# Patient Record
Sex: MALE | Race: WHITE | NOT HISPANIC OR LATINO | Employment: OTHER | URBAN - METROPOLITAN AREA
[De-identification: names, ages, dates, MRNs, and addresses within clinical notes are randomized per-mention and may not be internally consistent; named-entity substitution may affect disease eponyms.]

---

## 2017-01-06 ENCOUNTER — HOSPITAL ENCOUNTER (OUTPATIENT)
Dept: NON INVASIVE DIAGNOSTICS | Facility: CLINIC | Age: 82
Discharge: HOME | End: 2017-01-06
Payer: MEDICARE

## 2017-01-06 DIAGNOSIS — I50.20 SYSTOLIC HEART FAILURE, UNSPECIFIED HEART FAILURE CHRONICITY: ICD-10-CM

## 2017-01-06 PROCEDURE — 93306 TTE W/DOPPLER COMPLETE: CPT

## 2017-01-06 PROCEDURE — 93225 XTRNL ECG REC<48 HRS REC: CPT

## 2017-01-06 PROCEDURE — 93226 XTRNL ECG REC<48 HR SCAN A/R: CPT

## 2017-03-13 ENCOUNTER — TRANSCRIBE ORDERS (OUTPATIENT)
Dept: LAB | Facility: HOSPITAL | Age: 82
End: 2017-03-13

## 2017-03-13 ENCOUNTER — LAB (OUTPATIENT)
Dept: LAB | Facility: HOSPITAL | Age: 82
End: 2017-03-13
Attending: INTERNAL MEDICINE
Payer: MEDICARE

## 2017-03-13 DIAGNOSIS — E55.9 UNSPECIFIED VITAMIN D DEFICIENCY: ICD-10-CM

## 2017-03-13 DIAGNOSIS — E03.9 UNSPECIFIED HYPOTHYROIDISM: ICD-10-CM

## 2017-03-13 DIAGNOSIS — E78.5 HYPERLIPIDEMIA, UNSPECIFIED HYPERLIPIDEMIA TYPE: Primary | ICD-10-CM

## 2017-03-13 DIAGNOSIS — Z00.00 ROUTINE GENERAL MEDICAL EXAMINATION AT A HEALTH CARE FACILITY: ICD-10-CM

## 2017-03-13 DIAGNOSIS — I50.20 SYSTOLIC HEART FAILURE, UNSPECIFIED HEART FAILURE CHRONICITY: ICD-10-CM

## 2017-03-13 DIAGNOSIS — I50.20 SYSTOLIC HEART FAILURE, UNSPECIFIED HEART FAILURE CHRONICITY: Primary | ICD-10-CM

## 2017-03-13 LAB
ALBUMIN SERPL BCP-MCNC: 3.5 G/DL (ref 3.5–5)
ALP SERPL-CCNC: 66 U/L (ref 46–116)
ALT SERPL W P-5'-P-CCNC: 13 U/L (ref 12–78)
ANION GAP SERPL CALCULATED.3IONS-SCNC: 7 MMOL/L (ref 4–13)
AST SERPL W P-5'-P-CCNC: 14 U/L (ref 5–45)
BACTERIA UR QL AUTO: ABNORMAL /HPF
BASOPHILS # BLD AUTO: 0.05 THOUSANDS/ΜL (ref 0–0.1)
BASOPHILS NFR BLD AUTO: 1 % (ref 0–1)
BILIRUB SERPL-MCNC: 0.66 MG/DL (ref 0.2–1)
BILIRUB UR QL STRIP: NEGATIVE
BUN SERPL-MCNC: 26 MG/DL (ref 5–25)
CALCIUM SERPL-MCNC: 8.6 MG/DL (ref 8.3–10.1)
CHLORIDE SERPL-SCNC: 104 MMOL/L (ref 100–108)
CHOLEST SERPL-MCNC: 180 MG/DL (ref 50–200)
CLARITY UR: CLEAR
CO2 SERPL-SCNC: 30 MMOL/L (ref 21–32)
COLOR UR: YELLOW
CREAT SERPL-MCNC: 1.98 MG/DL (ref 0.6–1.3)
EOSINOPHIL # BLD AUTO: 0.24 THOUSAND/ΜL (ref 0–0.61)
EOSINOPHIL NFR BLD AUTO: 4 % (ref 0–6)
ERYTHROCYTE [DISTWIDTH] IN BLOOD BY AUTOMATED COUNT: 14.5 % (ref 11.6–15.1)
GFR SERPL CREATININE-BSD FRML MDRD: 32.3 ML/MIN/1.73SQ M
GLUCOSE SERPL-MCNC: 85 MG/DL (ref 65–140)
GLUCOSE UR STRIP-MCNC: NEGATIVE MG/DL
HCT VFR BLD AUTO: 38.1 % (ref 36.5–49.3)
HDLC SERPL-MCNC: 44 MG/DL (ref 40–60)
HGB BLD-MCNC: 12.3 G/DL (ref 12–17)
HGB UR QL STRIP.AUTO: NEGATIVE
HYALINE CASTS #/AREA URNS LPF: ABNORMAL /LPF
KETONES UR STRIP-MCNC: NEGATIVE MG/DL
LDLC SERPL CALC-MCNC: 121 MG/DL (ref 0–100)
LEUKOCYTE ESTERASE UR QL STRIP: ABNORMAL
LYMPHOCYTES # BLD AUTO: 2.27 THOUSANDS/ΜL (ref 0.6–4.47)
LYMPHOCYTES NFR BLD AUTO: 34 % (ref 14–44)
MCH RBC QN AUTO: 30.3 PG (ref 26.8–34.3)
MCHC RBC AUTO-ENTMCNC: 32.3 G/DL (ref 31.4–37.4)
MCV RBC AUTO: 94 FL (ref 82–98)
MONOCYTES # BLD AUTO: 0.6 THOUSAND/ΜL (ref 0.17–1.22)
MONOCYTES NFR BLD AUTO: 9 % (ref 4–12)
NEUTROPHILS # BLD AUTO: 3.49 THOUSANDS/ΜL (ref 1.85–7.62)
NEUTS SEG NFR BLD AUTO: 52 % (ref 43–75)
NITRITE UR QL STRIP: NEGATIVE
NON-SQ EPI CELLS URNS QL MICRO: ABNORMAL /HPF
NRBC BLD AUTO-RTO: 0 /100 WBCS
PH UR STRIP.AUTO: 6.5 [PH] (ref 4.5–8)
PLATELET # BLD AUTO: 201 THOUSANDS/UL (ref 149–390)
PMV BLD AUTO: 11.2 FL (ref 8.9–12.7)
POTASSIUM SERPL-SCNC: 4.4 MMOL/L (ref 3.5–5.3)
PROT SERPL-MCNC: 7.6 G/DL (ref 6.4–8.2)
PROT UR STRIP-MCNC: ABNORMAL MG/DL
RBC # BLD AUTO: 4.06 MILLION/UL (ref 3.88–5.62)
RBC #/AREA URNS AUTO: ABNORMAL /HPF
SODIUM SERPL-SCNC: 141 MMOL/L (ref 136–145)
SP GR UR STRIP.AUTO: 1.02 (ref 1–1.03)
TRIGL SERPL-MCNC: 76 MG/DL
TSH SERPL DL<=0.05 MIU/L-ACNC: 1.18 UIU/ML (ref 0.36–3.74)
UROBILINOGEN UR QL STRIP.AUTO: 0.2 E.U./DL
WBC # BLD AUTO: 6.66 THOUSAND/UL (ref 4.31–10.16)
WBC #/AREA URNS AUTO: ABNORMAL /HPF

## 2017-03-13 PROCEDURE — 81001 URINALYSIS AUTO W/SCOPE: CPT | Performed by: INTERNAL MEDICINE

## 2017-03-13 PROCEDURE — 80061 LIPID PANEL: CPT

## 2017-03-13 PROCEDURE — 84443 ASSAY THYROID STIM HORMONE: CPT

## 2017-03-13 PROCEDURE — 80053 COMPREHEN METABOLIC PANEL: CPT

## 2017-03-13 PROCEDURE — 36415 COLL VENOUS BLD VENIPUNCTURE: CPT

## 2017-03-13 PROCEDURE — 85025 COMPLETE CBC W/AUTO DIFF WBC: CPT

## 2017-06-12 ENCOUNTER — TRANSCRIBE ORDERS (OUTPATIENT)
Dept: LAB | Facility: HOSPITAL | Age: 82
End: 2017-06-12

## 2017-06-12 ENCOUNTER — APPOINTMENT (OUTPATIENT)
Dept: LAB | Facility: HOSPITAL | Age: 82
DRG: 872 | End: 2017-06-12
Attending: INTERNAL MEDICINE
Payer: MEDICARE

## 2017-06-12 DIAGNOSIS — I50.20 SYSTOLIC HEART FAILURE, UNSPECIFIED HEART FAILURE CHRONICITY: ICD-10-CM

## 2017-06-12 DIAGNOSIS — R73.01 IMPAIRED FASTING GLUCOSE: ICD-10-CM

## 2017-06-12 DIAGNOSIS — Z00.00 ROUTINE GENERAL MEDICAL EXAMINATION AT A HEALTH CARE FACILITY: ICD-10-CM

## 2017-06-12 DIAGNOSIS — K12.2 CELLULITIS AND ABSCESS OF ORAL SOFT TISSUES: Primary | ICD-10-CM

## 2017-06-12 DIAGNOSIS — E55.9 UNSPECIFIED VITAMIN D DEFICIENCY: ICD-10-CM

## 2017-06-12 DIAGNOSIS — E03.9 UNSPECIFIED HYPOTHYROIDISM: ICD-10-CM

## 2017-06-12 DIAGNOSIS — N18.9 CKD (CHRONIC KIDNEY DISEASE), UNSPECIFIED STAGE: ICD-10-CM

## 2017-06-12 DIAGNOSIS — E78.5 OTHER AND UNSPECIFIED HYPERLIPIDEMIA: Primary | ICD-10-CM

## 2017-06-12 DIAGNOSIS — D51.9 ANEMIA DUE TO VITAMIN B12 DEFICIENCY, UNSPECIFIED B12 DEFICIENCY TYPE: ICD-10-CM

## 2017-06-12 LAB
ALBUMIN SERPL BCP-MCNC: 3.5 G/DL (ref 3.5–5)
ALP SERPL-CCNC: 71 U/L (ref 46–116)
ALT SERPL W P-5'-P-CCNC: 14 U/L (ref 12–78)
ANION GAP SERPL CALCULATED.3IONS-SCNC: 4 MMOL/L (ref 4–13)
AST SERPL W P-5'-P-CCNC: 13 U/L (ref 5–45)
BACTERIA UR QL AUTO: NORMAL /HPF
BASOPHILS # BLD AUTO: 0.04 THOUSANDS/ΜL (ref 0–0.1)
BASOPHILS NFR BLD AUTO: 1 % (ref 0–1)
BILIRUB SERPL-MCNC: 0.6 MG/DL (ref 0.2–1)
BILIRUB UR QL STRIP: NEGATIVE
BUN SERPL-MCNC: 32 MG/DL (ref 5–25)
CALCIUM SERPL-MCNC: 8.7 MG/DL (ref 8.3–10.1)
CHLORIDE SERPL-SCNC: 103 MMOL/L (ref 100–108)
CLARITY UR: CLEAR
CO2 SERPL-SCNC: 31 MMOL/L (ref 21–32)
COLOR UR: YELLOW
CREAT SERPL-MCNC: 1.94 MG/DL (ref 0.6–1.3)
EOSINOPHIL # BLD AUTO: 0.22 THOUSAND/ΜL (ref 0–0.61)
EOSINOPHIL NFR BLD AUTO: 3 % (ref 0–6)
ERYTHROCYTE [DISTWIDTH] IN BLOOD BY AUTOMATED COUNT: 14.3 % (ref 11.6–15.1)
EST. AVERAGE GLUCOSE BLD GHB EST-MCNC: 114 MG/DL
GFR SERPL CREATININE-BSD FRML MDRD: 33 ML/MIN/1.73SQ M
GLUCOSE P FAST SERPL-MCNC: 93 MG/DL (ref 65–99)
GLUCOSE UR STRIP-MCNC: NEGATIVE MG/DL
HBA1C MFR BLD: 5.6 % (ref 4.2–6.3)
HCT VFR BLD AUTO: 38.2 % (ref 36.5–49.3)
HGB BLD-MCNC: 12.2 G/DL (ref 12–17)
HGB UR QL STRIP.AUTO: NEGATIVE
HYALINE CASTS #/AREA URNS LPF: NORMAL /LPF
KETONES UR STRIP-MCNC: NEGATIVE MG/DL
LEUKOCYTE ESTERASE UR QL STRIP: ABNORMAL
LYMPHOCYTES # BLD AUTO: 2.3 THOUSANDS/ΜL (ref 0.6–4.47)
LYMPHOCYTES NFR BLD AUTO: 31 % (ref 14–44)
MCH RBC QN AUTO: 30.1 PG (ref 26.8–34.3)
MCHC RBC AUTO-ENTMCNC: 31.9 G/DL (ref 31.4–37.4)
MCV RBC AUTO: 94 FL (ref 82–98)
MONOCYTES # BLD AUTO: 0.84 THOUSAND/ΜL (ref 0.17–1.22)
MONOCYTES NFR BLD AUTO: 11 % (ref 4–12)
NEUTROPHILS # BLD AUTO: 3.93 THOUSANDS/ΜL (ref 1.85–7.62)
NEUTS SEG NFR BLD AUTO: 54 % (ref 43–75)
NITRITE UR QL STRIP: NEGATIVE
NON-SQ EPI CELLS URNS QL MICRO: NORMAL /HPF
NRBC BLD AUTO-RTO: 0 /100 WBCS
PH UR STRIP.AUTO: 7 [PH] (ref 4.5–8)
PLATELET # BLD AUTO: 190 THOUSANDS/UL (ref 149–390)
PMV BLD AUTO: 11 FL (ref 8.9–12.7)
POTASSIUM SERPL-SCNC: 4.2 MMOL/L (ref 3.5–5.3)
PROT SERPL-MCNC: 7.7 G/DL (ref 6.4–8.2)
PROT UR STRIP-MCNC: ABNORMAL MG/DL
RBC # BLD AUTO: 4.05 MILLION/UL (ref 3.88–5.62)
RBC #/AREA URNS AUTO: NORMAL /HPF
SODIUM SERPL-SCNC: 138 MMOL/L (ref 136–145)
SP GR UR STRIP.AUTO: 1.02 (ref 1–1.03)
UROBILINOGEN UR QL STRIP.AUTO: 0.2 E.U./DL
WBC # BLD AUTO: 7.34 THOUSAND/UL (ref 4.31–10.16)
WBC #/AREA URNS AUTO: NORMAL /HPF

## 2017-06-12 PROCEDURE — 85025 COMPLETE CBC W/AUTO DIFF WBC: CPT

## 2017-06-12 PROCEDURE — 81001 URINALYSIS AUTO W/SCOPE: CPT | Performed by: INTERNAL MEDICINE

## 2017-06-12 PROCEDURE — 80053 COMPREHEN METABOLIC PANEL: CPT

## 2017-06-12 PROCEDURE — 83036 HEMOGLOBIN GLYCOSYLATED A1C: CPT

## 2017-06-12 PROCEDURE — 36415 COLL VENOUS BLD VENIPUNCTURE: CPT

## 2017-06-13 ENCOUNTER — APPOINTMENT (EMERGENCY)
Dept: RADIOLOGY | Facility: HOSPITAL | Age: 82
DRG: 872 | End: 2017-06-13
Payer: MEDICARE

## 2017-06-13 ENCOUNTER — APPOINTMENT (INPATIENT)
Dept: NON INVASIVE DIAGNOSTICS | Facility: HOSPITAL | Age: 82
DRG: 872 | End: 2017-06-13
Payer: MEDICARE

## 2017-06-13 ENCOUNTER — HOSPITAL ENCOUNTER (INPATIENT)
Facility: HOSPITAL | Age: 82
LOS: 2 days | Discharge: HOME HEALTH | DRG: 872 | End: 2017-06-15
Attending: EMERGENCY MEDICINE | Admitting: INTERNAL MEDICINE
Payer: MEDICARE

## 2017-06-13 DIAGNOSIS — I83.009 VENOUS STASIS ULCER (HCC): ICD-10-CM

## 2017-06-13 DIAGNOSIS — N17.9 AKI (ACUTE KIDNEY INJURY) (HCC): ICD-10-CM

## 2017-06-13 DIAGNOSIS — N18.30 STAGE 3 CHRONIC KIDNEY DISEASE (HCC): ICD-10-CM

## 2017-06-13 DIAGNOSIS — I50.32 CHRONIC DIASTOLIC CONGESTIVE HEART FAILURE (HCC): ICD-10-CM

## 2017-06-13 DIAGNOSIS — L03.115 CELLULITIS OF RIGHT LOWER EXTREMITY: Primary | ICD-10-CM

## 2017-06-13 DIAGNOSIS — A41.9 SEPSIS (HCC): ICD-10-CM

## 2017-06-13 DIAGNOSIS — R60.0 BILATERAL EDEMA OF LOWER EXTREMITY: ICD-10-CM

## 2017-06-13 DIAGNOSIS — L97.909 VENOUS STASIS ULCER (HCC): ICD-10-CM

## 2017-06-13 DIAGNOSIS — R09.02 HYPOXIA: ICD-10-CM

## 2017-06-13 PROBLEM — I10 HYPERTENSION: Status: ACTIVE | Noted: 2017-06-13

## 2017-06-13 PROBLEM — L03.90 CELLULITIS: Status: ACTIVE | Noted: 2017-06-13

## 2017-06-13 PROBLEM — E03.9 HYPOTHYROID: Status: ACTIVE | Noted: 2017-06-13

## 2017-06-13 LAB
ALBUMIN SERPL BCP-MCNC: 3.5 G/DL (ref 3.5–5)
ALP SERPL-CCNC: 68 U/L (ref 46–116)
ALT SERPL W P-5'-P-CCNC: 14 U/L (ref 12–78)
ANION GAP SERPL CALCULATED.3IONS-SCNC: 7 MMOL/L (ref 4–13)
APTT PPP: 25 SECONDS (ref 23–35)
AST SERPL W P-5'-P-CCNC: 14 U/L (ref 5–45)
BACTERIA UR QL AUTO: ABNORMAL /HPF
BASOPHILS # BLD AUTO: 0.02 THOUSANDS/ΜL (ref 0–0.1)
BASOPHILS NFR BLD AUTO: 0 % (ref 0–1)
BILIRUB SERPL-MCNC: 0.65 MG/DL (ref 0.2–1)
BILIRUB UR QL STRIP: NEGATIVE
BUN SERPL-MCNC: 32 MG/DL (ref 5–25)
CALCIUM SERPL-MCNC: 8.3 MG/DL (ref 8.3–10.1)
CHLORIDE SERPL-SCNC: 104 MMOL/L (ref 100–108)
CLARITY UR: CLEAR
CO2 SERPL-SCNC: 29 MMOL/L (ref 21–32)
COLOR UR: YELLOW
CREAT SERPL-MCNC: 1.84 MG/DL (ref 0.6–1.3)
EOSINOPHIL # BLD AUTO: 0.07 THOUSAND/ΜL (ref 0–0.61)
EOSINOPHIL NFR BLD AUTO: 1 % (ref 0–6)
ERYTHROCYTE [DISTWIDTH] IN BLOOD BY AUTOMATED COUNT: 14.3 % (ref 11.6–15.1)
GFR SERPL CREATININE-BSD FRML MDRD: 35.1 ML/MIN/1.73SQ M
GLUCOSE SERPL-MCNC: 114 MG/DL (ref 65–140)
GLUCOSE UR STRIP-MCNC: NEGATIVE MG/DL
HCT VFR BLD AUTO: 36.6 % (ref 36.5–49.3)
HGB BLD-MCNC: 11.9 G/DL (ref 12–17)
HGB UR QL STRIP.AUTO: NEGATIVE
HYALINE CASTS #/AREA URNS LPF: ABNORMAL /LPF
INR PPP: 1.1 (ref 0.86–1.16)
KETONES UR STRIP-MCNC: NEGATIVE MG/DL
LACTATE SERPL-SCNC: 1.3 MMOL/L (ref 0.5–2)
LEUKOCYTE ESTERASE UR QL STRIP: ABNORMAL
LYMPHOCYTES # BLD AUTO: 0.79 THOUSANDS/ΜL (ref 0.6–4.47)
LYMPHOCYTES NFR BLD AUTO: 6 % (ref 14–44)
MCH RBC QN AUTO: 30.6 PG (ref 26.8–34.3)
MCHC RBC AUTO-ENTMCNC: 32.5 G/DL (ref 31.4–37.4)
MCV RBC AUTO: 94 FL (ref 82–98)
MONOCYTES # BLD AUTO: 0.71 THOUSAND/ΜL (ref 0.17–1.22)
MONOCYTES NFR BLD AUTO: 5 % (ref 4–12)
NEUTROPHILS # BLD AUTO: 12.47 THOUSANDS/ΜL (ref 1.85–7.62)
NEUTS SEG NFR BLD AUTO: 88 % (ref 43–75)
NITRITE UR QL STRIP: NEGATIVE
NON-SQ EPI CELLS URNS QL MICRO: ABNORMAL /HPF
NRBC BLD AUTO-RTO: 0 /100 WBCS
PH UR STRIP.AUTO: 6 [PH] (ref 4.5–8)
PLATELET # BLD AUTO: 162 THOUSANDS/UL (ref 149–390)
PMV BLD AUTO: 10.9 FL (ref 8.9–12.7)
POTASSIUM SERPL-SCNC: 3.9 MMOL/L (ref 3.5–5.3)
PROT SERPL-MCNC: 7.5 G/DL (ref 6.4–8.2)
PROT UR STRIP-MCNC: ABNORMAL MG/DL
PROTHROMBIN TIME: 14.2 SECONDS (ref 12.1–14.4)
RBC # BLD AUTO: 3.89 MILLION/UL (ref 3.88–5.62)
RBC #/AREA URNS AUTO: ABNORMAL /HPF
SODIUM SERPL-SCNC: 140 MMOL/L (ref 136–145)
SP GR UR STRIP.AUTO: 1.01 (ref 1–1.03)
SPECIMEN SOURCE: NORMAL
TROPONIN I BLD-MCNC: 0 NG/ML (ref 0–0.08)
TSH SERPL DL<=0.05 MIU/L-ACNC: 0.27 UIU/ML (ref 0.36–3.74)
UROBILINOGEN UR QL STRIP.AUTO: 0.2 E.U./DL
WBC # BLD AUTO: 14.09 THOUSAND/UL (ref 4.31–10.16)
WBC #/AREA URNS AUTO: ABNORMAL /HPF

## 2017-06-13 PROCEDURE — 96361 HYDRATE IV INFUSION ADD-ON: CPT

## 2017-06-13 PROCEDURE — 85730 THROMBOPLASTIN TIME PARTIAL: CPT | Performed by: EMERGENCY MEDICINE

## 2017-06-13 PROCEDURE — 87040 BLOOD CULTURE FOR BACTERIA: CPT | Performed by: EMERGENCY MEDICINE

## 2017-06-13 PROCEDURE — 85025 COMPLETE CBC W/AUTO DIFF WBC: CPT | Performed by: EMERGENCY MEDICINE

## 2017-06-13 PROCEDURE — 93005 ELECTROCARDIOGRAM TRACING: CPT | Performed by: EMERGENCY MEDICINE

## 2017-06-13 PROCEDURE — 84484 ASSAY OF TROPONIN QUANT: CPT

## 2017-06-13 PROCEDURE — 81001 URINALYSIS AUTO W/SCOPE: CPT | Performed by: EMERGENCY MEDICINE

## 2017-06-13 PROCEDURE — 90471 IMMUNIZATION ADMIN: CPT

## 2017-06-13 PROCEDURE — 71250 CT THORAX DX C-: CPT

## 2017-06-13 PROCEDURE — 93970 EXTREMITY STUDY: CPT

## 2017-06-13 PROCEDURE — 36415 COLL VENOUS BLD VENIPUNCTURE: CPT

## 2017-06-13 PROCEDURE — 83605 ASSAY OF LACTIC ACID: CPT | Performed by: EMERGENCY MEDICINE

## 2017-06-13 PROCEDURE — 80053 COMPREHEN METABOLIC PANEL: CPT | Performed by: EMERGENCY MEDICINE

## 2017-06-13 PROCEDURE — 99285 EMERGENCY DEPT VISIT HI MDM: CPT

## 2017-06-13 PROCEDURE — 84443 ASSAY THYROID STIM HORMONE: CPT | Performed by: INTERNAL MEDICINE

## 2017-06-13 PROCEDURE — 96365 THER/PROPH/DIAG IV INF INIT: CPT

## 2017-06-13 PROCEDURE — 85610 PROTHROMBIN TIME: CPT | Performed by: EMERGENCY MEDICINE

## 2017-06-13 PROCEDURE — 93306 TTE W/DOPPLER COMPLETE: CPT

## 2017-06-13 PROCEDURE — 71020 HB CHEST X-RAY 2VW FRONTAL&LATL: CPT

## 2017-06-13 PROCEDURE — 90715 TDAP VACCINE 7 YRS/> IM: CPT | Performed by: EMERGENCY MEDICINE

## 2017-06-13 RX ORDER — ACETAMINOPHEN 325 MG/1
650 TABLET ORAL EVERY 6 HOURS PRN
Status: DISCONTINUED | OUTPATIENT
Start: 2017-06-13 | End: 2017-06-13 | Stop reason: SDUPTHER

## 2017-06-13 RX ORDER — FUROSEMIDE 40 MG/1
40 TABLET ORAL
Status: DISCONTINUED | OUTPATIENT
Start: 2017-06-13 | End: 2017-06-14

## 2017-06-13 RX ORDER — ACETAMINOPHEN 325 MG/1
650 TABLET ORAL EVERY 6 HOURS PRN
Status: DISCONTINUED | OUTPATIENT
Start: 2017-06-13 | End: 2017-06-15 | Stop reason: HOSPADM

## 2017-06-13 RX ORDER — LEVOTHYROXINE SODIUM 0.12 MG/1
125 TABLET ORAL
Status: DISCONTINUED | OUTPATIENT
Start: 2017-06-14 | End: 2017-06-14

## 2017-06-13 RX ORDER — METOPROLOL SUCCINATE 25 MG/1
25 TABLET, EXTENDED RELEASE ORAL DAILY
COMMUNITY
End: 2020-10-24

## 2017-06-13 RX ORDER — VALSARTAN AND HYDROCHLOROTHIAZIDE 320; 12.5 MG/1; MG/1
1 TABLET, FILM COATED ORAL DAILY
COMMUNITY
End: 2018-03-05

## 2017-06-13 RX ORDER — FUROSEMIDE 40 MG/1
40 TABLET ORAL 2 TIMES DAILY
Status: ON HOLD | COMMUNITY
End: 2017-06-15

## 2017-06-13 RX ORDER — METOPROLOL SUCCINATE 25 MG/1
25 TABLET, EXTENDED RELEASE ORAL DAILY
Status: DISCONTINUED | OUTPATIENT
Start: 2017-06-13 | End: 2017-06-15 | Stop reason: HOSPADM

## 2017-06-13 RX ORDER — LEVOTHYROXINE SODIUM 0.12 MG/1
125 TABLET ORAL DAILY
COMMUNITY
End: 2017-06-15 | Stop reason: HOSPADM

## 2017-06-13 RX ORDER — SODIUM CHLORIDE 9 MG/ML
75 INJECTION, SOLUTION INTRAVENOUS CONTINUOUS
Status: DISCONTINUED | OUTPATIENT
Start: 2017-06-13 | End: 2017-06-15 | Stop reason: HOSPADM

## 2017-06-13 RX ORDER — ACETAMINOPHEN 325 MG/1
650 TABLET ORAL ONCE
Status: COMPLETED | OUTPATIENT
Start: 2017-06-13 | End: 2017-06-13

## 2017-06-13 RX ORDER — SODIUM CHLORIDE 9 MG/ML
75 INJECTION, SOLUTION INTRAVENOUS CONTINUOUS
Status: DISCONTINUED | OUTPATIENT
Start: 2017-06-13 | End: 2017-06-13 | Stop reason: SDUPTHER

## 2017-06-13 RX ADMIN — SODIUM CHLORIDE 1000 ML: 0.9 INJECTION, SOLUTION INTRAVENOUS at 09:31

## 2017-06-13 RX ADMIN — CEFAZOLIN SODIUM 2000 MG: 2 SOLUTION INTRAVENOUS at 10:25

## 2017-06-13 RX ADMIN — METOPROLOL SUCCINATE 25 MG: 25 TABLET, EXTENDED RELEASE ORAL at 15:58

## 2017-06-13 RX ADMIN — ENOXAPARIN SODIUM 40 MG: 40 INJECTION SUBCUTANEOUS at 15:58

## 2017-06-13 RX ADMIN — SODIUM CHLORIDE 75 ML/HR: 0.9 INJECTION, SOLUTION INTRAVENOUS at 16:05

## 2017-06-13 RX ADMIN — FUROSEMIDE 40 MG: 40 TABLET ORAL at 16:05

## 2017-06-13 RX ADMIN — ACETAMINOPHEN 650 MG: 325 TABLET, FILM COATED ORAL at 15:59

## 2017-06-13 RX ADMIN — VALSARTAN: 160 TABLET ORAL at 15:58

## 2017-06-13 RX ADMIN — ACETAMINOPHEN 650 MG: 325 TABLET, FILM COATED ORAL at 08:38

## 2017-06-13 RX ADMIN — TETANUS TOXOID, REDUCED DIPHTHERIA TOXOID AND ACELLULAR PERTUSSIS VACCINE, ADSORBED 0.5 ML: 5; 2.5; 8; 8; 2.5 SUSPENSION INTRAMUSCULAR at 10:27

## 2017-06-14 ENCOUNTER — APPOINTMENT (INPATIENT)
Dept: PHYSICAL THERAPY | Facility: HOSPITAL | Age: 82
DRG: 872 | End: 2017-06-14
Payer: MEDICARE

## 2017-06-14 PROBLEM — I50.32 CHRONIC DIASTOLIC CONGESTIVE HEART FAILURE (HCC): Status: ACTIVE | Noted: 2017-06-14

## 2017-06-14 PROBLEM — N18.9 CKD (CHRONIC KIDNEY DISEASE): Status: ACTIVE | Noted: 2017-06-14

## 2017-06-14 PROBLEM — I83.009 VENOUS STASIS ULCER (HCC): Status: ACTIVE | Noted: 2017-06-14

## 2017-06-14 PROBLEM — N18.30 STAGE 3 CHRONIC KIDNEY DISEASE (HCC): Status: ACTIVE | Noted: 2017-06-14

## 2017-06-14 PROBLEM — L03.115 CELLULITIS OF RIGHT LOWER EXTREMITY: Status: ACTIVE | Noted: 2017-06-13

## 2017-06-14 PROBLEM — N17.9 AKI (ACUTE KIDNEY INJURY) (HCC): Status: ACTIVE | Noted: 2017-06-14

## 2017-06-14 PROBLEM — R09.02 HYPOXEMIA: Status: RESOLVED | Noted: 2017-06-13 | Resolved: 2017-06-14

## 2017-06-14 PROBLEM — D72.829 LEUKOCYTOSIS: Status: ACTIVE | Noted: 2017-06-14

## 2017-06-14 PROBLEM — L97.909 VENOUS STASIS ULCER (HCC): Status: ACTIVE | Noted: 2017-06-14

## 2017-06-14 LAB
ANION GAP SERPL CALCULATED.3IONS-SCNC: 7 MMOL/L (ref 4–13)
ATRIAL RATE: 170 BPM
BASOPHILS # BLD AUTO: 0.03 THOUSANDS/ΜL (ref 0–0.1)
BASOPHILS NFR BLD AUTO: 0 % (ref 0–1)
BUN SERPL-MCNC: 32 MG/DL (ref 5–25)
CALCIUM SERPL-MCNC: 8.4 MG/DL (ref 8.3–10.1)
CHLORIDE SERPL-SCNC: 105 MMOL/L (ref 100–108)
CO2 SERPL-SCNC: 27 MMOL/L (ref 21–32)
CREAT SERPL-MCNC: 2.13 MG/DL (ref 0.6–1.3)
EOSINOPHIL # BLD AUTO: 0 THOUSAND/ΜL (ref 0–0.61)
EOSINOPHIL NFR BLD AUTO: 0 % (ref 0–6)
ERYTHROCYTE [DISTWIDTH] IN BLOOD BY AUTOMATED COUNT: 14.7 % (ref 11.6–15.1)
GFR SERPL CREATININE-BSD FRML MDRD: 29.6 ML/MIN/1.73SQ M
GLUCOSE SERPL-MCNC: 100 MG/DL (ref 65–140)
HCT VFR BLD AUTO: 34.5 % (ref 36.5–49.3)
HGB BLD-MCNC: 11 G/DL (ref 12–17)
LYMPHOCYTES # BLD AUTO: 1.05 THOUSANDS/ΜL (ref 0.6–4.47)
LYMPHOCYTES NFR BLD AUTO: 6 % (ref 14–44)
MCH RBC QN AUTO: 30.2 PG (ref 26.8–34.3)
MCHC RBC AUTO-ENTMCNC: 31.9 G/DL (ref 31.4–37.4)
MCV RBC AUTO: 95 FL (ref 82–98)
MONOCYTES # BLD AUTO: 1.1 THOUSAND/ΜL (ref 0.17–1.22)
MONOCYTES NFR BLD AUTO: 7 % (ref 4–12)
NEUTROPHILS # BLD AUTO: 14.62 THOUSANDS/ΜL (ref 1.85–7.62)
NEUTS SEG NFR BLD AUTO: 87 % (ref 43–75)
NRBC BLD AUTO-RTO: 0 /100 WBCS
PLATELET # BLD AUTO: 149 THOUSANDS/UL (ref 149–390)
PMV BLD AUTO: 11.2 FL (ref 8.9–12.7)
POTASSIUM SERPL-SCNC: 3.8 MMOL/L (ref 3.5–5.3)
QRS AXIS: -56 DEGREES
QRSD INTERVAL: 132 MS
QT INTERVAL: 338 MS
QTC INTERVAL: 424 MS
RBC # BLD AUTO: 3.64 MILLION/UL (ref 3.88–5.62)
SODIUM SERPL-SCNC: 139 MMOL/L (ref 136–145)
T WAVE AXIS: 91 DEGREES
VENTRICULAR RATE: 95 BPM
WBC # BLD AUTO: 16.87 THOUSAND/UL (ref 4.31–10.16)

## 2017-06-14 PROCEDURE — G8978 MOBILITY CURRENT STATUS: HCPCS

## 2017-06-14 PROCEDURE — 97166 OT EVAL MOD COMPLEX 45 MIN: CPT

## 2017-06-14 PROCEDURE — G8979 MOBILITY GOAL STATUS: HCPCS

## 2017-06-14 PROCEDURE — 85025 COMPLETE CBC W/AUTO DIFF WBC: CPT | Performed by: INTERNAL MEDICINE

## 2017-06-14 PROCEDURE — G8987 SELF CARE CURRENT STATUS: HCPCS

## 2017-06-14 PROCEDURE — 80048 BASIC METABOLIC PNL TOTAL CA: CPT | Performed by: INTERNAL MEDICINE

## 2017-06-14 PROCEDURE — G8988 SELF CARE GOAL STATUS: HCPCS

## 2017-06-14 PROCEDURE — 97163 PT EVAL HIGH COMPLEX 45 MIN: CPT

## 2017-06-14 RX ORDER — LEVOTHYROXINE SODIUM 88 UG/1
88 TABLET ORAL
Status: DISCONTINUED | OUTPATIENT
Start: 2017-06-15 | End: 2017-06-15 | Stop reason: HOSPADM

## 2017-06-14 RX ADMIN — LEVOTHYROXINE SODIUM 125 MCG: 125 TABLET ORAL at 05:29

## 2017-06-14 RX ADMIN — CEFAZOLIN SODIUM 500 MG: 1 INJECTION, POWDER, FOR SOLUTION INTRAMUSCULAR; INTRAVENOUS at 21:41

## 2017-06-14 RX ADMIN — VALSARTAN: 160 TABLET ORAL at 09:33

## 2017-06-14 RX ADMIN — CEFAZOLIN SODIUM 500 MG: 1 INJECTION, POWDER, FOR SOLUTION INTRAMUSCULAR; INTRAVENOUS at 09:33

## 2017-06-14 RX ADMIN — METOPROLOL SUCCINATE 25 MG: 25 TABLET, EXTENDED RELEASE ORAL at 09:32

## 2017-06-14 RX ADMIN — FUROSEMIDE 40 MG: 40 TABLET ORAL at 09:33

## 2017-06-14 RX ADMIN — SODIUM CHLORIDE 75 ML/HR: 0.9 INJECTION, SOLUTION INTRAVENOUS at 04:09

## 2017-06-14 RX ADMIN — ENOXAPARIN SODIUM 40 MG: 40 INJECTION SUBCUTANEOUS at 09:32

## 2017-06-15 VITALS
OXYGEN SATURATION: 92 % | DIASTOLIC BLOOD PRESSURE: 66 MMHG | WEIGHT: 209 LBS | TEMPERATURE: 98.3 F | SYSTOLIC BLOOD PRESSURE: 150 MMHG | HEIGHT: 68 IN | HEART RATE: 61 BPM | RESPIRATION RATE: 18 BRPM | BODY MASS INDEX: 31.67 KG/M2

## 2017-06-15 PROBLEM — N17.9 AKI (ACUTE KIDNEY INJURY) (HCC): Status: RESOLVED | Noted: 2017-06-14 | Resolved: 2017-06-15

## 2017-06-15 LAB
ANION GAP SERPL CALCULATED.3IONS-SCNC: 8 MMOL/L (ref 4–13)
BASOPHILS # BLD AUTO: 0.02 THOUSANDS/ΜL (ref 0–0.1)
BASOPHILS NFR BLD AUTO: 0 % (ref 0–1)
BUN SERPL-MCNC: 33 MG/DL (ref 5–25)
CALCIUM SERPL-MCNC: 8.2 MG/DL (ref 8.3–10.1)
CHLORIDE SERPL-SCNC: 104 MMOL/L (ref 100–108)
CO2 SERPL-SCNC: 26 MMOL/L (ref 21–32)
CREAT SERPL-MCNC: 1.98 MG/DL (ref 0.6–1.3)
EOSINOPHIL # BLD AUTO: 0.11 THOUSAND/ΜL (ref 0–0.61)
EOSINOPHIL NFR BLD AUTO: 1 % (ref 0–6)
ERYTHROCYTE [DISTWIDTH] IN BLOOD BY AUTOMATED COUNT: 14.7 % (ref 11.6–15.1)
GFR SERPL CREATININE-BSD FRML MDRD: 32.2 ML/MIN/1.73SQ M
GLUCOSE SERPL-MCNC: 93 MG/DL (ref 65–140)
HCT VFR BLD AUTO: 33.7 % (ref 36.5–49.3)
HGB BLD-MCNC: 10.9 G/DL (ref 12–17)
LYMPHOCYTES # BLD AUTO: 1.41 THOUSANDS/ΜL (ref 0.6–4.47)
LYMPHOCYTES NFR BLD AUTO: 13 % (ref 14–44)
MCH RBC QN AUTO: 30.4 PG (ref 26.8–34.3)
MCHC RBC AUTO-ENTMCNC: 32.3 G/DL (ref 31.4–37.4)
MCV RBC AUTO: 94 FL (ref 82–98)
MONOCYTES # BLD AUTO: 1.12 THOUSAND/ΜL (ref 0.17–1.22)
MONOCYTES NFR BLD AUTO: 11 % (ref 4–12)
NEUTROPHILS # BLD AUTO: 7.91 THOUSANDS/ΜL (ref 1.85–7.62)
NEUTS SEG NFR BLD AUTO: 75 % (ref 43–75)
NRBC BLD AUTO-RTO: 0 /100 WBCS
PLATELET # BLD AUTO: 152 THOUSANDS/UL (ref 149–390)
PMV BLD AUTO: 11.5 FL (ref 8.9–12.7)
POTASSIUM SERPL-SCNC: 3.6 MMOL/L (ref 3.5–5.3)
RBC # BLD AUTO: 3.58 MILLION/UL (ref 3.88–5.62)
SODIUM SERPL-SCNC: 138 MMOL/L (ref 136–145)
WBC # BLD AUTO: 10.6 THOUSAND/UL (ref 4.31–10.16)

## 2017-06-15 PROCEDURE — 80048 BASIC METABOLIC PNL TOTAL CA: CPT | Performed by: INTERNAL MEDICINE

## 2017-06-15 PROCEDURE — 85025 COMPLETE CBC W/AUTO DIFF WBC: CPT | Performed by: INTERNAL MEDICINE

## 2017-06-15 RX ORDER — CEPHALEXIN 250 MG/1
250 CAPSULE ORAL 3 TIMES DAILY
Qty: 30 CAPSULE | Refills: 0 | Status: SHIPPED | OUTPATIENT
Start: 2017-06-15 | End: 2017-06-15

## 2017-06-15 RX ORDER — POTASSIUM CHLORIDE 20 MEQ/1
40 TABLET, EXTENDED RELEASE ORAL ONCE
Status: COMPLETED | OUTPATIENT
Start: 2017-06-15 | End: 2017-06-15

## 2017-06-15 RX ORDER — HEPARIN SODIUM 5000 [USP'U]/ML
5000 INJECTION, SOLUTION INTRAVENOUS; SUBCUTANEOUS EVERY 8 HOURS SCHEDULED
Status: DISCONTINUED | OUTPATIENT
Start: 2017-06-15 | End: 2017-06-15 | Stop reason: HOSPADM

## 2017-06-15 RX ORDER — LEVOTHYROXINE SODIUM 88 UG/1
88 TABLET ORAL
Qty: 30 TABLET | Refills: 0 | Status: SHIPPED | OUTPATIENT
Start: 2017-06-15 | End: 2020-12-22

## 2017-06-15 RX ORDER — FUROSEMIDE 40 MG/1
40 TABLET ORAL DAILY
Qty: 30 TABLET | Refills: 0 | Status: SHIPPED | OUTPATIENT
Start: 2017-06-15 | End: 2020-09-28

## 2017-06-15 RX ORDER — CEPHALEXIN 250 MG/1
250 CAPSULE ORAL 3 TIMES DAILY
Qty: 15 CAPSULE | Refills: 0 | Status: SHIPPED | OUTPATIENT
Start: 2017-06-15 | End: 2017-06-20

## 2017-06-15 RX ADMIN — CEFAZOLIN SODIUM 500 MG: 1 INJECTION, POWDER, FOR SOLUTION INTRAMUSCULAR; INTRAVENOUS at 09:02

## 2017-06-15 RX ADMIN — POTASSIUM CHLORIDE 40 MEQ: 1500 TABLET, EXTENDED RELEASE ORAL at 09:02

## 2017-06-15 RX ADMIN — VALSARTAN: 160 TABLET ORAL at 09:01

## 2017-06-15 RX ADMIN — METOPROLOL SUCCINATE 25 MG: 25 TABLET, EXTENDED RELEASE ORAL at 09:01

## 2017-06-15 RX ADMIN — LEVOTHYROXINE SODIUM 88 MCG: 88 TABLET ORAL at 06:25

## 2017-06-18 LAB
BACTERIA BLD CULT: NORMAL
BACTERIA BLD CULT: NORMAL

## 2017-06-20 ENCOUNTER — APPOINTMENT (OUTPATIENT)
Dept: LAB | Facility: HOSPITAL | Age: 82
End: 2017-06-20
Attending: INTERNAL MEDICINE
Payer: MEDICARE

## 2017-06-20 DIAGNOSIS — K12.2 CELLULITIS AND ABSCESS OF ORAL SOFT TISSUES: ICD-10-CM

## 2017-06-20 DIAGNOSIS — N18.9 CKD (CHRONIC KIDNEY DISEASE), UNSPECIFIED STAGE: ICD-10-CM

## 2017-06-20 LAB
ANION GAP SERPL CALCULATED.3IONS-SCNC: 6 MMOL/L (ref 4–13)
BUN SERPL-MCNC: 27 MG/DL (ref 5–25)
CALCIUM SERPL-MCNC: 8.7 MG/DL (ref 8.3–10.1)
CHLORIDE SERPL-SCNC: 101 MMOL/L (ref 100–108)
CO2 SERPL-SCNC: 30 MMOL/L (ref 21–32)
CREAT SERPL-MCNC: 1.75 MG/DL (ref 0.6–1.3)
GFR SERPL CREATININE-BSD FRML MDRD: 37.1 ML/MIN/1.73SQ M
GLUCOSE P FAST SERPL-MCNC: 108 MG/DL (ref 65–99)
POTASSIUM SERPL-SCNC: 3.8 MMOL/L (ref 3.5–5.3)
SODIUM SERPL-SCNC: 137 MMOL/L (ref 136–145)

## 2017-06-20 PROCEDURE — 80048 BASIC METABOLIC PNL TOTAL CA: CPT

## 2017-06-20 PROCEDURE — 36415 COLL VENOUS BLD VENIPUNCTURE: CPT

## 2017-08-21 ENCOUNTER — TRANSCRIBE ORDERS (OUTPATIENT)
Dept: LAB | Facility: HOSPITAL | Age: 82
End: 2017-08-21

## 2017-08-21 ENCOUNTER — APPOINTMENT (OUTPATIENT)
Dept: LAB | Facility: HOSPITAL | Age: 82
End: 2017-08-21
Attending: INTERNAL MEDICINE
Payer: MEDICARE

## 2017-08-21 DIAGNOSIS — E03.9 UNSPECIFIED HYPOTHYROIDISM: ICD-10-CM

## 2017-08-21 DIAGNOSIS — E03.9 UNSPECIFIED HYPOTHYROIDISM: Primary | ICD-10-CM

## 2017-08-21 LAB
ANION GAP SERPL CALCULATED.3IONS-SCNC: 5 MMOL/L (ref 4–13)
BUN SERPL-MCNC: 28 MG/DL (ref 5–25)
CALCIUM SERPL-MCNC: 9 MG/DL (ref 8.3–10.1)
CHLORIDE SERPL-SCNC: 101 MMOL/L (ref 100–108)
CO2 SERPL-SCNC: 33 MMOL/L (ref 21–32)
CREAT SERPL-MCNC: 1.94 MG/DL (ref 0.6–1.3)
GFR SERPL CREATININE-BSD FRML MDRD: 30 ML/MIN/1.73SQ M
GLUCOSE SERPL-MCNC: 96 MG/DL (ref 65–140)
POTASSIUM SERPL-SCNC: 3.8 MMOL/L (ref 3.5–5.3)
SODIUM SERPL-SCNC: 139 MMOL/L (ref 136–145)
T4 FREE SERPL-MCNC: 0.88 NG/DL (ref 0.76–1.46)
TSH SERPL DL<=0.05 MIU/L-ACNC: 5.63 UIU/ML (ref 0.36–3.74)

## 2017-08-21 PROCEDURE — 36415 COLL VENOUS BLD VENIPUNCTURE: CPT

## 2017-08-21 PROCEDURE — 80048 BASIC METABOLIC PNL TOTAL CA: CPT

## 2017-08-21 PROCEDURE — 84439 ASSAY OF FREE THYROXINE: CPT

## 2017-08-21 PROCEDURE — 84443 ASSAY THYROID STIM HORMONE: CPT

## 2017-10-23 ENCOUNTER — TRANSCRIBE ORDERS (OUTPATIENT)
Dept: LAB | Facility: HOSPITAL | Age: 82
End: 2017-10-23

## 2017-10-23 ENCOUNTER — APPOINTMENT (OUTPATIENT)
Dept: LAB | Facility: HOSPITAL | Age: 82
End: 2017-10-23
Attending: INTERNAL MEDICINE
Payer: MEDICARE

## 2017-10-23 DIAGNOSIS — I51.9 MYXEDEMA HEART DISEASE: ICD-10-CM

## 2017-10-23 DIAGNOSIS — I51.9 MYXEDEMA HEART DISEASE: Primary | ICD-10-CM

## 2017-10-23 DIAGNOSIS — E03.9 MYXEDEMA HEART DISEASE: Primary | ICD-10-CM

## 2017-10-23 DIAGNOSIS — E03.9 MYXEDEMA HEART DISEASE: ICD-10-CM

## 2017-10-23 LAB
T4 FREE SERPL-MCNC: 0.81 NG/DL (ref 0.76–1.46)
TSH SERPL DL<=0.05 MIU/L-ACNC: 3.66 UIU/ML (ref 0.36–3.74)

## 2017-10-23 PROCEDURE — 84439 ASSAY OF FREE THYROXINE: CPT

## 2017-10-23 PROCEDURE — 36415 COLL VENOUS BLD VENIPUNCTURE: CPT

## 2017-10-23 PROCEDURE — 84443 ASSAY THYROID STIM HORMONE: CPT

## 2018-01-29 ENCOUNTER — APPOINTMENT (OUTPATIENT)
Dept: LAB | Facility: HOSPITAL | Age: 83
End: 2018-01-29
Attending: INTERNAL MEDICINE
Payer: MEDICARE

## 2018-01-29 ENCOUNTER — TRANSCRIBE ORDERS (OUTPATIENT)
Dept: LAB | Facility: HOSPITAL | Age: 83
End: 2018-01-29

## 2018-01-29 DIAGNOSIS — R73.01 IMPAIRED FASTING GLUCOSE: ICD-10-CM

## 2018-01-29 DIAGNOSIS — I50.20 SYSTOLIC HEART FAILURE, UNSPECIFIED HEART FAILURE CHRONICITY: ICD-10-CM

## 2018-01-29 DIAGNOSIS — E03.9 HYPOTHYROIDISM: ICD-10-CM

## 2018-01-29 DIAGNOSIS — I50.20 SYSTOLIC HEART FAILURE, UNSPECIFIED HEART FAILURE CHRONICITY: Primary | ICD-10-CM

## 2018-01-29 LAB
25(OH)D3 SERPL-MCNC: 33.8 NG/ML (ref 30–100)
ALBUMIN SERPL BCP-MCNC: 3.6 G/DL (ref 3.5–5)
ALP SERPL-CCNC: 64 U/L (ref 46–116)
ALT SERPL W P-5'-P-CCNC: 12 U/L (ref 12–78)
ANION GAP SERPL CALCULATED.3IONS-SCNC: 5 MMOL/L (ref 4–13)
AST SERPL W P-5'-P-CCNC: 12 U/L (ref 5–45)
BACTERIA UR QL AUTO: NORMAL /HPF
BASOPHILS # BLD AUTO: 0.05 THOUSANDS/ΜL (ref 0–0.1)
BASOPHILS NFR BLD AUTO: 1 % (ref 0–1)
BILIRUB SERPL-MCNC: 0.56 MG/DL (ref 0.2–1)
BILIRUB UR QL STRIP: NEGATIVE
BUN SERPL-MCNC: 21 MG/DL (ref 5–25)
CALCIUM SERPL-MCNC: 8.6 MG/DL (ref 8.3–10.1)
CHLORIDE SERPL-SCNC: 102 MMOL/L (ref 100–108)
CLARITY UR: CLEAR
CO2 SERPL-SCNC: 31 MMOL/L (ref 21–32)
COLOR UR: YELLOW
CREAT SERPL-MCNC: 1.82 MG/DL (ref 0.6–1.3)
CREAT UR-MCNC: 205 MG/DL
EOSINOPHIL # BLD AUTO: 0.23 THOUSAND/ΜL (ref 0–0.61)
EOSINOPHIL NFR BLD AUTO: 3 % (ref 0–6)
ERYTHROCYTE [DISTWIDTH] IN BLOOD BY AUTOMATED COUNT: 14.3 % (ref 11.6–15.1)
EST. AVERAGE GLUCOSE BLD GHB EST-MCNC: 114 MG/DL
GFR SERPL CREATININE-BSD FRML MDRD: 33 ML/MIN/1.73SQ M
GLUCOSE SERPL-MCNC: 92 MG/DL (ref 65–140)
GLUCOSE UR STRIP-MCNC: NEGATIVE MG/DL
HBA1C MFR BLD: 5.6 % (ref 4.2–6.3)
HCT VFR BLD AUTO: 38.4 % (ref 36.5–49.3)
HGB BLD-MCNC: 12.6 G/DL (ref 12–17)
HGB UR QL STRIP.AUTO: NEGATIVE
HYALINE CASTS #/AREA URNS LPF: NORMAL /LPF
KETONES UR STRIP-MCNC: NEGATIVE MG/DL
LEUKOCYTE ESTERASE UR QL STRIP: ABNORMAL
LYMPHOCYTES # BLD AUTO: 2.26 THOUSANDS/ΜL (ref 0.6–4.47)
LYMPHOCYTES NFR BLD AUTO: 33 % (ref 14–44)
MCH RBC QN AUTO: 31.2 PG (ref 26.8–34.3)
MCHC RBC AUTO-ENTMCNC: 32.8 G/DL (ref 31.4–37.4)
MCV RBC AUTO: 95 FL (ref 82–98)
MICROALBUMIN UR-MCNC: 82.7 MG/L (ref 0–20)
MICROALBUMIN/CREAT 24H UR: 40 MG/G CREATININE (ref 0–30)
MONOCYTES # BLD AUTO: 0.61 THOUSAND/ΜL (ref 0.17–1.22)
MONOCYTES NFR BLD AUTO: 9 % (ref 4–12)
NEUTROPHILS # BLD AUTO: 3.7 THOUSANDS/ΜL (ref 1.85–7.62)
NEUTS SEG NFR BLD AUTO: 54 % (ref 43–75)
NITRITE UR QL STRIP: NEGATIVE
NON-SQ EPI CELLS URNS QL MICRO: NORMAL /HPF
NRBC BLD AUTO-RTO: 0 /100 WBCS
NT-PROBNP SERPL-MCNC: 133 PG/ML
PH UR STRIP.AUTO: 6.5 [PH] (ref 4.5–8)
PLATELET # BLD AUTO: 207 THOUSANDS/UL (ref 149–390)
PMV BLD AUTO: 10.8 FL (ref 8.9–12.7)
POTASSIUM SERPL-SCNC: 3.9 MMOL/L (ref 3.5–5.3)
PROT SERPL-MCNC: 8.1 G/DL (ref 6.4–8.2)
PROT UR STRIP-MCNC: ABNORMAL MG/DL
RBC # BLD AUTO: 4.04 MILLION/UL (ref 3.88–5.62)
RBC #/AREA URNS AUTO: NORMAL /HPF
SODIUM SERPL-SCNC: 138 MMOL/L (ref 136–145)
SP GR UR STRIP.AUTO: 1.02 (ref 1–1.03)
T4 FREE SERPL-MCNC: 0.91 NG/DL (ref 0.76–1.46)
TSH SERPL DL<=0.05 MIU/L-ACNC: 5.72 UIU/ML (ref 0.36–3.74)
UROBILINOGEN UR QL STRIP.AUTO: 0.2 E.U./DL
WBC # BLD AUTO: 6.87 THOUSAND/UL (ref 4.31–10.16)
WBC #/AREA URNS AUTO: NORMAL /HPF

## 2018-01-29 PROCEDURE — 81001 URINALYSIS AUTO W/SCOPE: CPT

## 2018-01-29 PROCEDURE — 84439 ASSAY OF FREE THYROXINE: CPT

## 2018-01-29 PROCEDURE — 80053 COMPREHEN METABOLIC PANEL: CPT

## 2018-01-29 PROCEDURE — 83880 ASSAY OF NATRIURETIC PEPTIDE: CPT

## 2018-01-29 PROCEDURE — 83036 HEMOGLOBIN GLYCOSYLATED A1C: CPT

## 2018-01-29 PROCEDURE — 82570 ASSAY OF URINE CREATININE: CPT

## 2018-01-29 PROCEDURE — 84443 ASSAY THYROID STIM HORMONE: CPT

## 2018-01-29 PROCEDURE — 85025 COMPLETE CBC W/AUTO DIFF WBC: CPT

## 2018-01-29 PROCEDURE — 82306 VITAMIN D 25 HYDROXY: CPT

## 2018-01-29 PROCEDURE — 36415 COLL VENOUS BLD VENIPUNCTURE: CPT

## 2018-01-29 PROCEDURE — 82043 UR ALBUMIN QUANTITATIVE: CPT

## 2018-03-05 ENCOUNTER — APPOINTMENT (EMERGENCY)
Dept: RADIOLOGY | Facility: HOSPITAL | Age: 83
End: 2018-03-05
Payer: MEDICARE

## 2018-03-05 ENCOUNTER — HOSPITAL ENCOUNTER (EMERGENCY)
Facility: HOSPITAL | Age: 83
Discharge: HOME | End: 2018-03-05
Attending: EMERGENCY MEDICINE | Admitting: EMERGENCY MEDICINE
Payer: MEDICARE

## 2018-03-05 VITALS
TEMPERATURE: 98.5 F | SYSTOLIC BLOOD PRESSURE: 146 MMHG | OXYGEN SATURATION: 92 % | HEIGHT: 67 IN | DIASTOLIC BLOOD PRESSURE: 65 MMHG | WEIGHT: 241 LBS | BODY MASS INDEX: 37.83 KG/M2 | HEART RATE: 75 BPM | RESPIRATION RATE: 20 BRPM

## 2018-03-05 DIAGNOSIS — S00.01XA ABRASION OF SCALP, INITIAL ENCOUNTER: ICD-10-CM
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
## 2018-03-05 DIAGNOSIS — S63.286A DISLOCATION OF PROXIMAL INTERPHALANGEAL JOINT OF RIGHT LITTLE FINGER, INITIAL ENCOUNTER: ICD-10-CM

## 2018-03-05 DIAGNOSIS — S62.627A CLOSED DISPLACED FRACTURE OF MIDDLE PHALANX OF LEFT LITTLE FINGER, INITIAL ENCOUNTER: ICD-10-CM

## 2018-03-05 DIAGNOSIS — I50.32 CHRONIC DIASTOLIC CONGESTIVE HEART FAILURE (HCC): ICD-10-CM

## 2018-03-05 PROCEDURE — 73130 X-RAY EXAM OF HAND: CPT

## 2018-03-05 PROCEDURE — 72125 CT NECK SPINE W/O DYE: CPT

## 2018-03-05 PROCEDURE — 99284 EMERGENCY DEPT VISIT MOD MDM: CPT

## 2018-03-05 PROCEDURE — 70450 CT HEAD/BRAIN W/O DYE: CPT

## 2018-03-05 PROCEDURE — 73140 X-RAY EXAM OF FINGER(S): CPT

## 2018-03-05 RX ORDER — AMLODIPINE BESYLATE 5 MG/1
5 TABLET ORAL DAILY
COMMUNITY
End: 2020-09-28

## 2018-03-05 RX ORDER — LIDOCAINE HYDROCHLORIDE 10 MG/ML
10 INJECTION, SOLUTION EPIDURAL; INFILTRATION; INTRACAUDAL; PERINEURAL ONCE
Status: COMPLETED | OUTPATIENT
Start: 2018-03-05 | End: 2018-03-05

## 2018-03-05 RX ADMIN — LIDOCAINE HYDROCHLORIDE 10 ML: 10 INJECTION, SOLUTION EPIDURAL; INFILTRATION; INTRACAUDAL; PERINEURAL at 13:22

## 2018-03-05 NOTE — DISCHARGE INSTRUCTIONS
Finger Dislocation   WHAT YOU NEED TO KNOW:   A finger dislocation occurs when bones in your finger move out of their normal position  This takes place at a joint (where bones meet)  DISCHARGE INSTRUCTIONS:   Care for your finger:  Care for your finger as directed  This may help reduce pain and swelling  It also may improve how well you can move and use your finger  · Ice your finger: This can help decrease pain and swelling  Place a plastic bag filled with ice, or an ice pack, on your finger  Apply an ice pack as often as directed  · Elevate your finger:  Keep your finger above the level of your heart  This can help reduce swelling  Prop your arm or hand on a pillow  This should be done as often as you can for the first 1 to 3 days after your injury  Medicines:   · Pain medicine: You may be given medicine to take at home to take away or decrease pain  Do not wait until the pain is too bad before taking your medicine  · Take your medicine as directed  Contact your healthcare provider if you think your medicine is not helping or if you have side effects  Tell him or her if you are allergic to any medicine  Keep a list of the medicines, vitamins, and herbs you take  Include the amounts, and when and why you take them  Bring the list or the pill bottles to follow-up visits  Carry your medicine list with you in case of an emergency  Exercise your finger:  Exercise can help reduce pain, swelling, and stiffness in your finger  It also can help increase strength and movement  You may need to exercise your finger as soon as you can  You also may be told not to move your finger for a few weeks  Be sure to follow your healthcare provider's instructions  Occupational therapy (OT) may be ordered for you  A therapist works with you to help you regain movement in your finger    Care for your splint or cast:  Your injured finger may be tejas-taped, splinted, or put in a cast to hold your finger or thumb in place while it heals  Cisco tape is when your injured finger is taped to the finger next to it  A splint is a piece of stiff material attached to your finger using cloth straps  You may have these treatments for 8 weeks or more  To care for your splint or cast:  · Do not get your splint or cast wet  Use a plastic bag to cover the splint or cast if you shower  · Keep your splint or cast clean  Make sure no dirt gets under your splint or cast     · Do not trim your cast without talking to your healthcare provider  Also, never remove your cast on your own  Follow up with your healthcare provider or hand specialist as directed:  Write down your questions so you remember to ask them during your follow-up visits  Contact your healthcare provider or hand specialist if:   · You have numbness or tingling in your hand  · The skin under your cast or splint burns or stings  · The skin around your cast becomes red or raw  · Your cast becomes cracked or damaged  · You have questions or concerns about your injury or treatment  Return to the emergency department if:   · You have increased swelling beneath your splint or cast     · You think your cast or splint is too tight  · You cannot move your fingers  © 2017 [employer]  Information is for End User's use only and may not be sold, redistributed or otherwise used for commercial purposes  All illustrations and images included in CareNotes® are the copyrighted property of [employer]  or [employer]  The above information is an  only  It is not intended as medical advice for individual conditions or treatments  Talk to your doctor, nurse or pharmacist before following any medical regimen to see if it is safe and effective for you

## 2018-03-05 NOTE — ED ATTENDING ATTESTATION
Rk Mathew MD, saw and evaluated the patient  I have discussed the patient with the resident/non-physician practitioner and agree with the resident's/non-physician practitioner's findings, Plan of Care, and MDM as documented in the resident's/non-physician practitioner's note, except where noted  All available labs and Radiology studies were reviewed  At this point I agree with the current assessment done in the Emergency Department  I have conducted an independent evaluation of this patient a history and physical is as follows:    Patient has been in his normal state of health recently and today, just prior to arrival, patient was walking to a pharmacy when he tripped on the entry way and fell forward striking his head and injuring his left pinky  The patient denies other injury and the patient is confident that this was a trip and that he did not lose consciousness or suffer a syncopal episode  The patient had no loss of consciousness and was able to ambulate after the scene and came to the emergency department in a private vehicle  The patient complains of mild tenderness on the top of his skull and mild tenderness in his left pinky  His left little finger was immobilized at the scene by the pharmacist with a syringe that he taped to his finger  The patient's last tetanus shot was within the last year  Physical exam demonstrates a pleasant alert interactive nontoxic male in no acute distress  The patient is alert and oriented x3 with a normal mental status  There is a superficial abrasion on the top of his scalp without active bleeding  There is no bony deformity or crepitance to the skull  Facial bones are nontender  The neck is supple and nontender  The thoracic and lumbar spine are nontender  The chest abdomen and pelvis are nontender  There is no rebound or guarding on abdominal exam   Both lower extremities are normal with a full range of motion to all joints   The right upper extremity is normal   There is mild tenderness in the proximal left little finger  Remainder of the left upper extremity was normal   The patient was neurovascularly intact in all extremities   Neurologic exam is completely normal     Critical Care Time  CritCare Time    Procedures

## 2018-03-05 NOTE — ED PROVIDER NOTES
History  Chief Complaint   Patient presents with    Fall     Pt states he was at the drug store and tripped up a big step  Pt hit his head and landed on left hand/arm  No LOC  Denies thinners  78-year-old male with past medical history of CHF with grade 1 diastolic dysfunction and EF of 65% as of echocardiogram done on June 13, 2017 and hypertension who is presenting status post mechanical fall  Patient states that he was walking into a pharmacy when he tripped over the top step and fell  Patient struck the top of his head on the barrier and fell onto his left hand  No loss of consciousness  This was a mechanical fall, with no prodrome or syncope  At this time, patient complains only of left pinky finger pain  Patient denies any headache, acute vision changes, extremity numbness or weakness, neck pain or stiffness, nausea, vomiting, chest pain or pressure, shortness of breath, palpitations, abdominal pain, or any other symptoms  Patient states that his breathing is at baseline  He has baseline peripheral edema secondary to his CHF  No antiplatelets or anticoagulants  Plan:  CT head to evaluate for intracranial hemorrhage; CT cervical spine to evaluate for fracture or malalignment; x-ray of the left hand to evaluate for fracture or dislocation of the left pinky finger  No labs necessary in the setting of a mechanical fall  Prior to Admission Medications   Prescriptions Last Dose Informant Patient Reported? Taking?    amLODIPine (NORVASC) 5 mg tablet   Yes Yes   Sig: Take 5 mg by mouth daily   furosemide (LASIX) 40 mg tablet   No Yes   Sig: Take 1 tablet by mouth daily   levothyroxine 88 mcg tablet   No Yes   Sig: Take 1 tablet by mouth daily in the early morning   Patient taking differently: Take 100 mcg by mouth daily in the early morning     metoprolol succinate (TOPROL-XL) 25 mg 24 hr tablet   Yes Yes   Sig: Take 25 mg by mouth daily      Facility-Administered Medications: None Past Medical History:   Diagnosis Date    CHF (congestive heart failure) (HCC)     Hypertension        Past Surgical History:   Procedure Laterality Date    PROSTATE SURGERY         History reviewed  No pertinent family history  I have reviewed and agree with the history as documented  Social History   Substance Use Topics    Smoking status: Never Smoker    Smokeless tobacco: Never Used    Alcohol use No        Review of Systems   Constitutional: Negative for diaphoresis, fever and unexpected weight change  HENT: Negative for congestion, rhinorrhea and sore throat  Eyes: Negative for pain, discharge and visual disturbance  Respiratory: Negative for cough, shortness of breath and wheezing  Cardiovascular: Negative for chest pain, palpitations and leg swelling  Gastrointestinal: Negative for abdominal pain, blood in stool, constipation, diarrhea, nausea and vomiting  Genitourinary: Negative for dysuria, flank pain and hematuria  Musculoskeletal: Positive for arthralgias  Negative for myalgias  Left pinky finger pain  Skin: Positive for wound  Negative for rash  Abrasion on the upper scalp  Bleeding controlled  Allergic/Immunologic: Negative for environmental allergies and food allergies  Neurological: Negative for dizziness, seizures, weakness and numbness  Hematological: Negative for adenopathy  Psychiatric/Behavioral: Negative for confusion and hallucinations         Physical Exam  ED Triage Vitals   Temperature Pulse Respirations Blood Pressure SpO2   03/05/18 1136 03/05/18 1136 03/05/18 1136 03/05/18 1136 03/05/18 1136   98 5 °F (36 9 °C) 87 20 155/72 95 %      Temp Source Heart Rate Source Patient Position - Orthostatic VS BP Location FiO2 (%)   03/05/18 1136 03/05/18 1252 -- 03/05/18 1136 --   Oral Monitor  Left arm       Pain Score       03/05/18 1139       5           Orthostatic Vital Signs  Vitals:    03/05/18 1136 03/05/18 1200 03/05/18 1252 03/05/18 1323   BP: 155/72 166/91 142/71 146/65   Pulse: 87 84 81 75       Physical Exam   Constitutional: He is oriented to person, place, and time  He appears well-developed and well-nourished  HENT:   Head: Normocephalic  Right Ear: External ear normal    Left Ear: External ear normal    Nose: Nose normal    Abrasion at the apex of the scalp with dried blood  Bleeding controlled at this time  No laceration  No ecchymosis  Left TM obscured by cerumen  No hemotympanum in right TM  Eyes: EOM are normal  Pupils are equal, round, and reactive to light  Neck: Normal range of motion  Neck supple  No midline cervical spine tenderness to palpation  Cardiovascular: Normal rate, regular rhythm and normal heart sounds  No murmur heard  Pulmonary/Chest: Effort normal  No respiratory distress  He has no wheezes  He has rales  Crackles at the bilateral lung bases  Abdominal: Soft  Bowel sounds are normal  He exhibits no distension  There is no tenderness  There is no guarding  Musculoskeletal: Normal range of motion  He exhibits edema and deformity  2+ peripheral edema bilaterally  Deformity of the left 5th finger at the PIP joint  Neurological: He is alert and oriented to person, place, and time  Patient is alert and oriented to time, person, place, and situation  Speech is fluent with no aphasia or dysarthria  CN II-XII are intact  Strength is 5/5 in the upper and lower extremities bilaterally  Sensation grossly intact  No dysmetria on finger to nose testing  No pronator drift  Skin: Skin is warm and dry  He is not diaphoretic  Psychiatric: He has a normal mood and affect  His behavior is normal  Judgment and thought content normal    Nursing note and vitals reviewed        ED Medications  Medications   lidocaine (PF) (XYLOCAINE-MPF) 1 % injection 10 mL (10 mL Infiltration Given 3/5/18 1322)       Diagnostic Studies  Results Reviewed     None                 XR finger fifth digit-pinkie RIGHT   ED Interpretation by Monty López MD (03/05 1405)   Interval reduction of dislocation, dorsal avulsion fracture of the middle phalanx noted  Irregularity of the base of the middle phalanx noted  Final Result by Soraya Cotto MD (03/05 1421)      Successful reduction of dorsal dislocation of left 5th proximal interphalangeal joint  Non-comminuted fractures of the palmar base of the middle phalanx and of the dorsal distal aspect of the left 5th proximal phalanx  These demonstrate near-anatomic alignment status post reduction  Workstation performed: [id number]         XR hand 3+ views LEFT   ED Interpretation by Monty López MD (03/05 1229)   Dislocation of the 5th finger at the proximal interphalangeal joint  Final Result by Soraya Cotto MD (03/05 1332)      Dorsal dislocation of left 5th proximal interphalangeal joint  Avulsion fracture at the dorsal base of the middle phalanx of the left 5th finger with fragment displaced by approximately 4 mm  As per comments in electronic health record, findings are concordant with preliminary interpretation provided by the emergency room physician  Workstation performed: [id number]         CT head without contrast   Final Result by Devang Mckee MD (03/05 1315)      No acute intracranial abnormality  Microangiopathic changes  Workstation performed: [id number]         CT cervical spine without contrast   Final Result by Devang Mckee MD (03/05 1314)      No cervical spine fracture or traumatic malalignment                     Workstation performed: [id number]               Procedures  Digital Block  Performed by: Narciso Gregorio  Authorized by: Ivan Astudillo     Consent:     Consent obtained:  Verbal    Consent given by:  Patient    Risks discussed:  Bleeding, infection, pain, allergic reaction and unsuccessful block  Indications:     Indications:  Procedural anesthesia  Location:     Block location:  Finger    Finger blocked:  L little finger  Pre-procedure details:     Neurovascular status: intact    Procedure details (see MAR for exact dosages):     Syringe type:  Controlled syringe    Needle gauge:  27 G    Anesthetic injected:  Lidocaine 1% w/o epi    Technique:  Four-sided ring block    Injection procedure:  Anatomic landmarks identified, anatomic landmarks palpated, introduced needle, incremental injection and negative aspiration for blood  Post-procedure details:     Outcome:  Anesthesia achieved  Comments:      Digital block for dislocation reduction  Orthopedic Injury  Date/Time: 3/5/2018 1:39 PM  Performed by: Kenzie Albright  Authorized by: Crispin Reynoso   Consent: Verbal consent obtained    Consent given by: patient  Site marked: the operative site was marked  Imaging studies: imaging studies available  Patient identity confirmed: verbally with patient and arm band  Injury location: hand  Location details: left hand  Injury type: fracture-dislocation  Pre-procedure neurovascular assessment: neurovascularly intact  Pre-procedure distal perfusion: normal  Pre-procedure neurological function: normal  Pre-procedure range of motion: normal  Anesthesia: digital block    Anesthesia:  Local anesthesia used: yes  Local Anesthetic: lidocaine 1% without epinephrine  Manipulation performed: yes  Skin traction used: no  Skeletal traction used: no  Reduction successful: yes  X-ray confirmed reduction: yes  Immobilization: splint  Splint type: finger splint, static  Supplies used: aluminum splint  Post-procedure neurovascular assessment: post-procedure neurovascularly intact  Post-procedure distal perfusion: normal  Post-procedure neurological function: normal  Post-procedure range of motion: normal  Patient tolerance: Patient tolerated the procedure well with no immediate complications  Comments: Successful reduction of fracture dislocation of the left 5th finger at the proximal interphalangeal joint             Phone Consults  ED Phone Contact    ED Course  ED Course as of Mar 05 1834   Mon Mar 05, 2018   1145 Blood Pressure: 155/72   1145 Temperature: 98 5 °F (36 9 °C)   1145 Pulse: 87   1145 Respirations: 20   1145 SpO2: 95 %   1334 Dorsal dislocation of the left 5th finger at the PIP joint  There is also an avulsion fracture at the dorsal aspect of the middle phalanx  XR hand 3+ views LEFT   1335 CT head and CT cervical spine negative for acute pathology  1341 Successful reduction of dislocation under digital block  Awaiting x-ray confirmation  Identification of Seniors at [facility] Most Recent Value   (ISAR) Identification of Seniors at Risk   Before the illness or injury that brought you to the Emergency, did you need someone to help you on a regular basis? 0 Filed at: 03/05/2018 1138   In the last 24 hours, have you needed more help than usual?  0 Filed at: 03/05/2018 1138   Have you been hospitalized for one or more nights during the past 6 months? 0 Filed at: 03/05/2018 1138   In general, do you see well? 1 Filed at: 03/05/2018 1138   In general, do you have serious problems with your memory? 0 Filed at: 03/05/2018 1138   Do you take more than three different medications every day? 1 Filed at: 03/05/2018 1138   ISAR Score  2 Filed at: 03/05/2018 1138                          MDM  Number of Diagnoses or Management Options  Abrasion of scalp, initial encounter: new and requires workup  Chronic diastolic congestive heart failure ([facility] [address] ): established and improving  Closed displaced fracture of middle phalanx of left little finger, initial encounter: new and requires workup  Dislocation of proximal interphalangeal joint of right little finger, initial encounter: new and requires workup  Fall, initial encounter: new and requires workup  Diagnosis management comments:     80year old male presenting after mechanical fall as detailed in the history of present illness  Complained only of left 5th finger pain  Vital signs were normal  Physical examination demonstrated abrasion at the apex of the scalp and deformity of the left 5th finger at the PIP joint  Neurological examination was non-focal  CT head and CT cervical spine were negative for acute pathology  X-ray of the left hand demonstrated dorsal dislocation of the left 5th finger at the PIP joint with a small avulsion fracture  Dislocation was reduced under digital block with good result  Repeat x-ray revealed avulsion fracture and additional fracture at the base of the middle phalanx  The left 5th finger was splinted  Patient was provided with Orthopedic Surgery follow up  Portions of the chart may have been created with voice recognition software  Occasional wrong word or "sound a like" substitutions may have occurred due to the inherent limitations of voice recognition software  Please read the chart carefully and recognize, using context, where substitutions have occurred         Amount and/or Complexity of Data Reviewed  Tests in the radiology section of CPT®: ordered and reviewed  Decide to obtain previous medical records or to obtain history from someone other than the patient: yes  Obtain history from someone other than the patient: yes  Review and summarize past medical records: yes  Independent visualization of images, tracings, or specimens: yes    Risk of Complications, Morbidity, and/or Mortality  Presenting problems: moderate  Diagnostic procedures: minimal  Management options: minimal    Patient Progress  Patient progress: improved    CritCare Time    Disposition  Final diagnoses:   Fall, initial encounter   Abrasion of scalp, initial encounter   Dislocation of proximal interphalangeal joint of right little finger, initial encounter   Closed displaced fracture of middle phalanx of left little finger, initial encounter   Chronic diastolic congestive heart failure ([facility] [address] )     Time reflects when diagnosis was documented in both MDM as applicable and the Disposition within this note     Time User Action Codes Description Comment    3/5/2018  2:08 PM Lee Richard Add [X10  NOGR] Fall, initial encounter     3/5/2018  2:08 PM Lee Richard Add [S00 01XA] Abrasion of scalp, initial encounter     3/5/2018  2:09 PM Lee Richard Add [V01 202C] Dislocation of proximal interphalangeal joint of right little finger, initial encounter     3/5/2018  2:10 PM Lee Richard Add [E13 886H] Closed displaced fracture of middle phalanx of left little finger, initial encounter     3/5/2018  2:10 PM Lee Richard Add [L48 44] Chronic diastolic congestive heart failure [facility])       ED Disposition     ED Disposition Condition Comment    Discharge  Aliciaellis Arenas discharge to home/self care  Condition at discharge: Good        Follow-up Information     Follow up With Specialties Details Why [address], MD Orthopedic Surgery Call today Please make follow up appointment for within 1 week  Do not remove splint until seen  [facility]          Discharge Medication List as of 3/5/2018  2:12 PM      CONTINUE these medications which have NOT CHANGED    Details   amLODIPine (NORVASC) 5 mg tablet Take 5 mg by mouth daily, Historical Med      furosemide (LASIX) 40 mg tablet Take 1 tablet by mouth daily, Starting Thu 6/15/2017, Print      levothyroxine 88 mcg tablet Take 1 tablet by mouth daily in the early morning, Starting Thu 6/15/2017, Print      metoprolol succinate (TOPROL-XL) 25 mg 24 hr tablet Take 25 mg by mouth daily, Historical Med           No discharge procedures on file  ED Provider  Attending physically available and evaluated Lambert Arenas I managed the patient along with the ED Attending      Electronically Signed by         Vianney Rich MD  03/05/18 6324

## 2018-03-05 NOTE — SOCIAL WORK
Cm consulted to discuss senior resources  Pt and wife reside alone  Pt's wife reports that she takes care of all the shopping and cooking  Pt reports that he is independent for all activities  He does not use any DME and denies any need for services at this time  Pt and family given [facility] resource booklet and educated on [facility] on [address]

## 2018-03-26 ENCOUNTER — TRANSCRIBE ORDERS (OUTPATIENT)
Dept: LAB | Facility: HOSPITAL | Age: 83
End: 2018-03-26

## 2018-03-26 ENCOUNTER — APPOINTMENT (OUTPATIENT)
Dept: LAB | Facility: HOSPITAL | Age: 83
End: 2018-03-26
Attending: INTERNAL MEDICINE
Payer: MEDICARE

## 2018-03-26 DIAGNOSIS — I51.9 MYXEDEMA HEART DISEASE: ICD-10-CM

## 2018-03-26 DIAGNOSIS — E03.9 MYXEDEMA HEART DISEASE: ICD-10-CM

## 2018-03-26 DIAGNOSIS — I51.9 MYXEDEMA HEART DISEASE: Primary | ICD-10-CM

## 2018-03-26 DIAGNOSIS — E03.9 MYXEDEMA HEART DISEASE: Primary | ICD-10-CM

## 2018-03-26 LAB
T4 FREE SERPL-MCNC: 0.82 NG/DL (ref 0.76–1.46)
TSH SERPL DL<=0.05 MIU/L-ACNC: 4.07 UIU/ML (ref 0.36–3.74)

## 2018-03-26 PROCEDURE — 84443 ASSAY THYROID STIM HORMONE: CPT

## 2018-03-26 PROCEDURE — 36415 COLL VENOUS BLD VENIPUNCTURE: CPT

## 2018-03-26 PROCEDURE — 84439 ASSAY OF FREE THYROXINE: CPT

## 2018-05-30 ENCOUNTER — TRANSCRIBE ORDERS (OUTPATIENT)
Dept: LAB | Facility: HOSPITAL | Age: 83
End: 2018-05-30

## 2018-05-30 ENCOUNTER — APPOINTMENT (OUTPATIENT)
Dept: LAB | Facility: HOSPITAL | Age: 83
End: 2018-05-30
Attending: INTERNAL MEDICINE
Payer: MEDICARE

## 2018-05-30 DIAGNOSIS — I50.20 SYSTOLIC HEART FAILURE, UNSPECIFIED HF CHRONICITY (HCC): ICD-10-CM

## 2018-05-30 DIAGNOSIS — R73.01 IMPAIRED FASTING GLUCOSE: ICD-10-CM

## 2018-05-30 DIAGNOSIS — I50.20 SYSTOLIC CONGESTIVE HEART FAILURE, UNSPECIFIED HF CHRONICITY (HCC): ICD-10-CM

## 2018-05-30 DIAGNOSIS — I50.20 SYSTOLIC CONGESTIVE HEART FAILURE, UNSPECIFIED HF CHRONICITY (HCC): Primary | ICD-10-CM

## 2018-05-30 LAB
ALBUMIN SERPL BCP-MCNC: 3.5 G/DL (ref 3.5–5)
ALP SERPL-CCNC: 65 U/L (ref 46–116)
ALT SERPL W P-5'-P-CCNC: 15 U/L (ref 12–78)
ANION GAP SERPL CALCULATED.3IONS-SCNC: 8 MMOL/L (ref 4–13)
AST SERPL W P-5'-P-CCNC: 16 U/L (ref 5–45)
BACTERIA UR QL AUTO: ABNORMAL /HPF
BASOPHILS # BLD AUTO: 0.07 THOUSANDS/ΜL (ref 0–0.1)
BASOPHILS NFR BLD AUTO: 1 % (ref 0–1)
BILIRUB SERPL-MCNC: 0.63 MG/DL (ref 0.2–1)
BILIRUB UR QL STRIP: NEGATIVE
BUN SERPL-MCNC: 33 MG/DL (ref 5–25)
CALCIUM SERPL-MCNC: 8.9 MG/DL (ref 8.3–10.1)
CHLORIDE SERPL-SCNC: 105 MMOL/L (ref 100–108)
CLARITY UR: CLEAR
CO2 SERPL-SCNC: 28 MMOL/L (ref 21–32)
COLOR UR: YELLOW
CREAT SERPL-MCNC: 2.23 MG/DL (ref 0.6–1.3)
EOSINOPHIL # BLD AUTO: 0.14 THOUSAND/ΜL (ref 0–0.61)
EOSINOPHIL NFR BLD AUTO: 2 % (ref 0–6)
ERYTHROCYTE [DISTWIDTH] IN BLOOD BY AUTOMATED COUNT: 14.3 % (ref 11.6–15.1)
EST. AVERAGE GLUCOSE BLD GHB EST-MCNC: 123 MG/DL
GFR SERPL CREATININE-BSD FRML MDRD: 26 ML/MIN/1.73SQ M
GLUCOSE SERPL-MCNC: 83 MG/DL (ref 65–140)
GLUCOSE UR STRIP-MCNC: NEGATIVE MG/DL
HBA1C MFR BLD: 5.9 % (ref 4.2–6.3)
HCT VFR BLD AUTO: 38.8 % (ref 36.5–49.3)
HGB BLD-MCNC: 12.2 G/DL (ref 12–17)
HGB UR QL STRIP.AUTO: NEGATIVE
HYALINE CASTS #/AREA URNS LPF: ABNORMAL /LPF
IMM GRANULOCYTES # BLD AUTO: 0.02 THOUSAND/UL (ref 0–0.2)
IMM GRANULOCYTES NFR BLD AUTO: 0 % (ref 0–2)
KETONES UR STRIP-MCNC: NEGATIVE MG/DL
LEUKOCYTE ESTERASE UR QL STRIP: ABNORMAL
LYMPHOCYTES # BLD AUTO: 2.23 THOUSANDS/ΜL (ref 0.6–4.47)
LYMPHOCYTES NFR BLD AUTO: 31 % (ref 14–44)
MAGNESIUM SERPL-MCNC: 2.4 MG/DL (ref 1.6–2.6)
MCH RBC QN AUTO: 30 PG (ref 26.8–34.3)
MCHC RBC AUTO-ENTMCNC: 31.4 G/DL (ref 31.4–37.4)
MCV RBC AUTO: 96 FL (ref 82–98)
MONOCYTES # BLD AUTO: 0.81 THOUSAND/ΜL (ref 0.17–1.22)
MONOCYTES NFR BLD AUTO: 11 % (ref 4–12)
NEUTROPHILS # BLD AUTO: 4 THOUSANDS/ΜL (ref 1.85–7.62)
NEUTS SEG NFR BLD AUTO: 55 % (ref 43–75)
NITRITE UR QL STRIP: NEGATIVE
NON-SQ EPI CELLS URNS QL MICRO: ABNORMAL /HPF
NRBC BLD AUTO-RTO: 0 /100 WBCS
NT-PROBNP SERPL-MCNC: 635 PG/ML
PH UR STRIP.AUTO: 6 [PH] (ref 4.5–8)
PLATELET # BLD AUTO: 184 THOUSANDS/UL (ref 149–390)
PMV BLD AUTO: 11.6 FL (ref 8.9–12.7)
POTASSIUM SERPL-SCNC: 4.1 MMOL/L (ref 3.5–5.3)
PROT SERPL-MCNC: 7.5 G/DL (ref 6.4–8.2)
PROT UR STRIP-MCNC: ABNORMAL MG/DL
RBC # BLD AUTO: 4.06 MILLION/UL (ref 3.88–5.62)
RBC #/AREA URNS AUTO: ABNORMAL /HPF
SODIUM SERPL-SCNC: 141 MMOL/L (ref 136–145)
SP GR UR STRIP.AUTO: 1.02 (ref 1–1.03)
T4 FREE SERPL-MCNC: 1.08 NG/DL (ref 0.76–1.46)
TSH SERPL DL<=0.05 MIU/L-ACNC: 1.63 UIU/ML (ref 0.36–3.74)
UROBILINOGEN UR QL STRIP.AUTO: 0.2 E.U./DL
WBC # BLD AUTO: 7.27 THOUSAND/UL (ref 4.31–10.16)
WBC #/AREA URNS AUTO: ABNORMAL /HPF

## 2018-05-30 PROCEDURE — 84443 ASSAY THYROID STIM HORMONE: CPT

## 2018-05-30 PROCEDURE — 84439 ASSAY OF FREE THYROXINE: CPT

## 2018-05-30 PROCEDURE — 83880 ASSAY OF NATRIURETIC PEPTIDE: CPT

## 2018-05-30 PROCEDURE — 36415 COLL VENOUS BLD VENIPUNCTURE: CPT

## 2018-05-30 PROCEDURE — 81001 URINALYSIS AUTO W/SCOPE: CPT

## 2018-05-30 PROCEDURE — 85025 COMPLETE CBC W/AUTO DIFF WBC: CPT

## 2018-05-30 PROCEDURE — 83036 HEMOGLOBIN GLYCOSYLATED A1C: CPT

## 2018-05-30 PROCEDURE — 80053 COMPREHEN METABOLIC PANEL: CPT

## 2018-05-30 PROCEDURE — 83735 ASSAY OF MAGNESIUM: CPT

## 2018-06-11 ENCOUNTER — APPOINTMENT (OUTPATIENT)
Dept: LAB | Facility: HOSPITAL | Age: 83
End: 2018-06-11
Attending: INTERNAL MEDICINE
Payer: MEDICARE

## 2018-06-11 ENCOUNTER — TRANSCRIBE ORDERS (OUTPATIENT)
Dept: LAB | Facility: HOSPITAL | Age: 83
End: 2018-06-11

## 2018-06-11 DIAGNOSIS — N10 ACUTE PYELONEPHRITIS: Primary | ICD-10-CM

## 2018-06-11 DIAGNOSIS — N10 ACUTE PYELONEPHRITIS: ICD-10-CM

## 2018-06-11 PROCEDURE — 87086 URINE CULTURE/COLONY COUNT: CPT

## 2018-06-12 LAB — BACTERIA UR CULT: NORMAL

## 2018-08-14 ENCOUNTER — TRANSCRIBE ORDERS (OUTPATIENT)
Dept: LAB | Facility: HOSPITAL | Age: 83
End: 2018-08-14

## 2018-08-14 ENCOUNTER — APPOINTMENT (OUTPATIENT)
Dept: LAB | Facility: HOSPITAL | Age: 83
End: 2018-08-14
Attending: INTERNAL MEDICINE
Payer: MEDICARE

## 2018-08-14 DIAGNOSIS — I50.20 SYSTOLIC HEART FAILURE, UNSPECIFIED HF CHRONICITY (HCC): ICD-10-CM

## 2018-08-14 DIAGNOSIS — I50.20 SYSTOLIC HEART FAILURE, UNSPECIFIED HF CHRONICITY (HCC): Primary | ICD-10-CM

## 2018-08-14 LAB
ALBUMIN SERPL BCP-MCNC: 3.3 G/DL (ref 3.5–5)
ALP SERPL-CCNC: 59 U/L (ref 46–116)
ALT SERPL W P-5'-P-CCNC: 10 U/L (ref 12–78)
ANION GAP SERPL CALCULATED.3IONS-SCNC: 7 MMOL/L (ref 4–13)
AST SERPL W P-5'-P-CCNC: 13 U/L (ref 5–45)
BACTERIA UR QL AUTO: ABNORMAL /HPF
BASOPHILS # BLD AUTO: 0.06 THOUSANDS/ΜL (ref 0–0.1)
BASOPHILS NFR BLD AUTO: 1 % (ref 0–1)
BILIRUB SERPL-MCNC: 0.55 MG/DL (ref 0.2–1)
BILIRUB UR QL STRIP: NEGATIVE
BUN SERPL-MCNC: 29 MG/DL (ref 5–25)
CALCIUM SERPL-MCNC: 8.4 MG/DL (ref 8.3–10.1)
CHLORIDE SERPL-SCNC: 104 MMOL/L (ref 100–108)
CLARITY UR: CLEAR
CO2 SERPL-SCNC: 28 MMOL/L (ref 21–32)
COLOR UR: YELLOW
CREAT SERPL-MCNC: 1.85 MG/DL (ref 0.6–1.3)
EOSINOPHIL # BLD AUTO: 0.15 THOUSAND/ΜL (ref 0–0.61)
EOSINOPHIL NFR BLD AUTO: 2 % (ref 0–6)
ERYTHROCYTE [DISTWIDTH] IN BLOOD BY AUTOMATED COUNT: 14.2 % (ref 11.6–15.1)
FERRITIN SERPL-MCNC: 117 NG/ML (ref 8–388)
GFR SERPL CREATININE-BSD FRML MDRD: 32 ML/MIN/1.73SQ M
GLUCOSE P FAST SERPL-MCNC: 94 MG/DL (ref 65–99)
GLUCOSE UR STRIP-MCNC: NEGATIVE MG/DL
HCT VFR BLD AUTO: 37.7 % (ref 36.5–49.3)
HGB BLD-MCNC: 11.9 G/DL (ref 12–17)
HGB UR QL STRIP.AUTO: NEGATIVE
HYALINE CASTS #/AREA URNS LPF: ABNORMAL /LPF
IMM GRANULOCYTES # BLD AUTO: 0.02 THOUSAND/UL (ref 0–0.2)
IMM GRANULOCYTES NFR BLD AUTO: 0 % (ref 0–2)
IRON SERPL-MCNC: 64 UG/DL (ref 65–175)
KETONES UR STRIP-MCNC: NEGATIVE MG/DL
LEUKOCYTE ESTERASE UR QL STRIP: ABNORMAL
LYMPHOCYTES # BLD AUTO: 1.93 THOUSANDS/ΜL (ref 0.6–4.47)
LYMPHOCYTES NFR BLD AUTO: 27 % (ref 14–44)
MCH RBC QN AUTO: 30.1 PG (ref 26.8–34.3)
MCHC RBC AUTO-ENTMCNC: 31.6 G/DL (ref 31.4–37.4)
MCV RBC AUTO: 95 FL (ref 82–98)
MONOCYTES # BLD AUTO: 0.72 THOUSAND/ΜL (ref 0.17–1.22)
MONOCYTES NFR BLD AUTO: 10 % (ref 4–12)
NEUTROPHILS # BLD AUTO: 4.3 THOUSANDS/ΜL (ref 1.85–7.62)
NEUTS SEG NFR BLD AUTO: 60 % (ref 43–75)
NITRITE UR QL STRIP: NEGATIVE
NON-SQ EPI CELLS URNS QL MICRO: ABNORMAL /HPF
NRBC BLD AUTO-RTO: 0 /100 WBCS
PH UR STRIP.AUTO: 7 [PH] (ref 4.5–8)
PLATELET # BLD AUTO: 174 THOUSANDS/UL (ref 149–390)
PMV BLD AUTO: 11.7 FL (ref 8.9–12.7)
POTASSIUM SERPL-SCNC: 4.1 MMOL/L (ref 3.5–5.3)
PROT SERPL-MCNC: 7.5 G/DL (ref 6.4–8.2)
PROT UR STRIP-MCNC: ABNORMAL MG/DL
RBC # BLD AUTO: 3.95 MILLION/UL (ref 3.88–5.62)
RBC #/AREA URNS AUTO: ABNORMAL /HPF
SODIUM SERPL-SCNC: 139 MMOL/L (ref 136–145)
SP GR UR STRIP.AUTO: 1.01 (ref 1–1.03)
UROBILINOGEN UR QL STRIP.AUTO: 1 E.U./DL
WBC # BLD AUTO: 7.18 THOUSAND/UL (ref 4.31–10.16)
WBC #/AREA URNS AUTO: ABNORMAL /HPF

## 2018-08-14 PROCEDURE — 36415 COLL VENOUS BLD VENIPUNCTURE: CPT

## 2018-08-14 PROCEDURE — 83540 ASSAY OF IRON: CPT

## 2018-08-14 PROCEDURE — 85025 COMPLETE CBC W/AUTO DIFF WBC: CPT

## 2018-08-14 PROCEDURE — 80053 COMPREHEN METABOLIC PANEL: CPT

## 2018-08-14 PROCEDURE — 82728 ASSAY OF FERRITIN: CPT

## 2018-08-14 PROCEDURE — 81001 URINALYSIS AUTO W/SCOPE: CPT

## 2019-05-06 ENCOUNTER — APPOINTMENT (OUTPATIENT)
Dept: LAB | Facility: HOSPITAL | Age: 84
End: 2019-05-06
Attending: INTERNAL MEDICINE
Payer: MEDICARE

## 2019-05-06 ENCOUNTER — TRANSCRIBE ORDERS (OUTPATIENT)
Dept: LAB | Facility: HOSPITAL | Age: 84
End: 2019-05-06

## 2019-05-06 DIAGNOSIS — E61.1 IRON DEFICIENCY: Primary | ICD-10-CM

## 2019-05-06 DIAGNOSIS — E61.1 IRON DEFICIENCY: ICD-10-CM

## 2019-05-06 DIAGNOSIS — R73.01 IMPAIRED FASTING GLUCOSE: ICD-10-CM

## 2019-05-06 LAB
ALBUMIN SERPL BCP-MCNC: 3.4 G/DL (ref 3.5–5)
ALP SERPL-CCNC: 69 U/L (ref 46–116)
ALT SERPL W P-5'-P-CCNC: 12 U/L (ref 12–78)
ANION GAP SERPL CALCULATED.3IONS-SCNC: 4 MMOL/L (ref 4–13)
AST SERPL W P-5'-P-CCNC: 14 U/L (ref 5–45)
BASOPHILS # BLD AUTO: 0.08 THOUSANDS/ΜL (ref 0–0.1)
BASOPHILS NFR BLD AUTO: 1 % (ref 0–1)
BILIRUB SERPL-MCNC: 0.51 MG/DL (ref 0.2–1)
BILIRUB UR QL STRIP: NEGATIVE
BUN SERPL-MCNC: 26 MG/DL (ref 5–25)
CALCIUM SERPL-MCNC: 8.3 MG/DL (ref 8.3–10.1)
CHLORIDE SERPL-SCNC: 104 MMOL/L (ref 100–108)
CLARITY UR: CLEAR
CO2 SERPL-SCNC: 30 MMOL/L (ref 21–32)
COLOR UR: YELLOW
CREAT SERPL-MCNC: 2.01 MG/DL (ref 0.6–1.3)
EOSINOPHIL # BLD AUTO: 0.21 THOUSAND/ΜL (ref 0–0.61)
EOSINOPHIL NFR BLD AUTO: 3 % (ref 0–6)
ERYTHROCYTE [DISTWIDTH] IN BLOOD BY AUTOMATED COUNT: 14.4 % (ref 11.6–15.1)
EST. AVERAGE GLUCOSE BLD GHB EST-MCNC: 128 MG/DL
FERRITIN SERPL-MCNC: 120 NG/ML (ref 8–388)
GFR SERPL CREATININE-BSD FRML MDRD: 29 ML/MIN/1.73SQ M
GLUCOSE P FAST SERPL-MCNC: 94 MG/DL (ref 65–99)
GLUCOSE UR STRIP-MCNC: NEGATIVE MG/DL
HBA1C MFR BLD: 6.1 % (ref 4.2–6.3)
HCT VFR BLD AUTO: 39 % (ref 36.5–49.3)
HGB BLD-MCNC: 12.2 G/DL (ref 12–17)
HGB UR QL STRIP.AUTO: NEGATIVE
IMM GRANULOCYTES # BLD AUTO: 0.03 THOUSAND/UL (ref 0–0.2)
IMM GRANULOCYTES NFR BLD AUTO: 0 % (ref 0–2)
IRON SERPL-MCNC: 80 UG/DL (ref 65–175)
KETONES UR STRIP-MCNC: NEGATIVE MG/DL
LEUKOCYTE ESTERASE UR QL STRIP: NEGATIVE
LYMPHOCYTES # BLD AUTO: 2.25 THOUSANDS/ΜL (ref 0.6–4.47)
LYMPHOCYTES NFR BLD AUTO: 28 % (ref 14–44)
MCH RBC QN AUTO: 29.8 PG (ref 26.8–34.3)
MCHC RBC AUTO-ENTMCNC: 31.3 G/DL (ref 31.4–37.4)
MCV RBC AUTO: 95 FL (ref 82–98)
MONOCYTES # BLD AUTO: 0.83 THOUSAND/ΜL (ref 0.17–1.22)
MONOCYTES NFR BLD AUTO: 10 % (ref 4–12)
NEUTROPHILS # BLD AUTO: 4.72 THOUSANDS/ΜL (ref 1.85–7.62)
NEUTS SEG NFR BLD AUTO: 58 % (ref 43–75)
NITRITE UR QL STRIP: NEGATIVE
NRBC BLD AUTO-RTO: 0 /100 WBCS
PH UR STRIP.AUTO: 6 [PH]
PLATELET # BLD AUTO: 195 THOUSANDS/UL (ref 149–390)
PMV BLD AUTO: 11.8 FL (ref 8.9–12.7)
POTASSIUM SERPL-SCNC: 4 MMOL/L (ref 3.5–5.3)
PROT SERPL-MCNC: 7.6 G/DL (ref 6.4–8.2)
PROT UR STRIP-MCNC: NEGATIVE MG/DL
RBC # BLD AUTO: 4.09 MILLION/UL (ref 3.88–5.62)
SODIUM SERPL-SCNC: 138 MMOL/L (ref 136–145)
SP GR UR STRIP.AUTO: 1.01 (ref 1–1.03)
TSH SERPL DL<=0.05 MIU/L-ACNC: 3.74 UIU/ML (ref 0.36–3.74)
UROBILINOGEN UR QL STRIP.AUTO: 0.2 E.U./DL
WBC # BLD AUTO: 8.12 THOUSAND/UL (ref 4.31–10.16)

## 2019-05-06 PROCEDURE — 84443 ASSAY THYROID STIM HORMONE: CPT

## 2019-05-06 PROCEDURE — 83036 HEMOGLOBIN GLYCOSYLATED A1C: CPT

## 2019-05-06 PROCEDURE — 81003 URINALYSIS AUTO W/O SCOPE: CPT

## 2019-05-06 PROCEDURE — 82728 ASSAY OF FERRITIN: CPT

## 2019-05-06 PROCEDURE — 83540 ASSAY OF IRON: CPT

## 2019-05-06 PROCEDURE — 36415 COLL VENOUS BLD VENIPUNCTURE: CPT

## 2019-05-06 PROCEDURE — 80053 COMPREHEN METABOLIC PANEL: CPT

## 2019-05-06 PROCEDURE — 85025 COMPLETE CBC W/AUTO DIFF WBC: CPT

## 2019-07-05 ENCOUNTER — APPOINTMENT (OUTPATIENT)
Dept: LAB | Facility: HOSPITAL | Age: 84
End: 2019-07-05
Attending: INTERNAL MEDICINE
Payer: MEDICARE

## 2019-07-05 ENCOUNTER — TRANSCRIBE ORDERS (OUTPATIENT)
Dept: ADMINISTRATIVE | Facility: HOSPITAL | Age: 84
End: 2019-07-05

## 2019-07-05 DIAGNOSIS — I51.9 MYXEDEMA HEART DISEASE: ICD-10-CM

## 2019-07-05 DIAGNOSIS — I50.20 SYSTOLIC HEART FAILURE, UNSPECIFIED HF CHRONICITY (HCC): ICD-10-CM

## 2019-07-05 DIAGNOSIS — I50.20 SYSTOLIC HEART FAILURE, UNSPECIFIED HF CHRONICITY (HCC): Primary | ICD-10-CM

## 2019-07-05 DIAGNOSIS — R00.1 BRADYCARDIA, UNSPECIFIED: ICD-10-CM

## 2019-07-05 DIAGNOSIS — E03.9 MYXEDEMA HEART DISEASE: ICD-10-CM

## 2019-07-05 LAB
ALBUMIN SERPL BCP-MCNC: 3.2 G/DL (ref 3.5–5)
ALP SERPL-CCNC: 65 U/L (ref 46–116)
ALT SERPL W P-5'-P-CCNC: 9 U/L (ref 12–78)
ANION GAP SERPL CALCULATED.3IONS-SCNC: 9 MMOL/L (ref 4–13)
AST SERPL W P-5'-P-CCNC: 12 U/L (ref 5–45)
BACTERIA UR QL AUTO: ABNORMAL /HPF
BASOPHILS # BLD AUTO: 0.06 THOUSANDS/ΜL (ref 0–0.1)
BASOPHILS NFR BLD AUTO: 1 % (ref 0–1)
BILIRUB SERPL-MCNC: 0.56 MG/DL (ref 0.2–1)
BILIRUB UR QL STRIP: NEGATIVE
BUN SERPL-MCNC: 32 MG/DL (ref 5–25)
CALCIUM SERPL-MCNC: 8.7 MG/DL (ref 8.3–10.1)
CHLORIDE SERPL-SCNC: 106 MMOL/L (ref 100–108)
CLARITY UR: CLEAR
CO2 SERPL-SCNC: 27 MMOL/L (ref 21–32)
COLOR UR: ABNORMAL
CREAT SERPL-MCNC: 2.09 MG/DL (ref 0.6–1.3)
EOSINOPHIL # BLD AUTO: 0.1 THOUSAND/ΜL (ref 0–0.61)
EOSINOPHIL NFR BLD AUTO: 1 % (ref 0–6)
ERYTHROCYTE [DISTWIDTH] IN BLOOD BY AUTOMATED COUNT: 14.3 % (ref 11.6–15.1)
GFR SERPL CREATININE-BSD FRML MDRD: 27 ML/MIN/1.73SQ M
GLUCOSE P FAST SERPL-MCNC: 90 MG/DL (ref 65–99)
GLUCOSE UR STRIP-MCNC: NEGATIVE MG/DL
HCT VFR BLD AUTO: 36.4 % (ref 36.5–49.3)
HGB BLD-MCNC: 11.5 G/DL (ref 12–17)
HGB UR QL STRIP.AUTO: NEGATIVE
HYALINE CASTS #/AREA URNS LPF: ABNORMAL /LPF
IMM GRANULOCYTES # BLD AUTO: 0.03 THOUSAND/UL (ref 0–0.2)
IMM GRANULOCYTES NFR BLD AUTO: 0 % (ref 0–2)
KETONES UR STRIP-MCNC: NEGATIVE MG/DL
LEUKOCYTE ESTERASE UR QL STRIP: ABNORMAL
LYMPHOCYTES # BLD AUTO: 1.59 THOUSANDS/ΜL (ref 0.6–4.47)
LYMPHOCYTES NFR BLD AUTO: 22 % (ref 14–44)
MAGNESIUM SERPL-MCNC: 2.2 MG/DL (ref 1.6–2.6)
MCH RBC QN AUTO: 30.3 PG (ref 26.8–34.3)
MCHC RBC AUTO-ENTMCNC: 31.6 G/DL (ref 31.4–37.4)
MCV RBC AUTO: 96 FL (ref 82–98)
MONOCYTES # BLD AUTO: 0.77 THOUSAND/ΜL (ref 0.17–1.22)
MONOCYTES NFR BLD AUTO: 11 % (ref 4–12)
NEUTROPHILS # BLD AUTO: 4.79 THOUSANDS/ΜL (ref 1.85–7.62)
NEUTS SEG NFR BLD AUTO: 65 % (ref 43–75)
NITRITE UR QL STRIP: NEGATIVE
NON-SQ EPI CELLS URNS QL MICRO: ABNORMAL /HPF
NRBC BLD AUTO-RTO: 0 /100 WBCS
NT-PROBNP SERPL-MCNC: 1072 PG/ML
PH UR STRIP.AUTO: 5.5 [PH]
PLATELET # BLD AUTO: 164 THOUSANDS/UL (ref 149–390)
PMV BLD AUTO: 11.9 FL (ref 8.9–12.7)
POTASSIUM SERPL-SCNC: 4.1 MMOL/L (ref 3.5–5.3)
PROT SERPL-MCNC: 7.4 G/DL (ref 6.4–8.2)
PROT UR STRIP-MCNC: ABNORMAL MG/DL
RBC # BLD AUTO: 3.8 MILLION/UL (ref 3.88–5.62)
RBC #/AREA URNS AUTO: ABNORMAL /HPF
SODIUM SERPL-SCNC: 142 MMOL/L (ref 136–145)
SP GR UR STRIP.AUTO: 1.02 (ref 1–1.03)
TSH SERPL DL<=0.05 MIU/L-ACNC: 1.91 UIU/ML (ref 0.36–3.74)
UROBILINOGEN UR QL STRIP.AUTO: 0.2 E.U./DL
WBC # BLD AUTO: 7.34 THOUSAND/UL (ref 4.31–10.16)
WBC #/AREA URNS AUTO: ABNORMAL /HPF

## 2019-07-05 PROCEDURE — 80053 COMPREHEN METABOLIC PANEL: CPT

## 2019-07-05 PROCEDURE — 36415 COLL VENOUS BLD VENIPUNCTURE: CPT

## 2019-07-05 PROCEDURE — 81001 URINALYSIS AUTO W/SCOPE: CPT | Performed by: INTERNAL MEDICINE

## 2019-07-05 PROCEDURE — 84443 ASSAY THYROID STIM HORMONE: CPT

## 2019-07-05 PROCEDURE — 86618 LYME DISEASE ANTIBODY: CPT

## 2019-07-05 PROCEDURE — 85025 COMPLETE CBC W/AUTO DIFF WBC: CPT

## 2019-07-05 PROCEDURE — 83735 ASSAY OF MAGNESIUM: CPT

## 2019-07-05 PROCEDURE — 83880 ASSAY OF NATRIURETIC PEPTIDE: CPT

## 2019-07-05 PROCEDURE — 86617 LYME DISEASE ANTIBODY: CPT

## 2019-07-07 LAB
B BURGDOR IGG SER IA-ACNC: 2.46
B BURGDOR IGM SER IA-ACNC: 0.3

## 2019-07-09 LAB
B BURGDOR IGG PATRN SER IB-IMP: NEGATIVE
B BURGDOR IGM PATRN SER IB-IMP: NEGATIVE
B BURGDOR18KD IGG SER QL IB: PRESENT
B BURGDOR23KD IGG SER QL IB: ABNORMAL
B BURGDOR23KD IGM SER QL IB: ABNORMAL
B BURGDOR28KD IGG SER QL IB: ABNORMAL
B BURGDOR30KD IGG SER QL IB: ABNORMAL
B BURGDOR39KD IGG SER QL IB: PRESENT
B BURGDOR39KD IGM SER QL IB: ABNORMAL
B BURGDOR41KD IGG SER QL IB: PRESENT
B BURGDOR41KD IGM SER QL IB: ABNORMAL
B BURGDOR45KD IGG SER QL IB: ABNORMAL
B BURGDOR58KD IGG SER QL IB: PRESENT
B BURGDOR66KD IGG SER QL IB: ABNORMAL
B BURGDOR93KD IGG SER QL IB: ABNORMAL

## 2019-08-02 ENCOUNTER — HOSPITAL ENCOUNTER (OUTPATIENT)
Dept: NON INVASIVE DIAGNOSTICS | Facility: CLINIC | Age: 84
Discharge: HOME | End: 2019-08-02
Payer: MEDICARE

## 2019-08-02 DIAGNOSIS — I50.20 SYSTOLIC HEART FAILURE, UNSPECIFIED HF CHRONICITY (HCC): ICD-10-CM

## 2019-08-02 PROCEDURE — 93306 TTE W/DOPPLER COMPLETE: CPT

## 2019-08-02 PROCEDURE — 93306 TTE W/DOPPLER COMPLETE: CPT | Performed by: INTERNAL MEDICINE

## 2019-08-05 ENCOUNTER — OFFICE VISIT (OUTPATIENT)
Dept: CARDIOLOGY CLINIC | Facility: CLINIC | Age: 84
End: 2019-08-05
Payer: MEDICARE

## 2019-08-05 VITALS
RESPIRATION RATE: 18 BRPM | SYSTOLIC BLOOD PRESSURE: 140 MMHG | HEART RATE: 70 BPM | HEIGHT: 68 IN | BODY MASS INDEX: 35.1 KG/M2 | DIASTOLIC BLOOD PRESSURE: 70 MMHG | WEIGHT: 231.6 LBS

## 2019-08-05 DIAGNOSIS — I50.32 CHRONIC DIASTOLIC CONGESTIVE HEART FAILURE (HCC): Primary | ICD-10-CM

## 2019-08-05 DIAGNOSIS — I10 ESSENTIAL HYPERTENSION: ICD-10-CM

## 2019-08-05 DIAGNOSIS — I49.3 PVC (PREMATURE VENTRICULAR CONTRACTION): ICD-10-CM

## 2019-08-05 PROCEDURE — 93000 ELECTROCARDIOGRAM COMPLETE: CPT | Performed by: INTERNAL MEDICINE

## 2019-08-05 PROCEDURE — 99204 OFFICE O/P NEW MOD 45 MIN: CPT | Performed by: INTERNAL MEDICINE

## 2019-08-05 RX ORDER — FERROUS SULFATE 325(65) MG
325 TABLET ORAL
COMMUNITY
End: 2022-04-28 | Stop reason: SDUPTHER

## 2019-08-05 RX ORDER — MELATONIN
1000 DAILY
COMMUNITY
End: 2022-04-28 | Stop reason: SDUPTHER

## 2019-08-05 NOTE — ASSESSMENT & PLAN NOTE
Wt Readings from Last 3 Encounters:   08/05/19 105 kg (231 lb 9 6 oz)   03/05/18 109 kg (241 lb)   06/14/17 94 8 kg (208 lb 15 9 oz)     Chronic diastolic congestive heart failure, stable  Patient denies any symptoms of dyspnea but has mild leg edema  He also has evidence of renal insufficiency  The patient will continue furosemide at the current rate of 40 mg daily

## 2019-08-05 NOTE — LETTER
August 5, 2019     Stefanie Cline, [address]  [address] [place] [address]    Patient: Cheyenne Lebron   YOB: 1931   Date of Visit: 8/5/2019       Dear Dr Connelly Alpha:    Thank you for referring Cheyenne Lebron to me for evaluation  Below are my notes for this consultation  If you have questions, please do not hesitate to call me  I look forward to following your patient along with you  Sincerely,        Tana Brock MD        CC: No Recipients  Tana Brock MD  8/5/2019 10:42 AM  Sign at close encounter  Assessment/Plan:    Hypertension  Hypertension, stable and adequately controlled  We will continue present medications  Chronic diastolic congestive heart failure (HCC)  Wt Readings from Last 3 Encounters:   08/05/19 105 kg (231 lb 9 6 oz)   03/05/18 109 kg (241 lb)   06/14/17 94 8 kg (208 lb 15 9 oz)     Chronic diastolic congestive heart failure, stable  Patient denies any symptoms of dyspnea but has mild leg edema  He also has evidence of renal insufficiency  The patient will continue furosemide at the current rate of 40 mg daily  PVC (premature ventricular contraction)  Patient has abnormal EKG with a pattern of 1st degree AV block and frequent premature ventricular contractions occurring in a bigeminal fashion  Patient is totally asymptomatic in this regard  He has not experienced any syncopal episodes  Patient had a recent echocardiogram showing adequate left ventricular systolic function  I will arrange for 24 hour Holter  Diagnoses and all orders for this visit:    Chronic diastolic congestive heart failure (HCC)  -     POCT ECG  -     Holter monitor - 24 hour; Future    Essential hypertension    PVC (premature ventricular contraction)  -     Holter monitor - 24 hour; Future    Other orders  -     cholecalciferol (VITAMIN D3) 1,000 units tablet; Take 1,000 Units by mouth daily  -     ferrous sulfate 325 (65 Fe) mg tablet;  Take 325 mg by mouth daily with breakfast          Subjective: Follow feels tired at times  Patient ID: Krissy Corrales is a 80 y o  male  The patient presented to this office for the purpose of cardiac evaluation and consultation  He was noted to have a slow heart rate  He has been treated for hypertension and diastolic congestive heart failure  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  During that time he feels tired but he denies any symptoms of excessive dyspnea or exertion, chest pain, palpitation, dizziness or lightheadedness  He has never experienced a syncopal episode  He has been noted to have leg swelling  He is also known to have renal insufficiency  The following portions of the patient's history were reviewed and updated as appropriate: allergies, current medications, past family history, past medical history, past social history, past surgical history and problem list     Review of Systems   Constitutional: Positive for fatigue  Respiratory: Negative for apnea, cough, chest tightness, shortness of breath and wheezing  Cardiovascular: Positive for leg swelling  Negative for chest pain and palpitations  Gastrointestinal: Negative for abdominal pain  Neurological: Negative for dizziness and light-headedness  Objective:  Stable cardiac-wise  /70 (BP Location: Left arm, Patient Position: Sitting)   Pulse 70 Comment: Irregular  Resp 18   Ht 5' 8" (1 727 m)   Wt 105 kg (231 lb 9 6 oz)   BMI 35 21 kg/m²           Physical Exam   Constitutional: He is oriented to person, place, and time  He appears well-developed and well-nourished  No distress  HENT:   Head: Normocephalic  Eyes: Pupils are equal, round, and reactive to light  Neck: Normal range of motion  No JVD present  No thyromegaly present  Cardiovascular: Normal rate, S1 normal and S2 normal  An irregular rhythm present  Exam reveals no gallop and no friction rub  Murmur heard     Crescendo systolic murmur is present with a grade of 2/6  Pulmonary/Chest: Effort normal and breath sounds normal  No respiratory distress  He has no wheezes  He has no rales  He exhibits no tenderness  Abdominal: Soft  Musculoskeletal: Normal range of motion  He exhibits no edema, tenderness or deformity  Neurological: He is alert and oriented to person, place, and time  Skin: Skin is warm and dry  He is not diaphoretic  Psychiatric: He has a normal mood and affect  Vitals reviewed

## 2019-08-05 NOTE — ASSESSMENT & PLAN NOTE
Patient has abnormal EKG with a pattern of 1st degree AV block and frequent premature ventricular contractions occurring in a bigeminal fashion  Patient is totally asymptomatic in this regard  He has not experienced any syncopal episodes  Patient had a recent echocardiogram showing adequate left ventricular systolic function  I will arrange for 24 hour Holter

## 2019-08-05 NOTE — PROGRESS NOTES
Assessment/Plan:    Hypertension  Hypertension, stable and adequately controlled  We will continue present medications  Chronic diastolic congestive heart failure (HCC)  Wt Readings from Last 3 Encounters:   08/05/19 105 kg (231 lb 9 6 oz)   03/05/18 109 kg (241 lb)   06/14/17 94 8 kg (208 lb 15 9 oz)     Chronic diastolic congestive heart failure, stable  Patient denies any symptoms of dyspnea but has mild leg edema  He also has evidence of renal insufficiency  The patient will continue furosemide at the current rate of 40 mg daily  PVC (premature ventricular contraction)  Patient has abnormal EKG with a pattern of 1st degree AV block and frequent premature ventricular contractions occurring in a bigeminal fashion  Patient is totally asymptomatic in this regard  He has not experienced any syncopal episodes  Patient had a recent echocardiogram showing adequate left ventricular systolic function  I will arrange for 24 hour Holter  Diagnoses and all orders for this visit:    Chronic diastolic congestive heart failure (HCC)  -     POCT ECG  -     Holter monitor - 24 hour; Future    Essential hypertension    PVC (premature ventricular contraction)  -     Holter monitor - 24 hour; Future    Other orders  -     cholecalciferol (VITAMIN D3) 1,000 units tablet; Take 1,000 Units by mouth daily  -     ferrous sulfate 325 (65 Fe) mg tablet; Take 325 mg by mouth daily with breakfast          Subjective: Follow feels tired at times  Patient ID: Liberty Breen is a 80 y o  male  The patient presented to this office for the purpose of cardiac evaluation and consultation  He was noted to have a slow heart rate  He has been treated for hypertension and diastolic congestive heart failure  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  During that time he feels tired but he denies any symptoms of excessive dyspnea or exertion, chest pain, palpitation, dizziness or lightheadedness    He has never experienced a syncopal episode  He has been noted to have leg swelling  He is also known to have renal insufficiency  The following portions of the patient's history were reviewed and updated as appropriate: allergies, current medications, past family history, past medical history, past social history, past surgical history and problem list     Review of Systems   Constitutional: Positive for fatigue  Respiratory: Negative for apnea, cough, chest tightness, shortness of breath and wheezing  Cardiovascular: Positive for leg swelling  Negative for chest pain and palpitations  Gastrointestinal: Negative for abdominal pain  Neurological: Negative for dizziness and light-headedness  Objective:  Stable cardiac-wise  /70 (BP Location: Left arm, Patient Position: Sitting)   Pulse 70 Comment: Irregular  Resp 18   Ht 5' 8" (1 727 m)   Wt 105 kg (231 lb 9 6 oz)   BMI 35 21 kg/m²          Physical Exam   Constitutional: He is oriented to person, place, and time  He appears well-developed and well-nourished  No distress  HENT:   Head: Normocephalic  Eyes: Pupils are equal, round, and reactive to light  Neck: Normal range of motion  No JVD present  No thyromegaly present  Cardiovascular: Normal rate, S1 normal and S2 normal  An irregular rhythm present  Exam reveals no gallop and no friction rub  Murmur heard  Crescendo systolic murmur is present with a grade of 2/6  Pulmonary/Chest: Effort normal and breath sounds normal  No respiratory distress  He has no wheezes  He has no rales  He exhibits no tenderness  Abdominal: Soft  Musculoskeletal: Normal range of motion  He exhibits no edema, tenderness or deformity  Neurological: He is alert and oriented to person, place, and time  Skin: Skin is warm and dry  He is not diaphoretic  Psychiatric: He has a normal mood and affect  Vitals reviewed

## 2019-08-08 ENCOUNTER — HOSPITAL ENCOUNTER (OUTPATIENT)
Dept: NON INVASIVE DIAGNOSTICS | Facility: CLINIC | Age: 84
Discharge: HOME | End: 2019-08-08
Payer: MEDICARE

## 2019-08-08 DIAGNOSIS — I49.3 PVC (PREMATURE VENTRICULAR CONTRACTION): ICD-10-CM

## 2019-08-08 DIAGNOSIS — I50.32 CHRONIC DIASTOLIC CONGESTIVE HEART FAILURE (HCC): ICD-10-CM

## 2019-08-08 PROCEDURE — 93226 XTRNL ECG REC<48 HR SCAN A/R: CPT

## 2019-08-08 PROCEDURE — 93225 XTRNL ECG REC<48 HRS REC: CPT

## 2019-08-15 PROCEDURE — 93227 XTRNL ECG REC<48 HR R&I: CPT | Performed by: INTERNAL MEDICINE

## 2019-09-10 ENCOUNTER — TRANSCRIBE ORDERS (OUTPATIENT)
Dept: LAB | Facility: HOSPITAL | Age: 84
End: 2019-09-10

## 2019-09-10 ENCOUNTER — APPOINTMENT (OUTPATIENT)
Dept: LAB | Facility: HOSPITAL | Age: 84
End: 2019-09-10
Attending: INTERNAL MEDICINE
Payer: MEDICARE

## 2019-09-10 DIAGNOSIS — N18.9 CHRONIC KIDNEY DISEASE, UNSPECIFIED CKD STAGE: ICD-10-CM

## 2019-09-10 DIAGNOSIS — M10.341: Primary | ICD-10-CM

## 2019-09-10 DIAGNOSIS — M10.341: ICD-10-CM

## 2019-09-10 LAB
ANION GAP SERPL CALCULATED.3IONS-SCNC: 2 MMOL/L (ref 4–13)
BUN SERPL-MCNC: 26 MG/DL (ref 5–25)
CALCIUM SERPL-MCNC: 8.9 MG/DL (ref 8.3–10.1)
CHLORIDE SERPL-SCNC: 104 MMOL/L (ref 100–108)
CO2 SERPL-SCNC: 30 MMOL/L (ref 21–32)
CREAT SERPL-MCNC: 1.83 MG/DL (ref 0.6–1.3)
GFR SERPL CREATININE-BSD FRML MDRD: 32 ML/MIN/1.73SQ M
GLUCOSE P FAST SERPL-MCNC: 92 MG/DL (ref 65–99)
POTASSIUM SERPL-SCNC: 4.6 MMOL/L (ref 3.5–5.3)
SODIUM SERPL-SCNC: 136 MMOL/L (ref 136–145)
URATE SERPL-MCNC: 9.8 MG/DL (ref 4.2–8)

## 2019-09-10 PROCEDURE — 84550 ASSAY OF BLOOD/URIC ACID: CPT

## 2019-09-10 PROCEDURE — 36415 COLL VENOUS BLD VENIPUNCTURE: CPT

## 2019-09-10 PROCEDURE — 80048 BASIC METABOLIC PNL TOTAL CA: CPT

## 2020-06-06 ENCOUNTER — HOSPITAL ENCOUNTER (EMERGENCY)
Facility: HOSPITAL | Age: 85
Discharge: HOME | End: 2020-06-06
Attending: EMERGENCY MEDICINE | Admitting: EMERGENCY MEDICINE
Payer: MEDICARE

## 2020-06-06 VITALS
HEART RATE: 94 BPM | RESPIRATION RATE: 20 BRPM | OXYGEN SATURATION: 97 % | SYSTOLIC BLOOD PRESSURE: 166 MMHG | DIASTOLIC BLOOD PRESSURE: 89 MMHG | TEMPERATURE: 99 F

## 2020-06-06 DIAGNOSIS — S61.402A AVULSION OF SKIN OF LEFT HAND, INITIAL ENCOUNTER: Primary | ICD-10-CM

## 2020-06-06 PROCEDURE — 90471 IMMUNIZATION ADMIN: CPT

## 2020-06-06 PROCEDURE — 90715 TDAP VACCINE 7 YRS/> IM: CPT | Performed by: EMERGENCY MEDICINE

## 2020-06-06 PROCEDURE — 99282 EMERGENCY DEPT VISIT SF MDM: CPT | Performed by: EMERGENCY MEDICINE

## 2020-06-06 PROCEDURE — 99283 EMERGENCY DEPT VISIT LOW MDM: CPT

## 2020-06-06 RX ADMIN — TETANUS TOXOID, REDUCED DIPHTHERIA TOXOID AND ACELLULAR PERTUSSIS VACCINE, ADSORBED 0.5 ML: 5; 2.5; 8; 8; 2.5 SUSPENSION INTRAMUSCULAR at 17:31

## 2020-07-24 ENCOUNTER — HOSPITAL ENCOUNTER (EMERGENCY)
Facility: HOSPITAL | Age: 85
Discharge: HOME | End: 2020-07-24
Attending: EMERGENCY MEDICINE | Admitting: EMERGENCY MEDICINE
Payer: MEDICARE

## 2020-07-24 VITALS
OXYGEN SATURATION: 97 % | WEIGHT: 231 LBS | HEART RATE: 91 BPM | DIASTOLIC BLOOD PRESSURE: 81 MMHG | RESPIRATION RATE: 20 BRPM | SYSTOLIC BLOOD PRESSURE: 115 MMHG | BODY MASS INDEX: 35.12 KG/M2 | TEMPERATURE: 99.1 F

## 2020-07-24 DIAGNOSIS — S61.011A LACERATION OF RIGHT THUMB: Primary | ICD-10-CM

## 2020-07-24 PROCEDURE — 99282 EMERGENCY DEPT VISIT SF MDM: CPT | Performed by: PHYSICIAN ASSISTANT

## 2020-07-24 PROCEDURE — 12001 RPR S/N/AX/GEN/TRNK 2.5CM/<: CPT | Performed by: PHYSICIAN ASSISTANT

## 2020-07-24 PROCEDURE — 99282 EMERGENCY DEPT VISIT SF MDM: CPT

## 2020-07-24 RX ORDER — LIDOCAINE HYDROCHLORIDE 10 MG/ML
10 INJECTION, SOLUTION EPIDURAL; INFILTRATION; INTRACAUDAL; PERINEURAL ONCE
Status: COMPLETED | OUTPATIENT
Start: 2020-07-24 | End: 2020-07-24

## 2020-07-24 RX ORDER — IBUPROFEN 200 MG
TABLET ORAL 3 TIMES DAILY
Qty: 28.3 G | Refills: 0 | Status: SHIPPED | OUTPATIENT
Start: 2020-07-24 | End: 2020-07-29

## 2020-07-24 RX ADMIN — LIDOCAINE HYDROCHLORIDE 10 ML: 10 INJECTION, SOLUTION EPIDURAL; INFILTRATION; INTRACAUDAL; PERINEURAL at 13:30

## 2020-07-24 NOTE — ED PROVIDER NOTES
History  Chief Complaint   Patient presents with    Finger Laceration     pt cut his right thumb on table saw  bleeding is controlled at this time  pt in on a blood thinner but he doesnt recall what it is, "its in that computer you got", up to date on tetanus      Mo Diana is an 79 yo M presenting with laceration to R thumb sustained on table saw at 11:30 am today  Reports he accidentally cut his R thumb with the blade  Reports bleeding is controlled prior to arrival with pressure  Tetanus is up to date  Denies numbness, tingling, or weakness in thumb/hand  History provided by:  Patient   used: No    Finger Laceration   Location:  Finger  Finger laceration location:  R thumb  Depth: Through dermis  Quality: jagged    Bleeding: controlled    Time since incident:  3 hours  Injury mechanism: saw blade  Relieved by:  Pressure  Tetanus status:  Up to date  Associated symptoms: no fever, no focal weakness, no numbness, no rash, no redness, no swelling and no streaking        Prior to Admission Medications   Prescriptions Last Dose Informant Patient Reported? Taking?    amLODIPine (NORVASC) 5 mg tablet   Yes No   Sig: Take 5 mg by mouth daily   cholecalciferol (VITAMIN D3) 1,000 units tablet   Yes No   Sig: Take 1,000 Units by mouth daily   ferrous sulfate 325 (65 Fe) mg tablet   Yes No   Sig: Take 325 mg by mouth daily with breakfast   furosemide (LASIX) 40 mg tablet   No No   Sig: Take 1 tablet by mouth daily   levothyroxine 88 mcg tablet   No No   Sig: Take 1 tablet by mouth daily in the early morning   Patient taking differently: Take 100 mcg by mouth daily in the early morning     metoprolol succinate (TOPROL-XL) 25 mg 24 hr tablet   Yes No   Sig: Take 25 mg by mouth daily      Facility-Administered Medications: None       Past Medical History:   Diagnosis Date    [facility]) ([facility] [address] )     [facility]congestive heart failure) ([facility])     CKD (chronic kidney disease) stage 3, GFR 30-59 ml/min ([facility] [address] )     Hypertension     Sepsis ([facility] )     Venous stasis        Past Surgical History:   Procedure Laterality Date    PROSTATE SURGERY         No family history on file  I have reviewed and agree with the history as documented  E-Cigarette/Vaping     E-Cigarette/Vaping Substances     Social History     Tobacco Use    Smoking status: Never Smoker    Smokeless tobacco: Never Used   Substance Use Topics    Alcohol use: No    Drug use: No       Review of Systems   Constitutional: Negative for chills and fever  HENT: Negative for congestion, rhinorrhea and sore throat  Eyes: Negative for pain and visual disturbance  Respiratory: Negative for cough, shortness of breath and wheezing  Cardiovascular: Negative for chest pain and palpitations  Gastrointestinal: Negative for abdominal pain, nausea and vomiting  Genitourinary: Negative for dysuria, frequency and urgency  Musculoskeletal: Negative for back pain, neck pain and neck stiffness  Skin: Positive for wound  Negative for rash  Neurological: Negative for dizziness, focal weakness, weakness, light-headedness and numbness  Physical Exam  Physical Exam   Constitutional: He is oriented to person, place, and time  He appears well-developed and well-nourished  No distress  HENT:   Head: Normocephalic and atraumatic  Right Ear: External ear normal    Left Ear: External ear normal    Mouth/Throat: Oropharynx is clear and moist    Eyes: Pupils are equal, round, and reactive to light  Conjunctivae and EOM are normal    Neck: Normal range of motion  Neck supple  Cardiovascular: Normal rate, regular rhythm, normal heart sounds and intact distal pulses  Exam reveals no gallop and no friction rub  No murmur heard  Pulmonary/Chest: Effort normal and breath sounds normal  No respiratory distress  He has no wheezes  Abdominal: Soft  He exhibits no distension  There is no tenderness     Musculoskeletal:   Intact strength in flexion/extension R thumb  Brisk cap refill to R thumb  Intact sensation to R hand  Lymphadenopathy:     He has no cervical adenopathy  Neurological: He is alert and oriented to person, place, and time  He exhibits normal muscle tone  Coordination normal    Skin: Skin is warm and dry  Capillary refill takes less than 2 seconds  Laceration noted  No rash noted  He is not diaphoretic  No erythema  Psychiatric: He has a normal mood and affect  His behavior is normal  Judgment and thought content normal        Vital Signs  ED Triage Vitals [07/24/20 1256]   Temperature Pulse Respirations Blood Pressure SpO2   99 1 °F (37 3 °C) 91 20 115/81 97 %      Temp Source Heart Rate Source Patient Position - Orthostatic VS BP Location FiO2 (%)   Oral -- -- -- --      Pain Score       --           Vitals:    07/24/20 1256   BP: 115/81   Pulse: 91         Visual Acuity      ED Medications  Medications   lidocaine (PF) (XYLOCAINE-MPF) 1 % injection 10 mL (10 mL Infiltration Given 7/24/20 1330)       Diagnostic Studies  Results Reviewed     None                 No orders to display              Procedures  Laceration repair  Date/Time: 7/24/2020 4:19 PM  Performed by: Camilla Goodpasture, PA-C  Authorized by: Camilla Goodpasture, PA-C   Consent: Verbal consent obtained    Risks and benefits: risks, benefits and alternatives were discussed  Consent given by: patient  Patient understanding: patient states understanding of the procedure being performed  Patient consent: the patient's understanding of the procedure matches consent given  Required items: required blood products, implants, devices, and special equipment available  Patient identity confirmed: verbally with patient  Body area: upper extremity  Location details: right thumb  Laceration length: 2 5 cm  Foreign bodies: no foreign bodies  Tendon involvement: none  Nerve involvement: none  Vascular damage: no  Anesthesia: local infiltration    Anesthesia:  Local Anesthetic: lidocaine 1% without epinephrine  Anesthetic total: 3 mL      Procedure Details:  Preparation: Patient was prepped and draped in the usual sterile fashion  Irrigation solution: saline  Irrigation method: syringe  Amount of cleaning: standard  Debridement: none  Degree of undermining: none  Skin closure: 5-0 nylon  Number of sutures: 5  Technique: simple  Approximation: close  Approximation difficulty: simple  Dressing: non-adhesive packing strip and gauze roll  Patient tolerance: Patient tolerated the procedure well with no immediate complications               ED Course               Identification of Seniors at Risk      Most Recent Value   (ISAR) Identification of Seniors at Risk   Before the illness or injury that brought you to the Emergency, did you need someone to help you on a regular basis? 0 Filed at: 07/24/2020 1258   In the last 24 hours, have you needed more help than usual?  0 Filed at: 07/24/2020 1258   Have you been hospitalized for one or more nights during the past 6 months? 0 Filed at: 07/24/2020 1258   In general, do you see well?  0 Filed at: 07/24/2020 1258   In general, do you have serious problems with your memory? 0 Filed at: 07/24/2020 1258   Do you take more than three different medications every day? 1 Filed at: 07/24/2020 1258   ISAR Score  1 Filed at: 07/24/2020 1258                                    [facility]  Number of Diagnoses or Management Options  Laceration of right thumb:   Diagnosis management comments: Laceration to R thumb sustained 3 hours prior to arrival on metal saw blade  Intact range of motion, cap refill, and sensation to finger/R hand  Wound repaired with 5 5-0 nylon sutures  Tetanus UTD  At home wound care and removal times/instructions discussed with pt  Return indications discussed       Patient Progress  Patient progress: improved        Disposition  Final diagnoses:   Laceration of right thumb     Time reflects when diagnosis was documented in both MDM as applicable and the Disposition within this note     Time User Action Codes Description Comment    7/24/2020  2:12 PM Crystal Glover Add [[id number]] Laceration of right thumb       ED Disposition     ED Disposition Condition Date/Time Comment    Discharge Stable Fri Jul 24, 2020  2:12 PM [place] discharge to home/self care  Follow-up Information     Follow up With Specialties Details Why Contact Info Additional Information    [facility]  Urgent Care  For suture removal in 10-12 days  [address], [address], [place], [address] [facility]          Discharge Medication List as of 7/24/2020  2:14 PM      START taking these medications    Details   neomycin-bacitracin-polymyxin (NEOSPORIN) 5-400-5,000 ointment Apply topically 3 (three) times a day for 5 days, Starting Fri 7/24/2020, Until Wed 7/29/2020, Normal         CONTINUE these medications which have NOT CHANGED    Details   amLODIPine (NORVASC) 5 mg tablet Take 5 mg by mouth daily, Historical Med      cholecalciferol (VITAMIN D3) 1,000 units tablet Take 1,000 Units by mouth daily, Historical Med      ferrous sulfate 325 (65 Fe) mg tablet Take 325 mg by mouth daily with breakfast, Historical Med      furosemide (LASIX) 40 mg tablet Take 1 tablet by mouth daily, Starting Thu 6/15/2017, Print      levothyroxine 88 mcg tablet Take 1 tablet by mouth daily in the early morning, Starting Thu 6/15/2017, Print      metoprolol succinate (TOPROL-XL) 25 mg 24 hr tablet Take 25 mg by mouth daily, Historical Med           No discharge procedures on file      PDMP Review     None          ED Provider  Electronically Signed by           Jeovanny Mead PA-C  07/24/20 0027

## 2020-07-24 NOTE — DISCHARGE INSTRUCTIONS
Please refer to the attached information for strict return instructions  If symptoms worsen or new symptoms develop please return to the ER  Follow up in urgent care in 10-12 days for suture removal  Apply topical neosporin three times daily to prevent wound infection  Rinse laceration area under room temperature water 2-3 times daily to keep clean  Keep skin covered until wound closes

## 2020-08-07 ENCOUNTER — OFFICE VISIT (OUTPATIENT)
Dept: URGENT CARE | Age: 85
End: 2020-08-07
Payer: MEDICARE

## 2020-08-07 VITALS
RESPIRATION RATE: 20 BRPM | DIASTOLIC BLOOD PRESSURE: 76 MMHG | TEMPERATURE: 98.1 F | HEART RATE: 92 BPM | SYSTOLIC BLOOD PRESSURE: 150 MMHG | OXYGEN SATURATION: 98 %

## 2020-08-07 DIAGNOSIS — Z48.02 ENCOUNTER FOR REMOVAL OF SUTURES: Primary | ICD-10-CM

## 2020-08-07 PROCEDURE — G0463 HOSPITAL OUTPT CLINIC VISIT: HCPCS | Performed by: PHYSICIAN ASSISTANT

## 2020-08-07 PROCEDURE — 99213 OFFICE O/P EST LOW 20 MIN: CPT | Performed by: PHYSICIAN ASSISTANT

## 2020-08-07 NOTE — PROGRESS NOTES
[address]        NAME: Juan J Woods is a 80 y o  male  : 6/3/1931    MRN: [id number]  DATE: 2020  TIME: 12:56 PM    Assessment and Plan   Encounter for removal of sutures [Z48 02]  1  Encounter for removal of sutures           Patient Instructions     Sutures were removed today  Continue to keep clean and check for signs of infection daily  Go to the nearest ER for evaluation if any fevers, wound reopening, redness, warmth, discharge, streaking redness, redness that is circumferential, bleeding, pain, signs of infection or other concerning symptoms  Chief Complaint     Chief Complaint   Patient presents with    Suture / Staple Removal     removal of sutures from right thumb  denies s/s infection  History of Present Illness       60-year-old male presents for suture removal   States on , 13 days ago, he had 5 sutures placed to his right thumb  States he has been cleaning daily and applying topical antibiotic ointment  Denies any current complaints or concerns  States he has healed well  Denies any fevers, redness, warmth, drainage, pain, swelling, other signs infection, numbness, tingling, weakness or other complaints  States he is only here for suture removal       Review of Systems   Review of Systems   Constitutional: Negative for activity change, appetite change, fatigue and fever  Respiratory: Negative for shortness of breath  Cardiovascular: Negative for chest pain  Gastrointestinal: Negative for abdominal pain, nausea and vomiting  Musculoskeletal: Negative for arthralgias, back pain, joint swelling, neck pain and neck stiffness  Skin: Negative for rash and wound  Five sutures intact   Neurological: Negative for weakness, numbness and headaches  All other systems reviewed and are negative          Current Medications       Current Outpatient Medications:     amLODIPine (NORVASC) 5 mg tablet, Take 5 mg by mouth daily, Disp: , Rfl:    cholecalciferol (VITAMIN D3) 1,000 units tablet, Take 1,000 Units by mouth daily, Disp: , Rfl:     ferrous sulfate 325 (65 Fe) mg tablet, Take 325 mg by mouth daily with breakfast, Disp: , Rfl:     furosemide (LASIX) 40 mg tablet, Take 1 tablet by mouth daily, Disp: 30 tablet, Rfl: 0    levothyroxine 88 mcg tablet, Take 1 tablet by mouth daily in the early morning (Patient taking differently: Take 100 mcg by mouth daily in the early morning  ), Disp: 30 tablet, Rfl: 0    metoprolol succinate (TOPROL-XL) 25 mg 24 hr tablet, Take 25 mg by mouth daily, Disp: , Rfl:     neomycin-bacitracin-polymyxin (NEOSPORIN) 5-400-5,000 ointment, Apply topically 3 (three) times a day for 5 days, Disp: 28 3 g, Rfl: 0    Current Allergies     Allergies as of 08/07/2020    (No Known Allergies)            The following portions of the patient's history were reviewed and updated as appropriate: allergies, current medications, past family history, past medical history, past social history, past surgical history and problem list      Past Medical History:   Diagnosis Date    [facility] [address] )     [facility]     CKD (chronic kidney disease) stage 3, GFR 30-59 ml/min ([facility])     Hypertension     [facility]     Venous stasis        Past Surgical History:   Procedure Laterality Date    PROSTATE SURGERY         History reviewed  No pertinent family history  Medications have been verified  Objective   /76   Pulse 92   Temp 98 1 °F (36 7 °C) (Temporal)   Resp 20   SpO2 98%        Physical Exam     Physical Exam  Vitals signs and nursing note reviewed  Constitutional:       General: He is not in acute distress  Appearance: He is well-developed  Neck:      Musculoskeletal: Normal range of motion and neck supple  Cardiovascular:      Rate and Rhythm: Normal rate and regular rhythm  Heart sounds: Normal heart sounds     Pulmonary:      Effort: Pulmonary effort is normal       Breath sounds: Normal breath sounds  No wheezing  Musculoskeletal:      Right hand: He exhibits normal range of motion, no tenderness, no bony tenderness, normal capillary refill, no deformity and no swelling  Normal sensation noted  Normal strength noted  Hands:       Comments: DIP/PIP flexion tendons intact  Full extension of the fingers  5/5  strength   Skin:     Capillary Refill: Capillary refill takes less than 2 seconds  Neurological:      Mental Status: He is alert and oriented to person, place, and time  Comments: NV intact with sensation and strong peripheral pulses  5/5 strength of UE bilaterally   Psychiatric:         Behavior: Behavior normal          Suture removal    Date/Time: 8/7/2020 12:50 PM  Performed by: Sintia Moy PA-C  Authorized by: Sintia Moy PA-C     Patient location:  Bedside  Other Assisting Provider: No    Consent:     Consent obtained:  Verbal    Consent given by:  Patient    Risks discussed:  Bleeding, pain and wound separation    Alternatives discussed:  No treatment, delayed treatment, alternative treatment, observation and referral  Universal protocol:     Procedure explained and questions answered to patient or proxy's satisfaction: yes      Patient identity confirmed:  Verbally with patient  Location:     Laterality:  Right    Location:  Upper extremity    Upper extremity location:  Hand    Hand location:  R thumb  Procedure details: Tools used:  Suture removal kit    Wound appearance:  No sign(s) of infection, good wound healing and clean    Number of sutures removed:  5  Post-procedure details:     Post-removal:  No dressing applied (Patient declined need for dressing)    Patient tolerance of procedure: Tolerated well, no immediate complications  Comments:      Neurovascularly intact post suture removal    Wound remained intact and well-approximated    No bleeding, wound separation/dehiscence or other complications

## 2020-08-07 NOTE — PATIENT INSTRUCTIONS
Sutures were removed today  Continue to keep clean and check for signs of infection daily  Go to the nearest ER for evaluation if any fevers, wound reopening, redness, warmth, discharge, streaking redness, redness that is circumferential, bleeding, pain, signs of infection or other concerning symptoms  Stitches Removal   WHAT YOU NEED TO KNOW:   Stitches may need to be removed in 3 to 14 days depending on the location of your wound  Your healthcare provider will use sterile forceps or tweezers to  the knot of each stitch  He will cut the stitch with scissors and pull the stitch out  You may feel a slight tug as the stitch comes out  He may place small steristrips across your wound after the stitches have been removed  These pieces of tape will peel and fall of on their own  Do not pull them off  DISCHARGE INSTRUCTIONS:   Return to the emergency department if:   · Your wound splits open  · You suddenly cannot move your injured joint  · You have sudden numbness around your wound  · You see red streaks coming from your wound  Contact your healthcare provider if:   · You have a fever and chills  · Your wound is red, warm, swollen, or leaking pus  · There is a bad smell coming from your wound  · You have increased pain in the wound area  · You have questions or concerns about your condition or care  Care for your wound:   · Clean your wound as directed  Carefully wash your wound with soap and water  Pat the area dry with a clean towel  · Protect your wound  Your wound can swell, bleed, or split open if it is stretched or bumped  You may need to wear a bandage that supports your wound until it is completely healed  · Minimize your scar  Use sunblock if your wound is exposed to the sun  Apply it every day after the stitches are removed  This will help prevent skin discoloration  Follow up with your healthcare provider as directed:   You may need to return in 3 to 5 days if the stitches are on your face  Stitches on your scalp need to be removed after 7 to 14 days  Stitches over joints may remain in place up to 14 days  Write down your questions so you remember to ask them during your visits  © 2017 [address] [employer] Information is for End User's use only and may not be sold, redistributed or otherwise used for commercial purposes  All illustrations and images included in CareNotes® are the copyrighted property of [employer]  or [employer]  The above information is an  only  It is not intended as medical advice for individual conditions or treatments  Talk to your doctor, nurse or pharmacist before following any medical regimen to see if it is safe and effective for you

## 2020-09-28 DIAGNOSIS — I50.32 CHRONIC DIASTOLIC CONGESTIVE HEART FAILURE (HCC): ICD-10-CM

## 2020-09-28 DIAGNOSIS — I10 ESSENTIAL HYPERTENSION: Primary | ICD-10-CM

## 2020-09-28 DIAGNOSIS — E03.9 ACQUIRED HYPOTHYROIDISM: ICD-10-CM

## 2020-09-28 RX ORDER — FUROSEMIDE 40 MG/1
TABLET ORAL
Qty: 30 TABLET | Refills: 0 | Status: SHIPPED | OUTPATIENT
Start: 2020-09-28 | End: 2020-10-24

## 2020-09-28 RX ORDER — LEVOTHYROXINE SODIUM 0.1 MG/1
TABLET ORAL
Qty: 30 TABLET | Refills: 2 | Status: SHIPPED | OUTPATIENT
Start: 2020-09-28 | End: 2020-12-22

## 2020-09-28 RX ORDER — AMLODIPINE BESYLATE 5 MG/1
TABLET ORAL
Qty: 30 TABLET | Refills: 2 | Status: SHIPPED | OUTPATIENT
Start: 2020-09-28 | End: 2020-12-22

## 2020-10-24 DIAGNOSIS — I50.32 CHRONIC DIASTOLIC CONGESTIVE HEART FAILURE (HCC): ICD-10-CM

## 2020-10-24 DIAGNOSIS — M1A.9XX0 CHRONIC GOUT WITHOUT TOPHUS, UNSPECIFIED CAUSE, UNSPECIFIED SITE: Primary | ICD-10-CM

## 2020-10-24 RX ORDER — METOPROLOL SUCCINATE 25 MG/1
TABLET, EXTENDED RELEASE ORAL
Qty: 30 TABLET | Refills: 1 | Status: SHIPPED | OUTPATIENT
Start: 2020-10-24 | End: 2020-12-22

## 2020-10-24 RX ORDER — FUROSEMIDE 40 MG/1
TABLET ORAL
Qty: 30 TABLET | Refills: 0 | Status: SHIPPED | OUTPATIENT
Start: 2020-10-24 | End: 2020-11-27

## 2020-10-24 RX ORDER — ALLOPURINOL 100 MG/1
TABLET ORAL
Qty: 60 TABLET | Refills: 1 | Status: SHIPPED | OUTPATIENT
Start: 2020-10-24 | End: 2020-12-22

## 2020-11-27 DIAGNOSIS — I50.32 CHRONIC DIASTOLIC CONGESTIVE HEART FAILURE (HCC): ICD-10-CM

## 2020-11-27 RX ORDER — FUROSEMIDE 40 MG/1
TABLET ORAL
Qty: 30 TABLET | Refills: 1 | Status: SHIPPED | OUTPATIENT
Start: 2020-11-27 | End: 2021-01-19

## 2020-12-22 DIAGNOSIS — I10 ESSENTIAL HYPERTENSION: ICD-10-CM

## 2020-12-22 DIAGNOSIS — M1A.9XX0 CHRONIC GOUT WITHOUT TOPHUS, UNSPECIFIED CAUSE, UNSPECIFIED SITE: ICD-10-CM

## 2020-12-22 DIAGNOSIS — E03.9 ACQUIRED HYPOTHYROIDISM: ICD-10-CM

## 2020-12-22 DIAGNOSIS — I50.32 CHRONIC DIASTOLIC CONGESTIVE HEART FAILURE (HCC): ICD-10-CM

## 2020-12-22 RX ORDER — AMLODIPINE BESYLATE 5 MG/1
TABLET ORAL
Qty: 30 TABLET | Refills: 2 | Status: SHIPPED | OUTPATIENT
Start: 2020-12-22 | End: 2021-03-14

## 2020-12-22 RX ORDER — LEVOTHYROXINE SODIUM 0.1 MG/1
TABLET ORAL
Qty: 30 TABLET | Refills: 2 | Status: SHIPPED | OUTPATIENT
Start: 2020-12-22 | End: 2021-03-14

## 2020-12-22 RX ORDER — METOPROLOL SUCCINATE 25 MG/1
TABLET, EXTENDED RELEASE ORAL
Qty: 30 TABLET | Refills: 1 | Status: SHIPPED | OUTPATIENT
Start: 2020-12-22 | End: 2021-02-16

## 2020-12-22 RX ORDER — ALLOPURINOL 100 MG/1
TABLET ORAL
Qty: 60 TABLET | Refills: 1 | Status: SHIPPED | OUTPATIENT
Start: 2020-12-22 | End: 2021-07-23

## 2021-01-13 ENCOUNTER — OFFICE VISIT (OUTPATIENT)
Dept: INTERNAL MEDICINE CLINIC | Facility: CLINIC | Age: 86
End: 2021-01-13
Payer: MEDICARE

## 2021-01-13 VITALS
DIASTOLIC BLOOD PRESSURE: 70 MMHG | BODY MASS INDEX: 37.77 KG/M2 | OXYGEN SATURATION: 96 % | SYSTOLIC BLOOD PRESSURE: 107 MMHG | HEIGHT: 66 IN | HEART RATE: 100 BPM | TEMPERATURE: 97.6 F | WEIGHT: 235 LBS

## 2021-01-13 DIAGNOSIS — I50.32 CHRONIC DIASTOLIC CONGESTIVE HEART FAILURE (HCC): ICD-10-CM

## 2021-01-13 DIAGNOSIS — E03.9 ACQUIRED HYPOTHYROIDISM: Primary | ICD-10-CM

## 2021-01-13 DIAGNOSIS — I10 ESSENTIAL HYPERTENSION: ICD-10-CM

## 2021-01-13 DIAGNOSIS — M1A.9XX0 CHRONIC GOUT WITHOUT TOPHUS, UNSPECIFIED CAUSE, UNSPECIFIED SITE: ICD-10-CM

## 2021-01-13 PROCEDURE — 99214 OFFICE O/P EST MOD 30 MIN: CPT | Performed by: INTERNAL MEDICINE

## 2021-01-14 NOTE — PROGRESS NOTES
Assessment/Plan: This is 71-year-old gentleman with multiple medical problems including congestive heart failure hypothyroidism gout hypertension  He needs to have a 's license extended  His vision has been checked by his optometrist and he will be allowed to drive during daylight hours because off his visual status he         1  Acquired hypothyroidism    2  Chronic gout without tophus, unspecified cause, unspecified site    3  Chronic diastolic congestive heart failure ([address] )    4  Essential hypertension           1  Acquired hypothyroidism      2  Chronic gout without tophus, unspecified cause, unspecified site      3  Chronic diastolic congestive heart failure ([address] )      4  Essential hypertension             Subjective:      Patient ID: Yolanda Fortune is a 80 y o  male  He denies any chest pain or shortness of breath  Is taking care his wife who has Alzheimer's dementia but had in the office with him  He is unwilling to move to a senior living arrangement  The following portions of the patient's history were reviewed and updated as appropriate: He  has a past medical history of VAN (acute kidney injury) ([address] ), Atrial flutter ([address] ), CHF (congestive heart failure) ([address] ), CKD (chronic kidney disease) stage 3, GFR 30-59 ml/min, Hypertension, Obstructed, uropathy, Sepsis ([address] ), and Venous stasis  He   Patient Active Problem List    Diagnosis Date Noted    PVC (premature ventricular contraction) 08/05/2019    Chronic diastolic congestive heart failure ([address] ) 06/14/2017    Leukocytosis 06/14/2017    Venous stasis ulcer ([address] ) 06/14/2017    Stage 3 chronic kidney disease 06/14/2017    Sepsis ([address] ) 06/13/2017    Cellulitis of right lower extremity 06/13/2017    Lower extremity edema 06/13/2017    Hypertension 06/13/2017    Hypothyroid 06/13/2017     He  has a past surgical history that includes Prostate surgery; Transurethral resection of prostate; and Tonsillectomy    His family history is not on file  He  reports that he has never smoked  He has never used smokeless tobacco  He reports that he does not drink alcohol or use drugs  Current Outpatient Medications   Medication Sig Dispense Refill    allopurinol (ZYLOPRIM) 100 mg tablet TAKE 1 TAB TWICE A DAY 60 tablet 1    amLODIPine (NORVASC) 5 mg tablet TAKE 1 TAB DAILY 30 tablet 2    cholecalciferol (VITAMIN D3) 1,000 units tablet Take 1,000 Units by mouth daily      ferrous sulfate 325 (65 Fe) mg tablet Take 325 mg by mouth daily with breakfast      furosemide (LASIX) 40 mg tablet TAKE 1 TAB DAILY 30 tablet 1    levothyroxine 100 mcg tablet TAKE 1 TAB DAILY 30 tablet 2    metoprolol succinate (TOPROL-XL) 25 mg 24 hr tablet TAKE 1 TAB DAILY 30 tablet 1    neomycin-bacitracin-polymyxin (NEOSPORIN) 5-400-5,000 ointment Apply topically 3 (three) times a day for 5 days (Patient not taking: Reported on 1/13/2021) 28 3 g 0     No current facility-administered medications for this visit  Current Outpatient Medications on File Prior to Visit   Medication Sig    allopurinol (ZYLOPRIM) 100 mg tablet TAKE 1 TAB TWICE A DAY    amLODIPine (NORVASC) 5 mg tablet TAKE 1 TAB DAILY    cholecalciferol (VITAMIN D3) 1,000 units tablet Take 1,000 Units by mouth daily    ferrous sulfate 325 (65 Fe) mg tablet Take 325 mg by mouth daily with breakfast    furosemide (LASIX) 40 mg tablet TAKE 1 TAB DAILY    levothyroxine 100 mcg tablet TAKE 1 TAB DAILY    metoprolol succinate (TOPROL-XL) 25 mg 24 hr tablet TAKE 1 TAB DAILY    neomycin-bacitracin-polymyxin (NEOSPORIN) 5-400-5,000 ointment Apply topically 3 (three) times a day for 5 days (Patient not taking: Reported on 1/13/2021)     No current facility-administered medications on file prior to visit  He has No Known Allergies       Review of Systems   Constitutional: Negative for appetite change, chills, fatigue and fever  HENT: Negative for sore throat and trouble swallowing      Eyes: Negative for redness  Respiratory: Negative for shortness of breath  Cardiovascular: Negative for chest pain and palpitations  Gastrointestinal: Negative for abdominal pain, constipation and diarrhea  Genitourinary: Negative for dysuria and hematuria  Musculoskeletal: Positive for gait problem  Negative for back pain and neck pain  Skin: Negative for rash  Neurological: Negative for seizures, weakness and headaches  Hematological: Negative for adenopathy  Psychiatric/Behavioral: Negative for confusion  The patient is not nervous/anxious  Objective:      /70 (BP Location: Right arm, Patient Position: Sitting, Cuff Size: Standard)   Pulse 100   Temp 97 6 °F (36 4 °C) (Temporal)   Ht 5' 6" (1 676 m)   Wt 107 kg (235 lb)   SpO2 96%   BMI 37 93 kg/m²     No results found for this or any previous visit (from the past 1344 hour(s))  Physical Exam  Constitutional:       General: He is not in acute distress  Appearance: Normal appearance  HENT:      Head: Normocephalic and atraumatic  Right Ear: Tympanic membrane normal       Left Ear: Tympanic membrane normal       Nose: Nose normal       Mouth/Throat:      Mouth: Mucous membranes are moist    Eyes:      Extraocular Movements: Extraocular movements intact  Pupils: Pupils are equal, round, and reactive to light  Neck:      Musculoskeletal: Normal range of motion  Cardiovascular:      Rate and Rhythm: Normal rate and regular rhythm  Pulses: Normal pulses  Heart sounds: Murmur present  No friction rub  Pulmonary:      Effort: Pulmonary effort is normal  No respiratory distress  Breath sounds: Normal breath sounds  No wheezing  Abdominal:      General: Abdomen is flat  Bowel sounds are normal  There is no distension  Palpations: Abdomen is soft  There is no mass  Tenderness: There is no abdominal tenderness  There is no guarding  Musculoskeletal: Normal range of motion     Skin:     General: Skin is warm  Neurological:      General: No focal deficit present  Mental Status: He is alert and oriented to person, place, and time  Mental status is at baseline  Cranial Nerves: No cranial nerve deficit        Gait: Gait abnormal    Psychiatric:         Mood and Affect: Mood normal          Behavior: Behavior normal

## 2021-01-17 ENCOUNTER — IMMUNIZATIONS (OUTPATIENT)
Dept: FAMILY MEDICINE CLINIC | Facility: HOSPITAL | Age: 86
End: 2021-01-17

## 2021-01-17 DIAGNOSIS — Z23 ENCOUNTER FOR IMMUNIZATION: Primary | ICD-10-CM

## 2021-01-17 PROCEDURE — 91300 SARS-COV-2 / COVID-19 MRNA VACCINE (PFIZER-BIONTECH) 30 MCG: CPT

## 2021-01-17 PROCEDURE — 0001A SARS-COV-2 / COVID-19 MRNA VACCINE (PFIZER-BIONTECH) 30 MCG: CPT

## 2021-01-19 DIAGNOSIS — I50.32 CHRONIC DIASTOLIC CONGESTIVE HEART FAILURE (HCC): ICD-10-CM

## 2021-01-19 RX ORDER — FUROSEMIDE 40 MG/1
TABLET ORAL
Qty: 30 TABLET | Refills: 1 | Status: SHIPPED | OUTPATIENT
Start: 2021-01-19 | End: 2021-03-14

## 2021-02-08 ENCOUNTER — IMMUNIZATIONS (OUTPATIENT)
Dept: FAMILY MEDICINE CLINIC | Facility: HOSPITAL | Age: 86
End: 2021-02-08

## 2021-02-08 DIAGNOSIS — Z23 ENCOUNTER FOR IMMUNIZATION: Primary | ICD-10-CM

## 2021-02-08 PROCEDURE — 0002A SARS-COV-2 / COVID-19 MRNA VACCINE (PFIZER-BIONTECH) 30 MCG: CPT

## 2021-02-08 PROCEDURE — 91300 SARS-COV-2 / COVID-19 MRNA VACCINE (PFIZER-BIONTECH) 30 MCG: CPT

## 2021-02-15 DIAGNOSIS — I50.32 CHRONIC DIASTOLIC CONGESTIVE HEART FAILURE (HCC): ICD-10-CM

## 2021-02-16 RX ORDER — METOPROLOL SUCCINATE 25 MG/1
TABLET, EXTENDED RELEASE ORAL
Qty: 30 TABLET | Refills: 1 | Status: SHIPPED | OUTPATIENT
Start: 2021-02-16 | End: 2021-04-19

## 2021-03-13 DIAGNOSIS — I10 ESSENTIAL HYPERTENSION: ICD-10-CM

## 2021-03-13 DIAGNOSIS — I50.32 CHRONIC DIASTOLIC CONGESTIVE HEART FAILURE (HCC): ICD-10-CM

## 2021-03-13 DIAGNOSIS — E03.9 ACQUIRED HYPOTHYROIDISM: ICD-10-CM

## 2021-03-14 RX ORDER — AMLODIPINE BESYLATE 5 MG/1
TABLET ORAL
Qty: 30 TABLET | Refills: 2 | Status: SHIPPED | OUTPATIENT
Start: 2021-03-14 | End: 2021-06-22

## 2021-03-14 RX ORDER — LEVOTHYROXINE SODIUM 0.1 MG/1
TABLET ORAL
Qty: 30 TABLET | Refills: 2 | Status: SHIPPED | OUTPATIENT
Start: 2021-03-14 | End: 2021-06-22

## 2021-03-14 RX ORDER — FUROSEMIDE 40 MG/1
TABLET ORAL
Qty: 30 TABLET | Refills: 1 | Status: SHIPPED | OUTPATIENT
Start: 2021-03-14 | End: 2021-05-24

## 2021-03-15 ENCOUNTER — OFFICE VISIT (OUTPATIENT)
Dept: INTERNAL MEDICINE CLINIC | Facility: CLINIC | Age: 86
End: 2021-03-15
Payer: MEDICARE

## 2021-03-15 VITALS
HEART RATE: 76 BPM | OXYGEN SATURATION: 100 % | BODY MASS INDEX: 36.64 KG/M2 | HEIGHT: 66 IN | WEIGHT: 228 LBS | TEMPERATURE: 97.6 F | SYSTOLIC BLOOD PRESSURE: 115 MMHG | DIASTOLIC BLOOD PRESSURE: 70 MMHG

## 2021-03-15 DIAGNOSIS — N18.32 STAGE 3B CHRONIC KIDNEY DISEASE (HCC): ICD-10-CM

## 2021-03-15 DIAGNOSIS — E66.01 OBESITY, MORBID (HCC): ICD-10-CM

## 2021-03-15 DIAGNOSIS — I87.2 VENOUS STASIS DERMATITIS OF BOTH LOWER EXTREMITIES: ICD-10-CM

## 2021-03-15 DIAGNOSIS — E03.9 ACQUIRED HYPOTHYROIDISM: ICD-10-CM

## 2021-03-15 DIAGNOSIS — I10 ESSENTIAL HYPERTENSION: ICD-10-CM

## 2021-03-15 DIAGNOSIS — R73.01 IMPAIRED FASTING BLOOD SUGAR: ICD-10-CM

## 2021-03-15 DIAGNOSIS — I50.32 CHRONIC DIASTOLIC CONGESTIVE HEART FAILURE (HCC): Primary | ICD-10-CM

## 2021-03-15 DIAGNOSIS — M1A.9XX0 CHRONIC GOUT WITHOUT TOPHUS, UNSPECIFIED CAUSE, UNSPECIFIED SITE: ICD-10-CM

## 2021-03-15 PROBLEM — E66.9 OBESITY (BMI 30-39.9): Status: ACTIVE | Noted: 2021-03-15

## 2021-03-15 PROCEDURE — 99214 OFFICE O/P EST MOD 30 MIN: CPT | Performed by: INTERNAL MEDICINE

## 2021-03-15 NOTE — PATIENT INSTRUCTIONS

## 2021-03-15 NOTE — PROGRESS NOTES
Assessment/Plan: This is 42-year-old gentleman with multiple medical problems  He is taking care of his wife who has Alzheimer's dementia  Because of his poor health condition he was advised to find alternative living arrangements with adequate support  He is very resistant to other arrangements  He does have 1 friend who comes and helps him but is also advanced in age  He did get the COVID vaccines  1  Chronic diastolic congestive heart failure ([facility] [address] )  Comments:  Continue diuretic and daily weight if there is 2 lb weight gain he was advised to take extra diuretic  2  Chronic gout without tophus, unspecified cause, unspecified site  Comments:  Stable now continue medications  Orders:  -     Uric acid; Future    3  Acquired hypothyroidism  -     TSH, 3rd generation; Future    4  Essential hypertension  Comments:  Daily home monitoring of his blood pressure was recommended  Orders:  -     CBC and differential; Future  -     Comprehensive metabolic panel; Future  -     UA (URINE) with reflex to Scope    5  Venous stasis dermatitis of both lower extremities    6  Impaired fasting blood sugar  Comments: The importance of cutting out carbs and sweets was again stressed  Orders:  -     Hemoglobin A1C; Future    7  Stage 3b chronic kidney disease    8  Obesity, morbid ([facility] [address] )           1  Chronic diastolic congestive heart failure ([facility] [address] )      2  Chronic gout without tophus, unspecified cause, unspecified site      3  Acquired hypothyroidism      4  Essential hypertension      5  Obesity (BMI 30-39  9)             Subjective:      Patient ID: Vic Page is a 80 y o  male      HPI    The following portions of the patient's history were reviewed and updated as appropriate: He  has a past medical history of VAN (acute kidney injury) ([facility] [address] ), Atrial flutter ([facility] [address] ), CHF (congestive heart failure) ([facility] [address] ), CKD (chronic kidney disease) stage 3, GFR 30-59 ml/min, Hypertension, Obstructed, uropathy, Sepsis ([address] ), and Venous stasis  He   Patient Active Problem List    Diagnosis Date Noted    Obesity (BMI 30-39 9) 03/15/2021    Chronic gout without tophus 03/15/2021    Impaired fasting blood sugar 03/15/2021    Venous stasis dermatitis of both lower extremities 03/15/2021    Obesity, morbid ([facility] ) 03/15/2021    [facility]est[facility])     PVC (premature ventricular contraction) 08/05/2019    Chronic diastolic congestive heart failure ([place] [address] ) 06/14/2017    Leukocytosis 06/14/2017    Venous stasis ulcer ([place] [address] ) 06/14/2017    Stage 3b chronic kidney disease 06/14/2017    Sepsis ([place] [address] ) 06/13/2017    Cellulitis of right lower extremity 06/13/2017    Lower extremity edema 06/13/2017    Essential hypertension 06/13/2017    Hypothyroid 06/13/2017     He  has a past surgical history that includes Prostate surgery; Transurethral resection of prostate; and Tonsillectomy  His family history is not on file  He  reports that he has never smoked  He has never used smokeless tobacco  He reports that he does not drink alcohol or use drugs  Current Outpatient Medications   Medication Sig Dispense Refill    allopurinol (ZYLOPRIM) 100 mg tablet TAKE 1 TAB TWICE A DAY 60 tablet 1    amLODIPine (NORVASC) 5 mg tablet TAKE 1 TAB DAILY 30 tablet 2    cholecalciferol (VITAMIN D3) 1,000 units tablet Take 1,000 Units by mouth daily      ferrous sulfate 325 (65 Fe) mg tablet Take 325 mg by mouth daily with breakfast      furosemide (LASIX) 40 mg tablet TAKE 1 TAB DAILY 30 tablet 1    levothyroxine 100 mcg tablet TAKE 1 TAB DAILY 30 tablet 2    metoprolol succinate (TOPROL-XL) 25 mg 24 hr tablet TAKE 1 TAB DAILY 30 tablet 1    neomycin-bacitracin-polymyxin (NEOSPORIN) 5-400-5,000 ointment Apply topically 3 (three) times a day for 5 days (Patient not taking: Reported on 1/13/2021) 28 3 g 0     No current facility-administered medications for this visit        Current Outpatient Medications on File Prior to Visit   Medication Sig    allopurinol (ZYLOPRIM) 100 mg tablet TAKE 1 TAB TWICE A DAY    amLODIPine (NORVASC) 5 mg tablet TAKE 1 TAB DAILY    cholecalciferol (VITAMIN D3) 1,000 units tablet Take 1,000 Units by mouth daily    ferrous sulfate 325 (65 Fe) mg tablet Take 325 mg by mouth daily with breakfast    furosemide (LASIX) 40 mg tablet TAKE 1 TAB DAILY    levothyroxine 100 mcg tablet TAKE 1 TAB DAILY    metoprolol succinate (TOPROL-XL) 25 mg 24 hr tablet TAKE 1 TAB DAILY    neomycin-bacitracin-polymyxin (NEOSPORIN) 5-400-5,000 ointment Apply topically 3 (three) times a day for 5 days (Patient not taking: Reported on 1/13/2021)     No current facility-administered medications on file prior to visit  He has No Known Allergies       Review of Systems   Constitutional: Negative for appetite change, chills, fatigue and fever  HENT: Negative for sore throat and trouble swallowing  Eyes: Negative for redness  Respiratory: Negative for shortness of breath  Cardiovascular: Negative for chest pain and palpitations  Gastrointestinal: Negative for abdominal pain, constipation and diarrhea  Genitourinary: Negative for dysuria and hematuria  Musculoskeletal: Negative for back pain and neck pain  Skin: Negative for rash  Neurological: Negative for seizures, weakness and headaches  Hematological: Negative for adenopathy  Psychiatric/Behavioral: Negative for confusion  The patient is not nervous/anxious  Objective:      /70 (BP Location: Right arm, Patient Position: Sitting, Cuff Size: Standard)   Pulse 76   Temp 97 6 °F (36 4 °C) (Temporal)   Ht 5' 6" (1 676 m)   Wt 103 kg (228 lb)   SpO2 100%   BMI 36 80 kg/m²     No results found for this or any previous visit (from the past 1344 hour(s))  Physical Exam  Constitutional:       General: He is not in acute distress  Appearance: Normal appearance  HENT:      Head: Normocephalic and atraumatic        Right Ear: Ear canal normal  Left Ear: Ear canal normal       Nose: Nose normal       Mouth/Throat:      Mouth: Mucous membranes are moist    Eyes:      Extraocular Movements: Extraocular movements intact  Pupils: Pupils are equal, round, and reactive to light  Neck:      Musculoskeletal: Normal range of motion and neck supple  Cardiovascular:      Rate and Rhythm: Normal rate and regular rhythm  Pulses: Normal pulses  Heart sounds: Normal heart sounds  No murmur  No friction rub  Pulmonary:      Effort: Pulmonary effort is normal  No respiratory distress  Breath sounds: Normal breath sounds  No wheezing  Abdominal:      General: Abdomen is flat  Bowel sounds are normal  There is no distension  Palpations: Abdomen is soft  There is no mass  Tenderness: There is no abdominal tenderness  There is no guarding  Musculoskeletal: Normal range of motion  Neurological:      General: No focal deficit present  Mental Status: He is alert and oriented to person, place, and time  Mental status is at baseline  Cranial Nerves: No cranial nerve deficit  Gait: Gait abnormal    Psychiatric:         Mood and Affect: Mood normal          Behavior: Behavior normal        BMI Counseling: Body mass index is 36 8 kg/m²  The BMI is above normal  Nutrition recommendations include reducing portion sizes, decreasing overall calorie intake, 3-5 servings of fruits/vegetables daily and consuming healthier snacks

## 2021-04-08 ENCOUNTER — APPOINTMENT (OUTPATIENT)
Dept: LAB | Facility: HOSPITAL | Age: 86
End: 2021-04-08
Attending: INTERNAL MEDICINE
Payer: MEDICARE

## 2021-04-08 DIAGNOSIS — R73.01 IMPAIRED FASTING BLOOD SUGAR: ICD-10-CM

## 2021-04-08 DIAGNOSIS — I10 ESSENTIAL HYPERTENSION: ICD-10-CM

## 2021-04-08 DIAGNOSIS — E03.9 ACQUIRED HYPOTHYROIDISM: ICD-10-CM

## 2021-04-08 DIAGNOSIS — M1A.9XX0 CHRONIC GOUT WITHOUT TOPHUS, UNSPECIFIED CAUSE, UNSPECIFIED SITE: ICD-10-CM

## 2021-04-08 LAB
ALBUMIN SERPL BCP-MCNC: 3.5 G/DL (ref 3.5–5)
ALP SERPL-CCNC: 60 U/L (ref 46–116)
ALT SERPL W P-5'-P-CCNC: 13 U/L (ref 12–78)
ANION GAP SERPL CALCULATED.3IONS-SCNC: 4 MMOL/L (ref 4–13)
AST SERPL W P-5'-P-CCNC: 9 U/L (ref 5–45)
BACTERIA UR QL AUTO: ABNORMAL /HPF
BASOPHILS # BLD AUTO: 0.06 THOUSANDS/ΜL (ref 0–0.1)
BASOPHILS NFR BLD AUTO: 1 % (ref 0–1)
BILIRUB SERPL-MCNC: 0.7 MG/DL (ref 0.2–1)
BILIRUB UR QL STRIP: NEGATIVE
BUN SERPL-MCNC: 27 MG/DL (ref 5–25)
CALCIUM SERPL-MCNC: 9.1 MG/DL (ref 8.3–10.1)
CHLORIDE SERPL-SCNC: 106 MMOL/L (ref 100–108)
CLARITY UR: CLEAR
CO2 SERPL-SCNC: 31 MMOL/L (ref 21–32)
COLOR UR: YELLOW
CREAT SERPL-MCNC: 2 MG/DL (ref 0.6–1.3)
EOSINOPHIL # BLD AUTO: 0.24 THOUSAND/ΜL (ref 0–0.61)
EOSINOPHIL NFR BLD AUTO: 4 % (ref 0–6)
ERYTHROCYTE [DISTWIDTH] IN BLOOD BY AUTOMATED COUNT: 15.9 % (ref 11.6–15.1)
EST. AVERAGE GLUCOSE BLD GHB EST-MCNC: 123 MG/DL
GFR SERPL CREATININE-BSD FRML MDRD: 29 ML/MIN/1.73SQ M
GLUCOSE P FAST SERPL-MCNC: 97 MG/DL (ref 65–99)
GLUCOSE UR STRIP-MCNC: NEGATIVE MG/DL
HBA1C MFR BLD: 5.9 %
HCT VFR BLD AUTO: 35.1 % (ref 36.5–49.3)
HGB BLD-MCNC: 11.2 G/DL (ref 12–17)
HGB UR QL STRIP.AUTO: NEGATIVE
HYALINE CASTS #/AREA URNS LPF: ABNORMAL /LPF
IMM GRANULOCYTES # BLD AUTO: 0.01 THOUSAND/UL (ref 0–0.2)
IMM GRANULOCYTES NFR BLD AUTO: 0 % (ref 0–2)
KETONES UR STRIP-MCNC: NEGATIVE MG/DL
LEUKOCYTE ESTERASE UR QL STRIP: ABNORMAL
LYMPHOCYTES # BLD AUTO: 1.59 THOUSANDS/ΜL (ref 0.6–4.47)
LYMPHOCYTES NFR BLD AUTO: 27 % (ref 14–44)
MCH RBC QN AUTO: 33.1 PG (ref 26.8–34.3)
MCHC RBC AUTO-ENTMCNC: 31.9 G/DL (ref 31.4–37.4)
MCV RBC AUTO: 104 FL (ref 82–98)
MONOCYTES # BLD AUTO: 0.57 THOUSAND/ΜL (ref 0.17–1.22)
MONOCYTES NFR BLD AUTO: 10 % (ref 4–12)
NEUTROPHILS # BLD AUTO: 3.4 THOUSANDS/ΜL (ref 1.85–7.62)
NEUTS SEG NFR BLD AUTO: 58 % (ref 43–75)
NITRITE UR QL STRIP: NEGATIVE
NON-SQ EPI CELLS URNS QL MICRO: ABNORMAL /HPF
NRBC BLD AUTO-RTO: 0 /100 WBCS
PH UR STRIP.AUTO: 6 [PH]
PLATELET # BLD AUTO: 164 THOUSANDS/UL (ref 149–390)
PMV BLD AUTO: 10.6 FL (ref 8.9–12.7)
POTASSIUM SERPL-SCNC: 4.2 MMOL/L (ref 3.5–5.3)
PROT SERPL-MCNC: 7.3 G/DL (ref 6.4–8.2)
PROT UR STRIP-MCNC: NEGATIVE MG/DL
RBC # BLD AUTO: 3.38 MILLION/UL (ref 3.88–5.62)
RBC #/AREA URNS AUTO: ABNORMAL /HPF
SODIUM SERPL-SCNC: 141 MMOL/L (ref 136–145)
SP GR UR STRIP.AUTO: 1.01 (ref 1–1.03)
TSH SERPL DL<=0.05 MIU/L-ACNC: 4.81 UIU/ML (ref 0.36–3.74)
URATE SERPL-MCNC: 7.9 MG/DL (ref 4.2–8)
UROBILINOGEN UR QL STRIP.AUTO: 0.2 E.U./DL
WBC # BLD AUTO: 5.87 THOUSAND/UL (ref 4.31–10.16)
WBC #/AREA URNS AUTO: ABNORMAL /HPF

## 2021-04-08 PROCEDURE — 36415 COLL VENOUS BLD VENIPUNCTURE: CPT

## 2021-04-08 PROCEDURE — 83036 HEMOGLOBIN GLYCOSYLATED A1C: CPT

## 2021-04-08 PROCEDURE — 84443 ASSAY THYROID STIM HORMONE: CPT

## 2021-04-08 PROCEDURE — 83550 IRON BINDING TEST: CPT

## 2021-04-08 PROCEDURE — 85025 COMPLETE CBC W/AUTO DIFF WBC: CPT

## 2021-04-08 PROCEDURE — 84550 ASSAY OF BLOOD/URIC ACID: CPT

## 2021-04-08 PROCEDURE — 82728 ASSAY OF FERRITIN: CPT

## 2021-04-08 PROCEDURE — 81001 URINALYSIS AUTO W/SCOPE: CPT | Performed by: INTERNAL MEDICINE

## 2021-04-08 PROCEDURE — 80053 COMPREHEN METABOLIC PANEL: CPT

## 2021-04-08 PROCEDURE — 83540 ASSAY OF IRON: CPT

## 2021-04-09 ENCOUNTER — TRANSCRIBE ORDERS (OUTPATIENT)
Dept: LAB | Facility: HOSPITAL | Age: 86
End: 2021-04-09

## 2021-04-09 DIAGNOSIS — I10 ESSENTIAL HYPERTENSION: Primary | ICD-10-CM

## 2021-04-09 LAB
FERRITIN SERPL-MCNC: 208 NG/ML (ref 8–388)
IRON SATN MFR SERPL: 34 %
IRON SERPL-MCNC: 89 UG/DL (ref 65–175)
TIBC SERPL-MCNC: 260 UG/DL (ref 250–450)

## 2021-04-19 DIAGNOSIS — I50.32 CHRONIC DIASTOLIC CONGESTIVE HEART FAILURE (HCC): ICD-10-CM

## 2021-04-19 RX ORDER — METOPROLOL SUCCINATE 25 MG/1
TABLET, EXTENDED RELEASE ORAL
Qty: 30 TABLET | Refills: 1 | Status: SHIPPED | OUTPATIENT
Start: 2021-04-19 | End: 2021-06-22

## 2021-05-03 ENCOUNTER — OFFICE VISIT (OUTPATIENT)
Dept: INTERNAL MEDICINE CLINIC | Facility: CLINIC | Age: 86
End: 2021-05-03
Payer: MEDICARE

## 2021-05-03 VITALS
TEMPERATURE: 97.9 F | HEART RATE: 84 BPM | DIASTOLIC BLOOD PRESSURE: 85 MMHG | BODY MASS INDEX: 36.64 KG/M2 | OXYGEN SATURATION: 98 % | HEIGHT: 66 IN | SYSTOLIC BLOOD PRESSURE: 135 MMHG | WEIGHT: 228 LBS

## 2021-05-03 DIAGNOSIS — I87.2 VENOUS STASIS DERMATITIS OF BOTH LOWER EXTREMITIES: ICD-10-CM

## 2021-05-03 DIAGNOSIS — N18.4 CKD (CHRONIC KIDNEY DISEASE) STAGE 4, GFR 15-29 ML/MIN (HCC): ICD-10-CM

## 2021-05-03 DIAGNOSIS — R26.9 GAIT ABNORMALITY: ICD-10-CM

## 2021-05-03 DIAGNOSIS — M1A.9XX0 CHRONIC GOUT WITHOUT TOPHUS, UNSPECIFIED CAUSE, UNSPECIFIED SITE: ICD-10-CM

## 2021-05-03 DIAGNOSIS — E03.9 ACQUIRED HYPOTHYROIDISM: ICD-10-CM

## 2021-05-03 DIAGNOSIS — R73.01 IMPAIRED FASTING BLOOD SUGAR: ICD-10-CM

## 2021-05-03 DIAGNOSIS — E66.01 OBESITY, MORBID (HCC): ICD-10-CM

## 2021-05-03 DIAGNOSIS — I50.32 CHRONIC DIASTOLIC CONGESTIVE HEART FAILURE (HCC): Primary | ICD-10-CM

## 2021-05-03 PROCEDURE — 99214 OFFICE O/P EST MOD 30 MIN: CPT | Performed by: INTERNAL MEDICINE

## 2021-05-03 NOTE — PROGRESS NOTES
Assessment/Plan: This is a 35-year-old gentleman with a history of hypertension CHF hypothyroidism impaired fasting sugar obesity venous stasis ulcers gout and chronic kidney disease  He does live at home and has a large piece of land as well as house that he is taking care of  He is also taking care of his wife who has moderate   Alzheimer's dementia  He denies any excessive tiredness or cold intolerance  1  Chronic diastolic congestive heart failure ([facility] [address] )  Comments:   daily weight check in the morning was emphasized  P r n  diuretic for 3 lb weight gain was recommended    2  Chronic gout without tophus, unspecified cause, unspecified site  Comments:   continue allopurinol  Has not had any attacks lately  3  Acquired hypothyroidism  Comments:    Labs were discussed will continue levothyroxine at the current dose and monitor labs  4  CKD (chronic kidney disease) stage 4, GFR 15-29 ml/min ([facility])  Comments:    Labs were discussed and he was advised to stay off any nephrotoxic medication  5  Venous stasis dermatitis of both lower extremities  Comments:    Continue compression stockings and elevation of legs when possible    6  Obesity, morbid ([facility] [address] )  Comments:    Reduced calorie diet with high fiber was recommended  7  Impaired fasting blood sugar  Comments:    A1c was 5 9 will monitor  8  Gait abnormality  Comments:    He is at high risk for fall  1  Chronic diastolic congestive heart failure ([address] )      2  Chronic gout without tophus, unspecified cause, unspecified site      3  Acquired hypothyroidism      4  CKD (chronic kidney disease) stage 4, GFR 15-29 ml/min ([facility])             Subjective:      Patient ID: Saima Thornton is a 80 y o  male  This is a 35-year-old gentleman with a history of hypertension CHF hypothyroidism impaired fasting sugar obesity venous stasis ulcers gout and chronic kidney disease    He does live at home and has a large piece of land as well as house that he is taking care of  He is also taking care of his wife who has moderate   Alzheimer's dementia  The following portions of the patient's history were reviewed and updated as appropriate: He  has a past medical history of VAN (acute kidney injury) ([place] [address] ), Atrial flutter ([place] [address] ), CHF (congestive heart failure) ([place] [address] ), CKD (chronic kidney disease) stage 3, GFR 30-59 ml/min ([place] [address] ), Hypertension, Obstructed, uropathy, Sepsis ([place] [address] ), and Venous stasis  He   Patient Active Problem List    Diagnosis Date Noted    CKD (chronic kidney disease) stage 4, GFR 15-29 ml/min ([facility]) 05/03/2021    Obesity (BMI 30-39 9) 03/15/2021    Chronic gout without tophus 03/15/2021    Impaired fasting blood sugar 03/15/2021    Venous stasis dermatitis of both lower extremities 03/15/2021    Obesity, morbid ([place] [address] ) 03/15/2021    CHF (congestive heart failure) ([facility])     PVC (premature ventricular contraction) 08/05/2019    Chronic diastolic congestive heart failure ([place] [address] ) 06/14/2017    Leukocytosis 06/14/2017    Venous stasis ulcer ([place] [address] ) 06/14/2017    Stage 3b chronic kidney disease ([place] [address] ) 06/14/2017    Sepsis ([place] [address] ) 06/13/2017    Cellulitis of right lower extremity 06/13/2017    Lower extremity edema 06/13/2017    Essential hypertension 06/13/2017    Acquired hypothyroidism 06/13/2017     He  has a past surgical history that includes Prostate surgery; Transurethral resection of prostate; and Tonsillectomy  His family history is not on file  He  reports that he has never smoked  He has never used smokeless tobacco  He reports that he does not drink alcohol or use drugs    Current Outpatient Medications   Medication Sig Dispense Refill    allopurinol (ZYLOPRIM) 100 mg tablet TAKE 1 TAB TWICE A DAY 60 tablet 1    amLODIPine (NORVASC) 5 mg tablet TAKE 1 TAB DAILY 30 tablet 2    cholecalciferol (VITAMIN D3) 1,000 units tablet Take 1,000 Units by mouth daily      ferrous sulfate 325 (65 Fe) mg tablet Take 325 mg by mouth daily with breakfast      furosemide (LASIX) 40 mg tablet TAKE 1 TAB DAILY 30 tablet 1    levothyroxine 100 mcg tablet TAKE 1 TAB DAILY 30 tablet 2    metoprolol succinate (TOPROL-XL) 25 mg 24 hr tablet TAKE 1 TAB DAILY 30 tablet 1    neomycin-bacitracin-polymyxin (NEOSPORIN) 5-400-5,000 ointment Apply topically 3 (three) times a day for 5 days (Patient not taking: Reported on 1/13/2021) 28 3 g 0     No current facility-administered medications for this visit  Current Outpatient Medications on File Prior to Visit   Medication Sig    allopurinol (ZYLOPRIM) 100 mg tablet TAKE 1 TAB TWICE A DAY    amLODIPine (NORVASC) 5 mg tablet TAKE 1 TAB DAILY    cholecalciferol (VITAMIN D3) 1,000 units tablet Take 1,000 Units by mouth daily    ferrous sulfate 325 (65 Fe) mg tablet Take 325 mg by mouth daily with breakfast    furosemide (LASIX) 40 mg tablet TAKE 1 TAB DAILY    levothyroxine 100 mcg tablet TAKE 1 TAB DAILY    metoprolol succinate (TOPROL-XL) 25 mg 24 hr tablet TAKE 1 TAB DAILY    neomycin-bacitracin-polymyxin (NEOSPORIN) 5-400-5,000 ointment Apply topically 3 (three) times a day for 5 days (Patient not taking: Reported on 1/13/2021)     No current facility-administered medications on file prior to visit  He has No Known Allergies       Review of Systems   Constitutional: Negative for appetite change, chills, fatigue and fever  HENT: Negative for sore throat and trouble swallowing  Eyes: Negative for redness  Respiratory: Negative for shortness of breath  Cardiovascular: Negative for chest pain and palpitations  Gastrointestinal: Negative for abdominal pain, constipation and diarrhea  Endocrine: Negative for cold intolerance  Genitourinary: Negative for dysuria and hematuria  Musculoskeletal: Negative for back pain and neck pain  Skin: Negative for rash  Allergic/Immunologic: Negative for environmental allergies  Neurological: Negative for seizures, weakness and headaches  Hematological: Negative for adenopathy  Psychiatric/Behavioral: Negative for confusion  The patient is not nervous/anxious            Objective:      /85 (BP Location: Left arm, Patient Position: Sitting, Cuff Size: Standard)   Pulse 84   Temp 97 9 °F (36 6 °C) (Temporal)   Ht 5' 6" (1 676 m)   Wt 103 kg (228 lb)   SpO2 98%   BMI 36 80 kg/m²     Recent Results (from the past 1344 hour(s))   UA (URINE) with reflex to Scope    Collection Time: 04/08/21  8:20 AM   Result Value Ref Range    Color, UA Yellow     Clarity, UA Clear     Specific Gravity, UA 1 008 1 003 - 1 030    pH, UA 6 0 4 5, 5 0, 5 5, 6 0, 6 5, 7 0, 7 5, 8 0    Leukocytes, UA Small (A) Negative    Nitrite, UA Negative Negative    Protein, UA Negative Negative mg/dl    Glucose, UA Negative Negative mg/dl    Ketones, UA Negative Negative mg/dl    Urobilinogen, UA 0 2 0 2, 1 0 E U /dl E U /dl    Bilirubin, UA Negative Negative    Blood, UA Negative Negative   CBC and differential    Collection Time: 04/08/21  8:20 AM   Result Value Ref Range    WBC 5 87 4 31 - 10 16 Thousand/uL    RBC 3 38 (L) 3 88 - 5 62 Million/uL    Hemoglobin 11 2 (L) 12 0 - 17 0 g/dL    Hematocrit 35 1 (L) 36 5 - 49 3 %     (H) 82 - 98 fL    MCH 33 1 26 8 - 34 3 pg    MCHC 31 9 31 4 - 37 4 g/dL    RDW 15 9 (H) 11 6 - 15 1 %    MPV 10 6 8 9 - 12 7 fL    Platelets 664 540 - 569 Thousands/uL    nRBC 0 /100 WBCs    Neutrophils Relative 58 43 - 75 %    Immat GRANS % 0 0 - 2 %    Lymphocytes Relative 27 14 - 44 %    Monocytes Relative 10 4 - 12 %    Eosinophils Relative 4 0 - 6 %    Basophils Relative 1 0 - 1 %    Neutrophils Absolute 3 40 1 85 - 7 62 Thousands/µL    Immature Grans Absolute 0 01 0 00 - 0 20 Thousand/uL    Lymphocytes Absolute 1 59 0 60 - 4 47 Thousands/µL    Monocytes Absolute 0 57 0 17 - 1 22 Thousand/µL    Eosinophils Absolute 0 24 0 00 - 0 61 Thousand/µL    Basophils Absolute 0 06 0 00 - 0 10 Thousands/µL   Comprehensive metabolic panel Collection Time: 04/08/21  8:20 AM   Result Value Ref Range    Sodium 141 136 - 145 mmol/L    Potassium 4 2 3 5 - 5 3 mmol/L    Chloride 106 100 - 108 mmol/L    CO2 31 21 - 32 mmol/L    ANION GAP 4 4 - 13 mmol/L    BUN 27 (H) 5 - 25 mg/dL    Creatinine 2 00 (H) 0 60 - 1 30 mg/dL    Glucose, Fasting 97 65 - 99 mg/dL    Calcium 9 1 8 3 - 10 1 mg/dL    AST 9 5 - 45 U/L    ALT 13 12 - 78 U/L    Alkaline Phosphatase 60 46 - 116 U/L    Total Protein 7 3 6 4 - 8 2 g/dL    Albumin 3 5 3 5 - 5 0 g/dL    Total Bilirubin 0 70 0 20 - 1 00 mg/dL    eGFR 29 ml/min/1 73sq m   Hemoglobin A1C    Collection Time: 04/08/21  8:20 AM   Result Value Ref Range    Hemoglobin A1C 5 9 (H) Normal 3 8-5 6%; PreDiabetic 5 7-6 4%; Diabetic >=6 5%; Glycemic control for adults with diabetes <7 0% %     mg/dl   TSH, 3rd generation    Collection Time: 04/08/21  8:20 AM   Result Value Ref Range    TSH 3RD GENERATON 4 810 (H) 0 358 - 3 740 uIU/mL   Uric acid    Collection Time: 04/08/21  8:20 AM   Result Value Ref Range    Uric Acid 7 9 4 2 - 8 0 mg/dL   Urine Microscopic    Collection Time: 04/08/21  8:20 AM   Result Value Ref Range    RBC, UA None Seen None Seen, 2-4 /hpf    WBC, UA 10-20 (A) None Seen, 2-4 /hpf    Epithelial Cells None Seen None Seen, Occasional /hpf    Bacteria, UA None Seen None Seen, Occasional /hpf    Hyaline Casts, UA None Seen None Seen /lpf   Iron Saturation %    Collection Time: 04/08/21  8:20 AM   Result Value Ref Range    Iron Saturation 34 %    TIBC 260 250 - 450 ug/dL    Iron 89 65 - 175 ug/dL   Ferritin    Collection Time: 04/08/21  8:20 AM   Result Value Ref Range    Ferritin 208 8 - 388 ng/mL        Physical Exam  Constitutional:       General: He is not in acute distress  Appearance: Normal appearance  He is obese  HENT:      Head: Normocephalic and atraumatic        Right Ear: Tympanic membrane normal       Left Ear: Tympanic membrane normal       Nose: Nose normal       Mouth/Throat:      Mouth: Mucous membranes are moist    Eyes:      Extraocular Movements: Extraocular movements intact  Pupils: Pupils are equal, round, and reactive to light  Neck:      Musculoskeletal: Normal range of motion and neck supple  Cardiovascular:      Rate and Rhythm: Normal rate and regular rhythm  Pulses: Normal pulses  Heart sounds: Normal heart sounds  No murmur  No friction rub  Pulmonary:      Effort: Pulmonary effort is normal  No respiratory distress  Breath sounds: Normal breath sounds  No wheezing  Abdominal:      General: Abdomen is flat  Bowel sounds are normal  There is no distension  Palpations: Abdomen is soft  There is no mass  Tenderness: There is no abdominal tenderness  There is no guarding  Musculoskeletal: Normal range of motion  Skin:     Comments: Stasis dermatitis is present in the legs  Neurological:      General: No focal deficit present  Mental Status: He is alert and oriented to person, place, and time  Mental status is at baseline  Cranial Nerves: No cranial nerve deficit     Psychiatric:         Mood and Affect: Mood normal          Behavior: Behavior normal

## 2021-05-03 NOTE — PATIENT INSTRUCTIONS
Heart Failure   AMBULATORY CARE:   Heart failure  is a condition that does not allow your heart to fill or pump properly  Not enough oxygen in your blood gets to your organs and tissues  Fluid may not move through your body properly  Fluid builds up and causes swelling and trouble breathing  This is known as congestive heart failure  Heart failure may start in the left or right ventricle  Heart failure is often caused by damage or injury to your heart  The damage may be caused by other heart problems, diabetes, or high blood pressure  The damage may have also been caused by an infection  Heart failure is a long-term condition that tends to get worse over time  It is important to manage your health to improve your quality of life  Common signs and symptoms:   · Trouble breathing with activity that worsens to trouble breathing at rest    · Shortness of breath while lying flat    · Severe shortness of breath and coughing at night that usually wakes you    · Feeling lightheaded when you stand up    · Purple color around your mouth and nails    · Confusion or anxiety    · Chest pain at night    · Periods of no breathing, then breathing fast    · Lack of energy (often worsened by physical activity), or trouble sleeping    · Swelling in your ankles, legs, or abdomen    · Heartbeat that is fast or not regular    · Fingers and toes feel cool to the touch    Call your local emergency number (911 in the [address] [facility]) if:   · You have any of the following signs of a heart attack:      ? Squeezing, pressure, or pain in your chest    ? You may  also have any of the following:     § Discomfort or pain in your back, neck, jaw, stomach, or arm    § Shortness of breath    § Nausea or vomiting    § Lightheadedness or a sudden cold sweat      Call your doctor if:   · Your heartbeat is fast, slow, or uneven all the time  · You have symptoms of worsening heart failure:      ?  Shortness of breath at rest, at night, or that is getting worse in any way    ? Weight gain of 3 or more pounds (1 4 kg) in a day, or more than your healthcare provider says is okay    ? More swelling in your legs or ankles    ? Abdominal pain or swelling    ? More coughing    ? Loss of appetite    ? Feeling tired all the time    · You feel hopeless or depressed, or you have lost interest in things you used to enjoy  · You often feel worried or afraid  · You have questions or concerns about your condition or care  Treatment for heart failure  may include any of the following:  · Medicines  may be needed to help regulate your heart rhythm  You may also need medicines to lower your blood pressure, and to decrease extra fluids  · Oxygen  may help you breathe easier if your oxygen level is lower than normal  A CPAP machine may be used to keep your airway open while you sleep  · Cardiac rehab  is a program run by specialists who will help you safely strengthen your heart  In the program you will learn about exercise, relaxation, stress management, and heart-healthy nutrition  Cardiac rehab may be recommended if your heart failure is not severe  · Surgery  can be done to implant a pacemaker or another device in your chest to regulate your heart rhythm  Other types of surgery can open blocked heart vessels, replace a damaged heart valve, or remove scar tissue  Manage swelling from extra fluid:   · Elevate (raise) your legs above the level of your heart  This will help with fluid that builds up in your legs or ankles  Elevate your legs as often as possible during the day  Prop your legs on pillows or blankets to keep them elevated comfortably  Try not to stand for long periods of time during the day  Move around to keep your blood circulating  · Limit sodium (salt)  Ask how much sodium you can have each day  Your healthcare provider may give you a limit, such as 2,300 milligrams (mg) a day   Your provider or a dietitian can teach you how to read food labels for the number of mg in a food  He or she can also help you find ways to have less salt  For example, if you add salt to food as you cook, do not add more at the table  · Drink liquids as directed  You may need to limit the amount of liquid you drink within 24 hours  Your healthcare provider will tell you how much liquid to have and which liquids are best for you  He or she may tell you to limit liquid to 1 5 to 2 liters in a day  He or she will also tell you how often to drink liquid throughout the day  · Weigh yourself every morning  Use the same scale, in the same spot  Do this after you use the bathroom, but before you eat or drink  Wear the same type of clothing each time  Write down your weight and call your healthcare provider if you have a sudden weight gain  Swelling and weight gain are signs of fluid buildup  Manage heart failure: Your quality of life may improve with treatment and the following:  · Do not smoke  Nicotine and other chemicals in cigarettes and cigars can cause lung and heart damage  Ask your healthcare provider for information if you currently smoke and need help to quit  E-cigarettes or smokeless tobacco still contain nicotine  Talk to your healthcare provider before you use these products  · Do not drink alcohol or use illegal drugs  Alcohol and drugs can increase your risk for high blood pressure, diabetes, and coronary artery disease  · Eat heart-healthy foods  Heart-healthy foods include fruits, vegetables, lean meat (such as beef, chicken, or pork), and low-fat dairy products  Fatty fish such as salmon and tuna are also heart healthy  Other heart-healthy foods include walnuts, whole-grain breads, beans, and cooked beans  Replace butter and margarine with heart-healthy oils such as olive oil or canola oil  Your provider or a dietitian can help you create heart-healthy meal plans  · Manage any chronic health conditions you have    These include high blood pressure, diabetes, obesity, high cholesterol, metabolic syndrome, and COPD  You will have fewer symptoms if you manage these health conditions  Follow your healthcare provider's recommendations and follow up with him or her regularly  · Maintain a healthy weight  Being overweight can increase your risk for high blood pressure, diabetes, and coronary artery disease  These conditions can make your symptoms worse  Ask your healthcare provider how much you should weigh  Ask him or her to help you create a weight loss plan if you are overweight  · Stay active  Activity can help keep your symptoms from getting worse  Walking is a type of physical activity that helps maintain your strength and improve your mood  Physical activity also helps you manage your weight  Work with your healthcare provider to create an exercise plan that is right for you  · Get vaccines as directed  The flu and pneumonia can be severe for a person who has heart failure  Vaccines protect you from these infections  Get a flu shot every year as soon as it is recommended, usually in September or October  You may also need the pneumonia vaccine  Your healthcare provider can tell you if you need other vaccines, and when to get them  Follow up with your doctor or cardiologist within 7 days or as directed: You may need to return for other tests  You may need home health care  A healthcare provider will monitor your vital signs, weight, and make sure your medicines are working  Write down your questions so you remember to ask them during your visits  Join a support group:  Heart failure can be difficult to manage  It may be helpful to talk with others who have heart failure  You may learn how to better manage your condition or get emotional support  For more information:  · [address]  [place] , [place]   Phone: 2- 248 - [phone]  Web Address: https://www strong com/  org     © Copyright [employer] 2021 Information is for [employer] use only and may not be sold, redistributed or otherwise used for commercial purposes  All illustrations and images included in CareNotes® are the copyrighted property of [employer]  or [employer]  The above information is an  only  It is not intended as medical advice for individual conditions or treatments  Talk to your doctor, nurse or pharmacist before following any medical regimen to see if it is safe and effective for you

## 2021-05-24 DIAGNOSIS — I50.32 CHRONIC DIASTOLIC CONGESTIVE HEART FAILURE (HCC): ICD-10-CM

## 2021-05-24 RX ORDER — FUROSEMIDE 40 MG/1
TABLET ORAL
Qty: 30 TABLET | Refills: 1 | Status: SHIPPED | OUTPATIENT
Start: 2021-05-24 | End: 2021-07-23

## 2021-06-22 DIAGNOSIS — I50.32 CHRONIC DIASTOLIC CONGESTIVE HEART FAILURE (HCC): ICD-10-CM

## 2021-06-22 DIAGNOSIS — I10 ESSENTIAL HYPERTENSION: ICD-10-CM

## 2021-06-22 DIAGNOSIS — E03.9 ACQUIRED HYPOTHYROIDISM: ICD-10-CM

## 2021-06-22 RX ORDER — LEVOTHYROXINE SODIUM 0.1 MG/1
TABLET ORAL
Qty: 30 TABLET | Refills: 2 | Status: SHIPPED | OUTPATIENT
Start: 2021-06-22 | End: 2021-09-24

## 2021-06-22 RX ORDER — METOPROLOL SUCCINATE 25 MG/1
TABLET, EXTENDED RELEASE ORAL
Qty: 30 TABLET | Refills: 1 | Status: SHIPPED | OUTPATIENT
Start: 2021-06-22 | End: 2021-08-30

## 2021-06-22 RX ORDER — AMLODIPINE BESYLATE 5 MG/1
TABLET ORAL
Qty: 30 TABLET | Refills: 2 | Status: SHIPPED | OUTPATIENT
Start: 2021-06-22 | End: 2021-09-24

## 2021-07-23 DIAGNOSIS — I50.32 CHRONIC DIASTOLIC CONGESTIVE HEART FAILURE (HCC): ICD-10-CM

## 2021-07-23 DIAGNOSIS — M1A.9XX0 CHRONIC GOUT WITHOUT TOPHUS, UNSPECIFIED CAUSE, UNSPECIFIED SITE: ICD-10-CM

## 2021-07-23 RX ORDER — FUROSEMIDE 40 MG/1
TABLET ORAL
Qty: 30 TABLET | Refills: 1 | Status: SHIPPED | OUTPATIENT
Start: 2021-07-23 | End: 2021-09-24

## 2021-07-23 RX ORDER — ALLOPURINOL 100 MG/1
TABLET ORAL
Qty: 60 TABLET | Refills: 1 | Status: SHIPPED | OUTPATIENT
Start: 2021-07-23 | End: 2022-04-28 | Stop reason: SDUPTHER

## 2021-08-03 ENCOUNTER — OFFICE VISIT (OUTPATIENT)
Dept: INTERNAL MEDICINE CLINIC | Facility: CLINIC | Age: 86
End: 2021-08-03
Payer: MEDICARE

## 2021-08-03 VITALS
DIASTOLIC BLOOD PRESSURE: 84 MMHG | WEIGHT: 228 LBS | HEART RATE: 63 BPM | HEIGHT: 66 IN | TEMPERATURE: 97.6 F | SYSTOLIC BLOOD PRESSURE: 138 MMHG | BODY MASS INDEX: 36.64 KG/M2 | OXYGEN SATURATION: 99 %

## 2021-08-03 DIAGNOSIS — D50.8 OTHER IRON DEFICIENCY ANEMIA: ICD-10-CM

## 2021-08-03 DIAGNOSIS — Z00.00 MEDICARE ANNUAL WELLNESS VISIT, SUBSEQUENT: Primary | ICD-10-CM

## 2021-08-03 DIAGNOSIS — I50.32 CHRONIC DIASTOLIC CONGESTIVE HEART FAILURE (HCC): ICD-10-CM

## 2021-08-03 DIAGNOSIS — M1A.9XX0 CHRONIC GOUT WITHOUT TOPHUS, UNSPECIFIED CAUSE, UNSPECIFIED SITE: ICD-10-CM

## 2021-08-03 DIAGNOSIS — E66.01 OBESITY, MORBID (HCC): ICD-10-CM

## 2021-08-03 DIAGNOSIS — I10 ESSENTIAL HYPERTENSION: ICD-10-CM

## 2021-08-03 DIAGNOSIS — R26.9 GAIT ABNORMALITY: ICD-10-CM

## 2021-08-03 DIAGNOSIS — N18.4 CKD (CHRONIC KIDNEY DISEASE) STAGE 4, GFR 15-29 ML/MIN (HCC): ICD-10-CM

## 2021-08-03 DIAGNOSIS — E03.9 ACQUIRED HYPOTHYROIDISM: ICD-10-CM

## 2021-08-03 PROCEDURE — 99214 OFFICE O/P EST MOD 30 MIN: CPT | Performed by: INTERNAL MEDICINE

## 2021-08-03 PROCEDURE — 1123F ACP DISCUSS/DSCN MKR DOCD: CPT | Performed by: INTERNAL MEDICINE

## 2021-08-03 PROCEDURE — G0438 PPPS, INITIAL VISIT: HCPCS | Performed by: INTERNAL MEDICINE

## 2021-08-03 NOTE — PROGRESS NOTES
Assessment and Plan:     Problem List Items Addressed This Visit        Endocrine    Acquired hypothyroidism       Cardiovascular and Mediastinum    Chronic diastolic congestive heart failure (HCC)       Other    Chronic gout without tophus      Other Visit Diagnoses     Medicare annual wellness visit, subsequent    -  Primary           Preventive health issues were discussed with patient, and age appropriate screening tests were ordered as noted in patient's After Visit Summary  Personalized health advice and appropriate referrals for health education or preventive services given if needed, as noted in patient's After Visit Summary  History of Present Illness:     Patient presents for Medicare Annual Wellness visit    Patient Care Team:  Asya Hightower MD as PCP - General  MICHELLE Rod     Problem List:     Patient Active Problem List   Diagnosis    Sepsis [facility])    Cellulitis of right lower extremity    Lower extremity edema    Essential hypertension    Acquired hypothyroidism    Chronic diastolic congestive heart failure (HCC)    Leukocytosis    Venous stasis ulcer ([address] )    Stage 3b chronic kidney disease [facility]    PVC (premature ventricular contraction)    Obesity (BMI 30-39  9)    Chronic gout without tophus    CHF (congestive heart failure) (HCC)    Impaired fasting blood sugar    Venous stasis dermatitis of both lower extremities    Obesity, morbid (HCC)    CKD (chronic kidney disease) stage 4, GFR 15-29 ml/min ([facility])      Past Medical and Surgical History:     Past Medical History:   Diagnosis Date    VAN (acute kidney injury) ([facility] )     Atrial flutter (HCC)     CHF (congestive heart failure) (HCC)     CKD (chronic kidney disease) stage 3, GFR 30-59 ml/min ([facility])     Hypertension     Obstructed, uropathy     Sepsis ([address] )     Venous stasis      Past Surgical History:   Procedure Laterality Date    PROSTATE SURGERY      TONSILLECTOMY      TRANSURETHRAL RESECTION OF PROSTATE Family History:     History reviewed  No pertinent family history  Social History:     Social History     Socioeconomic History    Marital status: /Civil Union     Spouse name: None    Number of children: 0    Years of education: None    Highest education level: None   Occupational History    None   Tobacco Use    Smoking status: Never Smoker    Smokeless tobacco: Never Used   Substance and Sexual Activity    Alcohol use: No    Drug use: No    Sexual activity: None   Other Topics Concern    None   Social History Narrative    Travel outside [place]: No      Social Determinants of Health     Financial Resource Strain:     Difficulty of Paying Living Expenses:    Food Insecurity:     Worried About Running Out of Food in the Last Year:     [address] in the Last Year:    Transportation Needs:     Lack of Transportation (Medical):      Lack of Transportation (Non-Medical):    Physical Activity:     Days of Exercise per Week:     Minutes of Exercise per Session:    Stress:     Feeling of Stress :    Social Connections:     Frequency of Communication with Friends and Family:     Frequency of Social Gatherings with Friends and Family:     Attends Confucianism Services:     Active Member of Clubs or Organizations:     Attends Club or Organization Meetings:     Marital Status:    Intimate Partner Violence:     Fear of Current or Ex-Partner:     Emotionally Abused:     Physically Abused:     Sexually Abused:       Medications and Allergies:     Current Outpatient Medications   Medication Sig Dispense Refill    allopurinol (ZYLOPRIM) 100 mg tablet TAKE 1 TAB TWICE A DAY 60 tablet 1    amLODIPine (NORVASC) 5 mg tablet TAKE 1 TAB DAILY 30 tablet 2    cholecalciferol (VITAMIN D3) 1,000 units tablet Take 1,000 Units by mouth daily      ferrous sulfate 325 (65 Fe) mg tablet Take 325 mg by mouth daily with breakfast      furosemide (LASIX) 40 mg tablet TAKE 1 TABLET DAILY 30 tablet 1    levothyroxine 100 mcg tablet TAKE 1 TAB DAILY 30 tablet 2    metoprolol succinate (TOPROL-XL) 25 mg 24 hr tablet TAKE 1 TAB DAILY 30 tablet 1    neomycin-bacitracin-polymyxin (NEOSPORIN) 5-400-5,000 ointment Apply topically 3 (three) times a day for 5 days (Patient not taking: Reported on 1/13/2021) 28 3 g 0     No current facility-administered medications for this visit  No Known Allergies   Immunizations:     Immunization History   Administered Date(s) Administered    Pneumococcal Conjugate 13-Valent 03/09/2015    SARS-CoV-2 / COVID-19 mRNA IM (Pfizer-BioNTech) 01/17/2021, 02/08/2021    Tdap 06/13/2017, 06/06/2020    Zoster 12/30/2013      Health Maintenance: There are no preventive care reminders to display for this patient  Topic Date Due    Pneumococcal Vaccine: 65+ Years (1 of 2 - PPSV23) 05/04/2015    Influenza Vaccine (1) 09/01/2021      Medicare Health Risk Assessment:     /84 (BP Location: Left arm, Patient Position: Sitting, Cuff Size: Standard)   Pulse 63   Temp 97 6 °F (36 4 °C) (Temporal)   Ht 5' 6" (1 676 m)   Wt 103 kg (228 lb)   SpO2 99%   BMI 36 80 kg/m²      Rylee Colon is here for his Subsequent Wellness visit  Health Risk Assessment:   Patient rates overall health as good  Patient feels that their physical health rating is same  Patient is very satisfied with their life  Eyesight was rated as same  Hearing was rated as same  Patient feels that their emotional and mental health rating is same  Patients states they are never, rarely angry  Patient states they are never, rarely unusually tired/fatigued  Pain experienced in the last 7 days has been none  Patient states that he has experienced no weight loss or gain in last 6 months  Fall Risk Screening: In the past year, patient has experienced: no history of falling in past year      Home Safety:  Patient has trouble with stairs inside or outside of their home   Patient has working smoke alarms and has working carbon monoxide detector  Home safety hazards include: none  Nutrition:   Current diet is Regular  Medications:   Patient is currently taking over-the-counter supplements  OTC medications include: see medication list  Patient is able to manage medications  Activities of Daily Living (ADLs)/Instrumental Activities of Daily Living (IADLs):   Walk and transfer into and out of bed and chair?: Yes  Dress and groom yourself?: Yes    Bathe or shower yourself?: Yes    Feed yourself? Yes  Do your laundry/housekeeping?: Yes  Manage your money, pay your bills and track your expenses?: Yes  Make your own meals?: Yes    Do your own shopping?: Yes    Previous Hospitalizations:   Any hospitalizations or ED visits within the last 12 months?: Yes    How many hospitalizations have you had in the last year?: 1-2    Advance Care Planning:   Living will: Yes    Durable POA for healthcare: Yes    Advanced directive: Yes      PREVENTIVE SCREENINGS      Cardiovascular Screening:    General: Screening Current      Diabetes Screening:     General: Screening Current      Colorectal Cancer Screening:     General: Screening Not Indicated      Prostate Cancer Screening:    General: Screening Not Indicated      Lung Cancer Screening:     General: Screening Not Indicated    Screening, Brief Intervention, and Referral to Treatment (SBIRT)    Screening  Typical number of drinks in a day: 0  Typical number of drinks in a week: 0  Interpretation: Low risk drinking behavior  Single Item Drug Screening:  How often have you used an illegal drug (including marijuana) or a prescription medication for non-medical reasons in the past year? never    Single Item Drug Screen Score: 0  Interpretation: Negative screen for possible drug use disorder    Other Counseling Topics:   Car/seat belt/driving safety, skin self-exam, sunscreen and regular weightbearing exercise and calcium and vitamin D intake         Karo Dave MD

## 2021-08-03 NOTE — PROGRESS NOTES
Assessment/Plan: This is a 31-year-old gentleman with a history of hypertension atrial flutter CHF chronic kidney disease obesity gout impaired fasting sugar venous stasis of both lower extremities CHF obstructive uropathy status post TURP  Currently he denies any chest pain or shortness of breath  or any nocturnal dyspnea  Penny Reinamond He continues to live independently in his home and takes care of his wife who has Alzheimer's dementia  He is not in touch with his nephew who is probably close to 79 years in age  At this time he he does not want to move out of his residence and live in a senior living arrangement  He is still able to mow his lawn and he has a friend who comes and helps him occasionally  1  Medicare annual wellness visit, subsequent    2  Chronic diastolic congestive heart failure (HCC)  Comments:    Continue furosemide and monitor electrolytes  3  Chronic gout without tophus, unspecified cause, unspecified site  Comments:    Continue allopurinol  4  Acquired hypothyroidism  Comments:    Continue levothyroxine labs were reviewed  5  Essential hypertension  -     CBC (Includes Diff/Plt) (Refl); Future  -     Comprehensive metabolic panel; Future  -     Urinalysis with microscopic    6  CKD (chronic kidney disease) stage 4, GFR 15-29 ml/min ([facility])    7  Other iron deficiency anemia  -     Iron Saturation %; Future  -     Ferritin; Future    8  Gait abnormality    9  Obesity, morbid ([address] )  Comments:    Strongly advised to lose weight  1  Medicare annual wellness visit, subsequent      2  Chronic diastolic congestive heart failure ([facility] )      3  Chronic gout without tophus, unspecified cause, unspecified site      4  Acquired hypothyroidism             Subjective:      Patient ID: Viky Smyth is a 80 y o  male       This is a 31-year-old gentleman with a history of hypertension atrial flutter CHF chronic kidney disease obesity gout impaired fasting sugar venous stasis of both lower extremities CHF obstructive uropathy status post TURP  Currently he denies any chest pain or shortness of breath  or any nocturnal dyspnea  Lynita Ped He continues to live independently in his home and takes care of his wife who has Alzheimer's dementia  He is not in touch with his nephew who is probably close to 79 years in age  At this time he he does not want to move out of his residence and live in a senior living arrangement  He is still able to mow his lawn and he has a friend who comes and helps him occasionally  The following portions of the patient's history were reviewed and updated as appropriate: He  has a past medical history of VAN (acute kidney injury) ([facility] ), Atrial flutter ([place] [id number] ), CHF (congestive heart failure) ([place] [address] ), CKD (chronic kidney disease) stage 3, GFR 30-59 ml/min ([facility] ), Hypertension, Obstructed, uropathy, Sepsis ([facility] ), and Venous stasis  He   Patient Active Problem List    Diagnosis Date Noted    CKD (chronic kidney disease) stage 4, GFR 15-29 ml/min ([facility]) 05/03/2021    Obesity (BMI 30-39 9) 03/15/2021    Chronic gout without tophus 03/15/2021    Impaired fasting blood sugar 03/15/2021    Venous stasis dermatitis of both lower extremities 03/15/2021    Obesity, morbid ([facility] ) 03/15/2021    CHF (congestive heart failure) ([facility])     PVC (premature ventricular contraction) 08/05/2019    Chronic diastolic congestive heart failure ([facility] ) 06/14/2017    Leukocytosis 06/14/2017    Venous stasis ulcer ([facility] ) 06/14/2017    Stage 3b chronic kidney disease ([facility] ) 06/14/2017    Sepsis ([facility] ) 06/13/2017    Cellulitis of right lower extremity 06/13/2017    Lower extremity edema 06/13/2017    Essential hypertension 06/13/2017    Acquired hypothyroidism 06/13/2017     He  has a past surgical history that includes Prostate surgery; Transurethral resection of prostate; and Tonsillectomy  His family history is not on file  He  reports that he has never smoked   He has never used smokeless tobacco  He reports that he does not drink alcohol and does not use drugs  Current Outpatient Medications   Medication Sig Dispense Refill    allopurinol (ZYLOPRIM) 100 mg tablet TAKE 1 TAB TWICE A DAY 60 tablet 1    amLODIPine (NORVASC) 5 mg tablet TAKE 1 TAB DAILY 30 tablet 2    cholecalciferol (VITAMIN D3) 1,000 units tablet Take 1,000 Units by mouth daily      ferrous sulfate 325 (65 Fe) mg tablet Take 325 mg by mouth daily with breakfast      furosemide (LASIX) 40 mg tablet TAKE 1 TABLET DAILY 30 tablet 1    levothyroxine 100 mcg tablet TAKE 1 TAB DAILY 30 tablet 2    metoprolol succinate (TOPROL-XL) 25 mg 24 hr tablet TAKE 1 TAB DAILY 30 tablet 1    neomycin-bacitracin-polymyxin (NEOSPORIN) 5-400-5,000 ointment Apply topically 3 (three) times a day for 5 days (Patient not taking: Reported on 1/13/2021) 28 3 g 0     No current facility-administered medications for this visit  Current Outpatient Medications on File Prior to Visit   Medication Sig    allopurinol (ZYLOPRIM) 100 mg tablet TAKE 1 TAB TWICE A DAY    amLODIPine (NORVASC) 5 mg tablet TAKE 1 TAB DAILY    cholecalciferol (VITAMIN D3) 1,000 units tablet Take 1,000 Units by mouth daily    ferrous sulfate 325 (65 Fe) mg tablet Take 325 mg by mouth daily with breakfast    furosemide (LASIX) 40 mg tablet TAKE 1 TABLET DAILY    levothyroxine 100 mcg tablet TAKE 1 TAB DAILY    metoprolol succinate (TOPROL-XL) 25 mg 24 hr tablet TAKE 1 TAB DAILY    neomycin-bacitracin-polymyxin (NEOSPORIN) 5-400-5,000 ointment Apply topically 3 (three) times a day for 5 days (Patient not taking: Reported on 1/13/2021)     No current facility-administered medications on file prior to visit  He has No Known Allergies       Review of Systems   Constitutional: Negative for appetite change, chills, fatigue and fever  HENT: Negative for sore throat and trouble swallowing  Eyes: Negative for redness  Respiratory: Negative for shortness of breath      Cardiovascular: Negative for chest pain and palpitations  Gastrointestinal: Negative for abdominal pain, constipation and diarrhea  Genitourinary: Negative for dysuria and hematuria  Musculoskeletal: Negative for back pain and neck pain  Skin: Negative for rash  Neurological: Negative for seizures, weakness and headaches  Hematological: Negative for adenopathy  Psychiatric/Behavioral: Negative for confusion  The patient is not nervous/anxious  Objective:      /84 (BP Location: Left arm, Patient Position: Sitting, Cuff Size: Standard)   Pulse 63   Temp 97 6 °F (36 4 °C) (Temporal)   Ht 5' 6" (1 676 m)   Wt 103 kg (228 lb)   SpO2 99%   BMI 36 80 kg/m²     No results found for this or any previous visit (from the past 1344 hour(s))  Physical Exam  Constitutional:       General: He is not in acute distress  Appearance: Normal appearance  HENT:      Head: Normocephalic and atraumatic  Nose: Nose normal       Mouth/Throat:      Mouth: Mucous membranes are moist    Eyes:      Extraocular Movements: Extraocular movements intact  Pupils: Pupils are equal, round, and reactive to light  Cardiovascular:      Rate and Rhythm: Normal rate and regular rhythm  Pulses: Normal pulses  Heart sounds: Normal heart sounds  No murmur heard  No friction rub  Pulmonary:      Effort: Pulmonary effort is normal  No respiratory distress  Breath sounds: Normal breath sounds  No wheezing  Abdominal:      General: Abdomen is flat  Bowel sounds are normal  There is no distension  Palpations: Abdomen is soft  There is no mass  Tenderness: There is no abdominal tenderness  There is no guarding  Musculoskeletal:         General: Normal range of motion  Cervical back: Normal range of motion and neck supple  Skin:     General: Skin is warm and dry  Neurological:      General: No focal deficit present        Mental Status: He is alert and oriented to person, place, and time  Mental status is at baseline  Cranial Nerves: No cranial nerve deficit  Psychiatric:         Mood and Affect: Mood normal          Behavior: Behavior normal        BMI Counseling: Body mass index is 36 8 kg/m²  The BMI is above normal  Nutrition recommendations include reducing portion sizes, decreasing overall calorie intake and 3-5 servings of fruits/vegetables daily

## 2021-08-03 NOTE — PATIENT INSTRUCTIONS

## 2021-08-23 ENCOUNTER — APPOINTMENT (OUTPATIENT)
Dept: LAB | Facility: HOSPITAL | Age: 86
End: 2021-08-23
Attending: INTERNAL MEDICINE
Payer: MEDICARE

## 2021-08-23 DIAGNOSIS — D50.8 OTHER IRON DEFICIENCY ANEMIA: ICD-10-CM

## 2021-08-23 DIAGNOSIS — I10 ESSENTIAL HYPERTENSION: ICD-10-CM

## 2021-08-23 LAB
ALBUMIN SERPL BCP-MCNC: 3.6 G/DL (ref 3.5–5)
ALP SERPL-CCNC: 64 U/L (ref 46–116)
ALT SERPL W P-5'-P-CCNC: 16 U/L (ref 12–78)
ANION GAP SERPL CALCULATED.3IONS-SCNC: 5 MMOL/L (ref 4–13)
AST SERPL W P-5'-P-CCNC: 15 U/L (ref 5–45)
BACTERIA UR QL AUTO: ABNORMAL /HPF
BASOPHILS # BLD AUTO: 0.05 THOUSANDS/ΜL (ref 0–0.1)
BASOPHILS NFR BLD AUTO: 1 % (ref 0–1)
BILIRUB SERPL-MCNC: 0.65 MG/DL (ref 0.2–1)
BILIRUB UR QL STRIP: NEGATIVE
BUN SERPL-MCNC: 30 MG/DL (ref 5–25)
CALCIUM SERPL-MCNC: 8.5 MG/DL (ref 8.3–10.1)
CHLORIDE SERPL-SCNC: 105 MMOL/L (ref 100–108)
CLARITY UR: CLEAR
CO2 SERPL-SCNC: 28 MMOL/L (ref 21–32)
COLOR UR: YELLOW
CREAT SERPL-MCNC: 1.9 MG/DL (ref 0.6–1.3)
EOSINOPHIL # BLD AUTO: 0.18 THOUSAND/ΜL (ref 0–0.61)
EOSINOPHIL NFR BLD AUTO: 3 % (ref 0–6)
ERYTHROCYTE [DISTWIDTH] IN BLOOD BY AUTOMATED COUNT: 16.2 % (ref 11.6–15.1)
FERRITIN SERPL-MCNC: 276 NG/ML (ref 8–388)
GFR SERPL CREATININE-BSD FRML MDRD: 30 ML/MIN/1.73SQ M
GLUCOSE P FAST SERPL-MCNC: 101 MG/DL (ref 65–99)
GLUCOSE UR STRIP-MCNC: NEGATIVE MG/DL
HCT VFR BLD AUTO: 33.5 % (ref 36.5–49.3)
HGB BLD-MCNC: 11 G/DL (ref 12–17)
HGB UR QL STRIP.AUTO: NEGATIVE
HYALINE CASTS #/AREA URNS LPF: ABNORMAL /LPF
IMM GRANULOCYTES # BLD AUTO: 0.01 THOUSAND/UL (ref 0–0.2)
IMM GRANULOCYTES NFR BLD AUTO: 0 % (ref 0–2)
IRON SATN MFR SERPL: 36 %
IRON SERPL-MCNC: 87 UG/DL (ref 65–175)
KETONES UR STRIP-MCNC: NEGATIVE MG/DL
LEUKOCYTE ESTERASE UR QL STRIP: ABNORMAL
LYMPHOCYTES # BLD AUTO: 1.53 THOUSANDS/ΜL (ref 0.6–4.47)
LYMPHOCYTES NFR BLD AUTO: 28 % (ref 14–44)
MCH RBC QN AUTO: 34.1 PG (ref 26.8–34.3)
MCHC RBC AUTO-ENTMCNC: 32.8 G/DL (ref 31.4–37.4)
MCV RBC AUTO: 104 FL (ref 82–98)
MONOCYTES # BLD AUTO: 0.49 THOUSAND/ΜL (ref 0.17–1.22)
MONOCYTES NFR BLD AUTO: 9 % (ref 4–12)
NEUTROPHILS # BLD AUTO: 3.23 THOUSANDS/ΜL (ref 1.85–7.62)
NEUTS SEG NFR BLD AUTO: 59 % (ref 43–75)
NITRITE UR QL STRIP: NEGATIVE
NON-SQ EPI CELLS URNS QL MICRO: ABNORMAL /HPF
NRBC BLD AUTO-RTO: 0 /100 WBCS
PH UR STRIP.AUTO: 5.5 [PH]
PLATELET # BLD AUTO: 182 THOUSANDS/UL (ref 149–390)
PMV BLD AUTO: 10.6 FL (ref 8.9–12.7)
POTASSIUM SERPL-SCNC: 4.1 MMOL/L (ref 3.5–5.3)
PROT SERPL-MCNC: 7.3 G/DL (ref 6.4–8.2)
PROT UR STRIP-MCNC: NEGATIVE MG/DL
RBC # BLD AUTO: 3.23 MILLION/UL (ref 3.88–5.62)
RBC #/AREA URNS AUTO: ABNORMAL /HPF
SODIUM SERPL-SCNC: 138 MMOL/L (ref 136–145)
SP GR UR STRIP.AUTO: 1.01 (ref 1–1.03)
TIBC SERPL-MCNC: 244 UG/DL (ref 250–450)
UROBILINOGEN UR QL STRIP.AUTO: 0.2 E.U./DL
WBC # BLD AUTO: 5.49 THOUSAND/UL (ref 4.31–10.16)
WBC #/AREA URNS AUTO: ABNORMAL /HPF

## 2021-08-23 PROCEDURE — 85025 COMPLETE CBC W/AUTO DIFF WBC: CPT

## 2021-08-23 PROCEDURE — 81001 URINALYSIS AUTO W/SCOPE: CPT | Performed by: INTERNAL MEDICINE

## 2021-08-23 PROCEDURE — 80053 COMPREHEN METABOLIC PANEL: CPT

## 2021-08-23 PROCEDURE — 83540 ASSAY OF IRON: CPT

## 2021-08-23 PROCEDURE — 36415 COLL VENOUS BLD VENIPUNCTURE: CPT

## 2021-08-23 PROCEDURE — 82728 ASSAY OF FERRITIN: CPT

## 2021-08-23 PROCEDURE — 83550 IRON BINDING TEST: CPT

## 2021-08-27 DIAGNOSIS — I50.32 CHRONIC DIASTOLIC CONGESTIVE HEART FAILURE (HCC): ICD-10-CM

## 2021-08-30 RX ORDER — METOPROLOL SUCCINATE 25 MG/1
TABLET, EXTENDED RELEASE ORAL
Qty: 30 TABLET | Refills: 1 | Status: SHIPPED | OUTPATIENT
Start: 2021-08-30 | End: 2021-09-24

## 2021-09-24 DIAGNOSIS — I10 ESSENTIAL HYPERTENSION: ICD-10-CM

## 2021-09-24 DIAGNOSIS — E03.9 ACQUIRED HYPOTHYROIDISM: ICD-10-CM

## 2021-09-24 DIAGNOSIS — I50.32 CHRONIC DIASTOLIC CONGESTIVE HEART FAILURE (HCC): ICD-10-CM

## 2021-09-24 RX ORDER — LEVOTHYROXINE SODIUM 0.1 MG/1
TABLET ORAL
Qty: 30 TABLET | Refills: 2 | Status: SHIPPED | OUTPATIENT
Start: 2021-09-24 | End: 2021-12-27

## 2021-09-24 RX ORDER — METOPROLOL SUCCINATE 25 MG/1
TABLET, EXTENDED RELEASE ORAL
Qty: 30 TABLET | Refills: 1 | Status: SHIPPED | OUTPATIENT
Start: 2021-09-24 | End: 2021-11-22

## 2021-09-24 RX ORDER — AMLODIPINE BESYLATE 5 MG/1
TABLET ORAL
Qty: 30 TABLET | Refills: 2 | Status: SHIPPED | OUTPATIENT
Start: 2021-09-24 | End: 2021-12-27

## 2021-09-24 RX ORDER — FUROSEMIDE 40 MG/1
TABLET ORAL
Qty: 30 TABLET | Refills: 1 | Status: SHIPPED | OUTPATIENT
Start: 2021-09-24 | End: 2021-11-22

## 2021-10-26 ENCOUNTER — RA CDI HCC (OUTPATIENT)
Dept: OTHER | Facility: HOSPITAL | Age: 86
End: 2021-10-26

## 2021-11-02 ENCOUNTER — OFFICE VISIT (OUTPATIENT)
Dept: INTERNAL MEDICINE CLINIC | Facility: CLINIC | Age: 86
End: 2021-11-02
Payer: MEDICARE

## 2021-11-02 VITALS
WEIGHT: 210.2 LBS | HEART RATE: 84 BPM | TEMPERATURE: 97.4 F | BODY MASS INDEX: 33.78 KG/M2 | HEIGHT: 66 IN | SYSTOLIC BLOOD PRESSURE: 130 MMHG | DIASTOLIC BLOOD PRESSURE: 70 MMHG | OXYGEN SATURATION: 97 %

## 2021-11-02 DIAGNOSIS — Z23 FLU VACCINE NEED: ICD-10-CM

## 2021-11-02 DIAGNOSIS — E03.9 ACQUIRED HYPOTHYROIDISM: ICD-10-CM

## 2021-11-02 DIAGNOSIS — H25.9 AGE-RELATED CATARACT OF BOTH EYES, UNSPECIFIED AGE-RELATED CATARACT TYPE: ICD-10-CM

## 2021-11-02 DIAGNOSIS — I10 ESSENTIAL HYPERTENSION: ICD-10-CM

## 2021-11-02 DIAGNOSIS — I50.32 CHRONIC DIASTOLIC CONGESTIVE HEART FAILURE (HCC): ICD-10-CM

## 2021-11-02 DIAGNOSIS — H61.23 CERUMEN DEBRIS ON TYMPANIC MEMBRANE OF BOTH EARS: ICD-10-CM

## 2021-11-02 DIAGNOSIS — N18.4 CKD (CHRONIC KIDNEY DISEASE) STAGE 4, GFR 15-29 ML/MIN (HCC): ICD-10-CM

## 2021-11-02 PROCEDURE — 99214 OFFICE O/P EST MOD 30 MIN: CPT | Performed by: INTERNAL MEDICINE

## 2021-11-02 PROCEDURE — G0008 ADMIN INFLUENZA VIRUS VAC: HCPCS

## 2021-11-02 PROCEDURE — 90662 IIV NO PRSV INCREASED AG IM: CPT

## 2021-11-22 DIAGNOSIS — I50.32 CHRONIC DIASTOLIC CONGESTIVE HEART FAILURE (HCC): ICD-10-CM

## 2021-11-22 RX ORDER — METOPROLOL SUCCINATE 25 MG/1
TABLET, EXTENDED RELEASE ORAL
Qty: 30 TABLET | Refills: 1 | Status: SHIPPED | OUTPATIENT
Start: 2021-11-22 | End: 2022-01-27

## 2021-11-22 RX ORDER — FUROSEMIDE 40 MG/1
TABLET ORAL
Qty: 30 TABLET | Refills: 1 | Status: SHIPPED | OUTPATIENT
Start: 2021-11-22 | End: 2022-01-27

## 2021-11-26 ENCOUNTER — APPOINTMENT (OUTPATIENT)
Dept: LAB | Facility: HOSPITAL | Age: 86
End: 2021-11-26
Attending: INTERNAL MEDICINE
Payer: MEDICARE

## 2021-11-26 DIAGNOSIS — I10 ESSENTIAL HYPERTENSION: ICD-10-CM

## 2021-11-26 DIAGNOSIS — I50.32 CHRONIC DIASTOLIC CONGESTIVE HEART FAILURE (HCC): ICD-10-CM

## 2021-11-26 LAB
ALBUMIN SERPL BCP-MCNC: 3.5 G/DL (ref 3.5–5)
ALP SERPL-CCNC: 58 U/L (ref 46–116)
ALT SERPL W P-5'-P-CCNC: 15 U/L (ref 12–78)
ANION GAP SERPL CALCULATED.3IONS-SCNC: 6 MMOL/L (ref 4–13)
AST SERPL W P-5'-P-CCNC: 14 U/L (ref 5–45)
BACTERIA UR QL AUTO: NORMAL /HPF
BASOPHILS # BLD AUTO: 0.05 THOUSANDS/ΜL (ref 0–0.1)
BASOPHILS NFR BLD AUTO: 1 % (ref 0–1)
BILIRUB SERPL-MCNC: 0.86 MG/DL (ref 0.2–1)
BILIRUB UR QL STRIP: NEGATIVE
BUN SERPL-MCNC: 30 MG/DL (ref 5–25)
CALCIUM SERPL-MCNC: 9.1 MG/DL (ref 8.3–10.1)
CHLORIDE SERPL-SCNC: 105 MMOL/L (ref 100–108)
CLARITY UR: NORMAL
CO2 SERPL-SCNC: 27 MMOL/L (ref 21–32)
COLOR UR: YELLOW
CREAT SERPL-MCNC: 1.86 MG/DL (ref 0.6–1.3)
EOSINOPHIL # BLD AUTO: 0.18 THOUSAND/ΜL (ref 0–0.61)
EOSINOPHIL NFR BLD AUTO: 3 % (ref 0–6)
ERYTHROCYTE [DISTWIDTH] IN BLOOD BY AUTOMATED COUNT: 15.7 % (ref 11.6–15.1)
FERRITIN SERPL-MCNC: 289 NG/ML (ref 8–388)
GFR SERPL CREATININE-BSD FRML MDRD: 31 ML/MIN/1.73SQ M
GLUCOSE P FAST SERPL-MCNC: 101 MG/DL (ref 65–99)
GLUCOSE UR STRIP-MCNC: NEGATIVE MG/DL
HCT VFR BLD AUTO: 33.3 % (ref 36.5–49.3)
HGB BLD-MCNC: 10.4 G/DL (ref 12–17)
HGB UR QL STRIP.AUTO: NEGATIVE
HYALINE CASTS #/AREA URNS LPF: NORMAL /LPF
IMM GRANULOCYTES # BLD AUTO: 0.02 THOUSAND/UL (ref 0–0.2)
IMM GRANULOCYTES NFR BLD AUTO: 0 % (ref 0–2)
KETONES UR STRIP-MCNC: NEGATIVE MG/DL
LEUKOCYTE ESTERASE UR QL STRIP: NEGATIVE
LYMPHOCYTES # BLD AUTO: 1.52 THOUSANDS/ΜL (ref 0.6–4.47)
LYMPHOCYTES NFR BLD AUTO: 25 % (ref 14–44)
MCH RBC QN AUTO: 32.7 PG (ref 26.8–34.3)
MCHC RBC AUTO-ENTMCNC: 31.2 G/DL (ref 31.4–37.4)
MCV RBC AUTO: 105 FL (ref 82–98)
MONOCYTES # BLD AUTO: 0.56 THOUSAND/ΜL (ref 0.17–1.22)
MONOCYTES NFR BLD AUTO: 9 % (ref 4–12)
NEUTROPHILS # BLD AUTO: 3.75 THOUSANDS/ΜL (ref 1.85–7.62)
NEUTS SEG NFR BLD AUTO: 62 % (ref 43–75)
NITRITE UR QL STRIP: NEGATIVE
NON-SQ EPI CELLS URNS QL MICRO: NORMAL /HPF
NRBC BLD AUTO-RTO: 0 /100 WBCS
PH UR STRIP.AUTO: 5.5 [PH]
PLATELET # BLD AUTO: 189 THOUSANDS/UL (ref 149–390)
PMV BLD AUTO: 11.1 FL (ref 8.9–12.7)
POTASSIUM SERPL-SCNC: 4.2 MMOL/L (ref 3.5–5.3)
PROT SERPL-MCNC: 7.2 G/DL (ref 6.4–8.2)
PROT UR STRIP-MCNC: NEGATIVE MG/DL
RBC # BLD AUTO: 3.18 MILLION/UL (ref 3.88–5.62)
RBC #/AREA URNS AUTO: NORMAL /HPF
SODIUM SERPL-SCNC: 138 MMOL/L (ref 136–145)
SP GR UR STRIP.AUTO: 1.01 (ref 1–1.03)
UROBILINOGEN UR QL STRIP.AUTO: 0.2 E.U./DL
WBC # BLD AUTO: 6.08 THOUSAND/UL (ref 4.31–10.16)
WBC #/AREA URNS AUTO: NORMAL /HPF

## 2021-11-26 PROCEDURE — 36415 COLL VENOUS BLD VENIPUNCTURE: CPT

## 2021-11-26 PROCEDURE — 85025 COMPLETE CBC W/AUTO DIFF WBC: CPT

## 2021-11-26 PROCEDURE — 82728 ASSAY OF FERRITIN: CPT

## 2021-11-26 PROCEDURE — 80053 COMPREHEN METABOLIC PANEL: CPT

## 2021-11-26 PROCEDURE — 81001 URINALYSIS AUTO W/SCOPE: CPT | Performed by: INTERNAL MEDICINE

## 2021-12-24 DIAGNOSIS — I10 ESSENTIAL HYPERTENSION: ICD-10-CM

## 2021-12-24 DIAGNOSIS — E03.9 ACQUIRED HYPOTHYROIDISM: ICD-10-CM

## 2021-12-27 RX ORDER — AMLODIPINE BESYLATE 5 MG/1
TABLET ORAL
Qty: 30 TABLET | Refills: 2 | Status: SHIPPED | OUTPATIENT
Start: 2021-12-27 | End: 2022-03-30

## 2021-12-27 RX ORDER — LEVOTHYROXINE SODIUM 0.1 MG/1
TABLET ORAL
Qty: 30 TABLET | Refills: 2 | Status: SHIPPED | OUTPATIENT
Start: 2021-12-27 | End: 2022-03-30

## 2022-01-27 DIAGNOSIS — I50.32 CHRONIC DIASTOLIC CONGESTIVE HEART FAILURE (HCC): ICD-10-CM

## 2022-01-27 RX ORDER — FUROSEMIDE 40 MG/1
TABLET ORAL
Qty: 30 TABLET | Refills: 1 | Status: SHIPPED | OUTPATIENT
Start: 2022-01-27 | End: 2022-03-30

## 2022-01-27 RX ORDER — METOPROLOL SUCCINATE 25 MG/1
TABLET, EXTENDED RELEASE ORAL
Qty: 30 TABLET | Refills: 1 | Status: SHIPPED | OUTPATIENT
Start: 2022-01-27 | End: 2022-03-30

## 2022-03-01 ENCOUNTER — RA CDI HCC (OUTPATIENT)
Dept: OTHER | Facility: HOSPITAL | Age: 87
End: 2022-03-01

## 2022-03-01 NOTE — PROGRESS NOTES
[facility]  coding opportunities             Chart Reviewed * (Number of) Inbasket suggestions sent to Provider: 1   [id number]    Appt:3/8/2022               Patients insurance company: [employer]

## 2022-04-19 ENCOUNTER — OFFICE VISIT (OUTPATIENT)
Dept: INTERNAL MEDICINE CLINIC | Facility: CLINIC | Age: 87
End: 2022-04-19
Payer: MEDICARE

## 2022-04-19 VITALS
DIASTOLIC BLOOD PRESSURE: 68 MMHG | SYSTOLIC BLOOD PRESSURE: 124 MMHG | WEIGHT: 217 LBS | HEIGHT: 66 IN | TEMPERATURE: 99.1 F | OXYGEN SATURATION: 98 % | HEART RATE: 81 BPM | BODY MASS INDEX: 34.87 KG/M2

## 2022-04-19 DIAGNOSIS — I87.8 VENOUS STASIS: ICD-10-CM

## 2022-04-19 DIAGNOSIS — I50.32 CHRONIC DIASTOLIC CONGESTIVE HEART FAILURE (HCC): Primary | ICD-10-CM

## 2022-04-19 DIAGNOSIS — R73.01 IMPAIRED FASTING BLOOD SUGAR: ICD-10-CM

## 2022-04-19 DIAGNOSIS — R54 FRAILTY SYNDROME IN GERIATRIC PATIENT: ICD-10-CM

## 2022-04-19 DIAGNOSIS — M1A.9XX0 CHRONIC GOUT WITHOUT TOPHUS, UNSPECIFIED CAUSE, UNSPECIFIED SITE: ICD-10-CM

## 2022-04-19 DIAGNOSIS — R26.9 GAIT ABNORMALITY: ICD-10-CM

## 2022-04-19 DIAGNOSIS — E66.01 OBESITY, MORBID (HCC): ICD-10-CM

## 2022-04-19 DIAGNOSIS — E03.9 ACQUIRED HYPOTHYROIDISM: ICD-10-CM

## 2022-04-19 DIAGNOSIS — I10 ESSENTIAL HYPERTENSION: ICD-10-CM

## 2022-04-19 DIAGNOSIS — N18.4 CKD (CHRONIC KIDNEY DISEASE) STAGE 4, GFR 15-29 ML/MIN (HCC): ICD-10-CM

## 2022-04-19 PROCEDURE — 99214 OFFICE O/P EST MOD 30 MIN: CPT | Performed by: INTERNAL MEDICINE

## 2022-04-19 NOTE — PROGRESS NOTES
Assessment/Plan:           1  Chronic diastolic congestive heart failure ([facility] [address] )  Comments:  He is taking his diuretic regularly but still sleeps in a recliner and cannot sleep the bed  Sleeping on the bed is recommended  Orders:  -     NT-BNP PRO; Future  -     Microalbumin / creatinine urine ratio    2  Essential hypertension  Comments:  Continue amlodipine furosemide and metoprolol  Orders:  -     CBC and differential; Future  -     Comprehensive metabolic panel; Future    3  Acquired hypothyroidism  Comments:  Continue levothyroxine  Labs were reviewed  Orders:  -     TSH + Free T4; Future    4  CKD (chronic kidney disease) stage 4, GFR 15-29 ml/min ([facility])  Comments:  Avoidance of nephrotoxic agents discussed  5  Gait abnormality    6  Obesity, morbid ([facility] )  Comments:  He has gained some weight and is eating at fast food restaurant  Weight loss is recommended  7  Impaired fasting blood sugar  Comments:  A1c was ordered and diabetic diet discussed  Orders:  -     Hemoglobin A1C; Future    8  Chronic gout without tophus, unspecified cause, unspecified site  Comments:  Continue allopurinol  Orders:  -     Uric acid; Future    9  Venous stasis    10  Frailty syndrome in geriatric patient  Comments:  Patient has declined social service help  1  Essential hypertension    - CBC and differential; Future  - Comprehensive metabolic panel; Future    2  Acquired hypothyroidism    - TSH + Free T4; Future    3  Chronic diastolic congestive heart failure (HCC)    - NT-BNP PRO; Future  - Microalbumin / creatinine urine ratio    4  CKD (chronic kidney disease) stage 4, GFR 15-29 ml/min ([facility])      5  Gait abnormality      6  Obesity, morbid ([facility] )      7  Impaired fasting blood sugar    - Hemoglobin A1C; Future       No problem-specific Assessment & Plan notes found for this encounter  Subjective:      Patient ID: Jc Arguello is a 80 y o  male      HPI    The following portions of the patient's history were reviewed and updated as appropriate: He  has a past medical history of VAN (acute kidney injury) ([place] [address] ), Atrial flutter ([place] [address] ), CHF (congestive heart failure) ([place] [address] ), CKD (chronic kidney disease) stage 3, GFR 30-59 ml/min ([place] [address] ), Hypertension, Obstructed, uropathy, Sepsis ([place] [address] ), and Venous stasis  He   Patient Active Problem List    Diagnosis Date Noted    Venous stasis 04/19/2022    Frailty syndrome in geriatric patient 04/19/2022    CKD (chronic kidney disease) stage 4, GFR 15-29 ml/min ([facility]) 05/03/2021    Obesity (BMI 30-39 9) 03/15/2021    Chronic gout without tophus 03/15/2021    Impaired fasting blood sugar 03/15/2021    Venous stasis dermatitis of both lower extremities 03/15/2021    Obesity, morbid ([place] [address] ) 03/15/2021    CHF (congestive heart failure) ([facility])     PVC (premature ventricular contraction) 08/05/2019    Chronic diastolic congestive heart failure ([place] [address] ) 06/14/2017    Leukocytosis 06/14/2017    Venous stasis ulcer ([place] [address] ) 06/14/2017    Stage 3b chronic kidney disease ([place] [address] ) 06/14/2017    Sepsis ([place] [address] ) 06/13/2017    Cellulitis of right lower extremity 06/13/2017    Lower extremity edema 06/13/2017    Essential hypertension 06/13/2017    Acquired hypothyroidism 06/13/2017     He  has a past surgical history that includes Prostate surgery; Transurethral resection of prostate; and Tonsillectomy  His family history is not on file  He  reports that he has never smoked  He has never used smokeless tobacco  He reports that he does not drink alcohol and does not use drugs    Current Outpatient Medications   Medication Sig Dispense Refill    allopurinol (ZYLOPRIM) 100 mg tablet TAKE 1 TAB TWICE A DAY 60 tablet 1    amLODIPine (NORVASC) 5 mg tablet TAKE 1 TABLET DAILY 30 tablet 0    carbamide peroxide (DEBROX) 6 5 % otic solution Administer 5 drops into both ears 2 (two) times a day 15 mL 0    cholecalciferol (VITAMIN D3) 1,000 units tablet Take 1,000 Units by mouth daily      ferrous sulfate 325 (65 Fe) mg tablet Take 325 mg by mouth daily with breakfast      furosemide (LASIX) 40 mg tablet TAKE 1 TABLET DAILY 30 tablet 0    levothyroxine 100 mcg tablet TAKE 1 TABLET DAILY 30 tablet 0    metoprolol succinate (TOPROL-XL) 25 mg 24 hr tablet TAKE 1 TABLET DAILY 30 tablet 0    neomycin-bacitracin-polymyxin (NEOSPORIN) 5-400-5,000 ointment Apply topically 3 (three) times a day for 5 days (Patient not taking: Reported on 1/13/2021) 28 3 g 0     No current facility-administered medications for this visit  Current Outpatient Medications on File Prior to Visit   Medication Sig    allopurinol (ZYLOPRIM) 100 mg tablet TAKE 1 TAB TWICE A DAY    amLODIPine (NORVASC) 5 mg tablet TAKE 1 TABLET DAILY    carbamide peroxide (DEBROX) 6 5 % otic solution Administer 5 drops into both ears 2 (two) times a day    cholecalciferol (VITAMIN D3) 1,000 units tablet Take 1,000 Units by mouth daily    ferrous sulfate 325 (65 Fe) mg tablet Take 325 mg by mouth daily with breakfast    furosemide (LASIX) 40 mg tablet TAKE 1 TABLET DAILY    levothyroxine 100 mcg tablet TAKE 1 TABLET DAILY    metoprolol succinate (TOPROL-XL) 25 mg 24 hr tablet TAKE 1 TABLET DAILY    neomycin-bacitracin-polymyxin (NEOSPORIN) 5-400-5,000 ointment Apply topically 3 (three) times a day for 5 days (Patient not taking: Reported on 1/13/2021)     No current facility-administered medications on file prior to visit  He has No Known Allergies       Review of Systems   Constitutional: Negative for appetite change, chills, fatigue and fever  HENT: Negative for sore throat and trouble swallowing  Eyes: Negative for redness  Respiratory: Negative for shortness of breath  Cardiovascular: Negative for chest pain and palpitations  Gastrointestinal: Negative for abdominal pain, constipation and diarrhea  Genitourinary: Negative for dysuria and hematuria  Musculoskeletal: Positive for gait problem   Negative for back pain and neck pain    Skin: Negative for rash  Neurological: Negative for seizures, weakness and headaches  Hematological: Negative for adenopathy  Psychiatric/Behavioral: Negative for confusion  The patient is not nervous/anxious  Objective:      /68 (BP Location: Right arm, Patient Position: Sitting, Cuff Size: Standard)   Pulse 81   Temp 99 1 °F (37 3 °C) (Temporal)   Ht 5' 6" (1 676 m)   Wt 98 4 kg (217 lb)   SpO2 98%   BMI 35 02 kg/m²     Results Reviewed     None          No results found for this or any previous visit (from the past 1344 hour(s))  Physical Exam  Constitutional:       General: He is not in acute distress  Appearance: Normal appearance  HENT:      Head: Normocephalic and atraumatic  Right Ear: Tympanic membrane normal       Nose: Nose normal       Mouth/Throat:      Mouth: Mucous membranes are moist    Eyes:      Extraocular Movements: Extraocular movements intact  Pupils: Pupils are equal, round, and reactive to light  Cardiovascular:      Rate and Rhythm: Normal rate and regular rhythm  Pulses: Normal pulses  Heart sounds: Normal heart sounds  No murmur heard  No friction rub  Pulmonary:      Effort: Pulmonary effort is normal  No respiratory distress  Breath sounds: Normal breath sounds  No wheezing  Abdominal:      General: Abdomen is flat  Bowel sounds are normal  There is no distension  Palpations: Abdomen is soft  There is no mass  Tenderness: There is no abdominal tenderness  There is no guarding  Musculoskeletal:         General: Normal range of motion  Cervical back: Normal range of motion  Right lower leg: Edema present  Left lower leg: Edema present  Skin:     General: Skin is warm and dry  Neurological:      General: No focal deficit present  Mental Status: He is alert and oriented to person, place, and time  Mental status is at baseline  Cranial Nerves: No cranial nerve deficit  Gait: Gait abnormal    Psychiatric:         Mood and Affect: Mood normal          Behavior: Behavior normal        BMI Counseling: Body mass index is 35 02 kg/m²  The BMI is above normal  Nutrition recommendations include reducing portion sizes

## 2022-04-28 DIAGNOSIS — E03.9 ACQUIRED HYPOTHYROIDISM: ICD-10-CM

## 2022-04-28 DIAGNOSIS — H61.23 CERUMEN DEBRIS ON TYMPANIC MEMBRANE OF BOTH EARS: ICD-10-CM

## 2022-04-28 DIAGNOSIS — I10 ESSENTIAL HYPERTENSION: ICD-10-CM

## 2022-04-28 DIAGNOSIS — E55.9 VITAMIN D DEFICIENCY: Primary | ICD-10-CM

## 2022-04-28 DIAGNOSIS — M1A.9XX0 CHRONIC GOUT WITHOUT TOPHUS, UNSPECIFIED CAUSE, UNSPECIFIED SITE: ICD-10-CM

## 2022-04-28 DIAGNOSIS — I50.32 CHRONIC DIASTOLIC CONGESTIVE HEART FAILURE (HCC): ICD-10-CM

## 2022-04-28 DIAGNOSIS — E61.1 IRON DEFICIENCY: ICD-10-CM

## 2022-04-29 RX ORDER — FUROSEMIDE 40 MG/1
40 TABLET ORAL DAILY
Qty: 30 TABLET | Refills: 2 | Status: SHIPPED | OUTPATIENT
Start: 2022-04-29 | End: 2022-07-28 | Stop reason: SDUPTHER

## 2022-04-29 RX ORDER — AMLODIPINE BESYLATE 5 MG/1
5 TABLET ORAL DAILY
Qty: 30 TABLET | Refills: 2 | Status: SHIPPED | OUTPATIENT
Start: 2022-04-29 | End: 2022-07-28 | Stop reason: SDUPTHER

## 2022-04-29 RX ORDER — ALLOPURINOL 100 MG/1
100 TABLET ORAL 2 TIMES DAILY
Qty: 60 TABLET | Refills: 2 | Status: SHIPPED | OUTPATIENT
Start: 2022-04-29

## 2022-04-29 RX ORDER — FERROUS SULFATE 325(65) MG
325 TABLET ORAL
Qty: 30 TABLET | Refills: 2 | Status: SHIPPED | OUTPATIENT
Start: 2022-04-29

## 2022-04-29 RX ORDER — MELATONIN
1000 DAILY
Qty: 100 TABLET | Refills: 3 | Status: SHIPPED | OUTPATIENT
Start: 2022-04-29

## 2022-04-29 RX ORDER — METOPROLOL SUCCINATE 25 MG/1
25 TABLET, EXTENDED RELEASE ORAL DAILY
Qty: 30 TABLET | Refills: 0 | Status: SHIPPED | OUTPATIENT
Start: 2022-04-29 | End: 2022-07-28 | Stop reason: SDUPTHER

## 2022-04-29 RX ORDER — LEVOTHYROXINE SODIUM 0.1 MG/1
100 TABLET ORAL DAILY
Qty: 30 TABLET | Refills: 0 | Status: SHIPPED | OUTPATIENT
Start: 2022-04-29 | End: 2022-07-28 | Stop reason: SDUPTHER

## 2022-04-29 NOTE — TELEPHONE ENCOUNTER
Patient called following up on this  He is now out of pills and needs the medications filled by noon today

## 2022-05-31 ENCOUNTER — APPOINTMENT (OUTPATIENT)
Dept: LAB | Facility: HOSPITAL | Age: 87
End: 2022-05-31
Attending: INTERNAL MEDICINE
Payer: MEDICARE

## 2022-05-31 DIAGNOSIS — R73.01 IMPAIRED FASTING BLOOD SUGAR: ICD-10-CM

## 2022-05-31 DIAGNOSIS — M1A.9XX0 CHRONIC GOUT WITHOUT TOPHUS, UNSPECIFIED CAUSE, UNSPECIFIED SITE: ICD-10-CM

## 2022-05-31 DIAGNOSIS — I50.32 CHRONIC DIASTOLIC CONGESTIVE HEART FAILURE (HCC): ICD-10-CM

## 2022-05-31 DIAGNOSIS — E03.9 ACQUIRED HYPOTHYROIDISM: ICD-10-CM

## 2022-05-31 DIAGNOSIS — I10 ESSENTIAL HYPERTENSION: ICD-10-CM

## 2022-05-31 LAB
ALBUMIN SERPL BCP-MCNC: 3.4 G/DL (ref 3.5–5)
ALP SERPL-CCNC: 60 U/L (ref 46–116)
ALT SERPL W P-5'-P-CCNC: 15 U/L (ref 12–78)
ANION GAP SERPL CALCULATED.3IONS-SCNC: 3 MMOL/L (ref 4–13)
AST SERPL W P-5'-P-CCNC: 14 U/L (ref 5–45)
BASOPHILS # BLD AUTO: 0.05 THOUSANDS/ΜL (ref 0–0.1)
BASOPHILS NFR BLD AUTO: 1 % (ref 0–1)
BILIRUB SERPL-MCNC: 1.14 MG/DL (ref 0.2–1)
BUN SERPL-MCNC: 28 MG/DL (ref 5–25)
CALCIUM ALBUM COR SERPL-MCNC: 9.4 MG/DL (ref 8.3–10.1)
CALCIUM SERPL-MCNC: 8.9 MG/DL (ref 8.3–10.1)
CHLORIDE SERPL-SCNC: 107 MMOL/L (ref 100–108)
CO2 SERPL-SCNC: 31 MMOL/L (ref 21–32)
CREAT SERPL-MCNC: 2.05 MG/DL (ref 0.6–1.3)
CREAT UR-MCNC: 137 MG/DL
EOSINOPHIL # BLD AUTO: 0.22 THOUSAND/ΜL (ref 0–0.61)
EOSINOPHIL NFR BLD AUTO: 4 % (ref 0–6)
ERYTHROCYTE [DISTWIDTH] IN BLOOD BY AUTOMATED COUNT: 15.9 % (ref 11.6–15.1)
EST. AVERAGE GLUCOSE BLD GHB EST-MCNC: 108 MG/DL
GFR SERPL CREATININE-BSD FRML MDRD: 27 ML/MIN/1.73SQ M
GLUCOSE P FAST SERPL-MCNC: 99 MG/DL (ref 65–99)
HBA1C MFR BLD: 5.4 %
HCT VFR BLD AUTO: 33 % (ref 36.5–49.3)
HGB BLD-MCNC: 10.6 G/DL (ref 12–17)
IMM GRANULOCYTES # BLD AUTO: 0.02 THOUSAND/UL (ref 0–0.2)
IMM GRANULOCYTES NFR BLD AUTO: 0 % (ref 0–2)
LYMPHOCYTES # BLD AUTO: 1.57 THOUSANDS/ΜL (ref 0.6–4.47)
LYMPHOCYTES NFR BLD AUTO: 27 % (ref 14–44)
MCH RBC QN AUTO: 33.2 PG (ref 26.8–34.3)
MCHC RBC AUTO-ENTMCNC: 32.1 G/DL (ref 31.4–37.4)
MCV RBC AUTO: 103 FL (ref 82–98)
MICROALBUMIN UR-MCNC: 50 MG/L (ref 0–20)
MICROALBUMIN/CREAT 24H UR: 36 MG/G CREATININE (ref 0–30)
MONOCYTES # BLD AUTO: 0.51 THOUSAND/ΜL (ref 0.17–1.22)
MONOCYTES NFR BLD AUTO: 9 % (ref 4–12)
NEUTROPHILS # BLD AUTO: 3.4 THOUSANDS/ΜL (ref 1.85–7.62)
NEUTS SEG NFR BLD AUTO: 59 % (ref 43–75)
NRBC BLD AUTO-RTO: 0 /100 WBCS
NT-PROBNP SERPL-MCNC: 2664 PG/ML
PLATELET # BLD AUTO: 162 THOUSANDS/UL (ref 149–390)
PMV BLD AUTO: 11.1 FL (ref 8.9–12.7)
POTASSIUM SERPL-SCNC: 4.4 MMOL/L (ref 3.5–5.3)
PROT SERPL-MCNC: 7.2 G/DL (ref 6.4–8.2)
RBC # BLD AUTO: 3.19 MILLION/UL (ref 3.88–5.62)
SODIUM SERPL-SCNC: 141 MMOL/L (ref 136–145)
T4 FREE SERPL-MCNC: 0.7 NG/DL (ref 0.76–1.46)
TSH SERPL DL<=0.05 MIU/L-ACNC: 8.46 UIU/ML (ref 0.45–4.5)
URATE SERPL-MCNC: 6.7 MG/DL (ref 4.2–8)
WBC # BLD AUTO: 5.77 THOUSAND/UL (ref 4.31–10.16)

## 2022-05-31 PROCEDURE — 83880 ASSAY OF NATRIURETIC PEPTIDE: CPT

## 2022-05-31 PROCEDURE — 82570 ASSAY OF URINE CREATININE: CPT | Performed by: INTERNAL MEDICINE

## 2022-05-31 PROCEDURE — 80053 COMPREHEN METABOLIC PANEL: CPT

## 2022-05-31 PROCEDURE — 84439 ASSAY OF FREE THYROXINE: CPT

## 2022-05-31 PROCEDURE — 83036 HEMOGLOBIN GLYCOSYLATED A1C: CPT

## 2022-05-31 PROCEDURE — 84443 ASSAY THYROID STIM HORMONE: CPT

## 2022-05-31 PROCEDURE — 85025 COMPLETE CBC W/AUTO DIFF WBC: CPT

## 2022-05-31 PROCEDURE — 36415 COLL VENOUS BLD VENIPUNCTURE: CPT

## 2022-05-31 PROCEDURE — 84550 ASSAY OF BLOOD/URIC ACID: CPT

## 2022-05-31 PROCEDURE — 82043 UR ALBUMIN QUANTITATIVE: CPT | Performed by: INTERNAL MEDICINE

## 2022-06-14 ENCOUNTER — OFFICE VISIT (OUTPATIENT)
Dept: INTERNAL MEDICINE CLINIC | Facility: CLINIC | Age: 87
End: 2022-06-14
Payer: MEDICARE

## 2022-06-14 VITALS
TEMPERATURE: 99 F | SYSTOLIC BLOOD PRESSURE: 122 MMHG | DIASTOLIC BLOOD PRESSURE: 78 MMHG | OXYGEN SATURATION: 96 % | BODY MASS INDEX: 35.93 KG/M2 | WEIGHT: 223.6 LBS | HEIGHT: 66 IN | HEART RATE: 90 BPM

## 2022-06-14 DIAGNOSIS — N18.4 CKD (CHRONIC KIDNEY DISEASE) STAGE 4, GFR 15-29 ML/MIN (HCC): ICD-10-CM

## 2022-06-14 DIAGNOSIS — R26.9 GAIT ABNORMALITY: ICD-10-CM

## 2022-06-14 DIAGNOSIS — M1A.9XX0 CHRONIC GOUT WITHOUT TOPHUS, UNSPECIFIED CAUSE, UNSPECIFIED SITE: ICD-10-CM

## 2022-06-14 DIAGNOSIS — I50.32 CHRONIC DIASTOLIC CONGESTIVE HEART FAILURE (HCC): Primary | ICD-10-CM

## 2022-06-14 DIAGNOSIS — E03.9 ACQUIRED HYPOTHYROIDISM: ICD-10-CM

## 2022-06-14 DIAGNOSIS — E66.9 OBESITY (BMI 30-39.9): ICD-10-CM

## 2022-06-14 DIAGNOSIS — I10 ESSENTIAL HYPERTENSION: ICD-10-CM

## 2022-06-14 PROCEDURE — 99214 OFFICE O/P EST MOD 30 MIN: CPT | Performed by: INTERNAL MEDICINE

## 2022-06-14 NOTE — PROGRESS NOTES
Assessment/Plan: This is a 80-year-old gentleman with a history of congestive heart failure atrial flutter chronic kidney disease hypertension gout obstructive uropathy venous stasis obesity  He lives with his wife who has Alzheimer's dementia and takes care of her  He does have a friend who comes and helps him with mowing his lawn which is a [place] [address]  He denies any chest pain at this time  1  Chronic diastolic congestive heart failure (HCC)  Comments:  Continue Lasix 40 mg daily  Lab work reviewed  2  Essential hypertension  Comments:  Continue amlodipine 5 mg daily  3  Chronic gout without tophus, unspecified cause, unspecified site    4  CKD (chronic kidney disease) stage 4, GFR 15-29 ml/min ([facility])  Comments:  He is close to his baseline  Continue to avoid nephrotoxic agents  Orders:  -     Basic metabolic panel; Future    5  Gait abnormality  Comments:  Patient remains at high fall risk  6  Acquired hypothyroidism  Comments:  Continue levothyroxine  7  Obesity (BMI 30-39  9)  Comments:  Low-calorie diet and weight loss advised  1  Chronic diastolic congestive heart failure ([address] )      2  Essential hypertension      3  Chronic gout without tophus, unspecified cause, unspecified site         No problem-specific Assessment & Plan notes found for this encounter  Subjective:      Patient ID: Pillo Watts is a 80 y o  male  This is a 80-year-old gentleman with a history of congestive heart failure atrial flutter chronic kidney disease hypertension gout obstructive uropathy venous stasis obesity  He lives with his wife who has Alzheimer's dementia and takes care of her  He does have a friend who comes and helps him with mowing his lawn which is a [place] [address]  He denies any chest pain at this time        The following portions of the patient's history were reviewed and updated as appropriate: He  has a past medical history of VAN (acute kidney injury) ([address] ), Atrial flutter ([facility] ), CHF (congestive heart failure) ([place] [address] ), CKD (chronic kidney disease) stage 3, GFR 30-59 ml/min ([place] [address] ), Hypertension, Obstructed, uropathy, Sepsis ([place] [address] ), and Venous stasis  He   Patient Active Problem List    Diagnosis Date Noted    Venous stasis 04/19/2022    Frailty syndrome in geriatric patient 04/19/2022    CKD (chronic kidney disease) stage 4, GFR 15-29 ml/min ([facility]) 05/03/2021    Obesity (BMI 30-39 9) 03/15/2021    Chronic gout without tophus 03/15/2021    Impaired fasting blood sugar 03/15/2021    Venous stasis dermatitis of both lower extremities 03/15/2021    Obesity, morbid ([place] [address] ) 03/15/2021    CHF (congestive heart failure) ([facility])     PVC (premature ventricular contraction) 08/05/2019    Chronic diastolic congestive heart failure ([place] [address] ) 06/14/2017    Leukocytosis 06/14/2017    Venous stasis ulcer ([place] [address] ) 06/14/2017    Stage 3b chronic kidney disease ([place] [address] ) 06/14/2017    Sepsis ([place] [address] ) 06/13/2017    Cellulitis of right lower extremity 06/13/2017    Lower extremity edema 06/13/2017    Essential hypertension 06/13/2017    Acquired hypothyroidism 06/13/2017     He  has a past surgical history that includes Prostate surgery; Transurethral resection of prostate; and Tonsillectomy  His family history is not on file  He  reports that he has never smoked  He has never used smokeless tobacco  He reports that he does not drink alcohol and does not use drugs    Current Outpatient Medications   Medication Sig Dispense Refill    allopurinol (ZYLOPRIM) 100 mg tablet Take 1 tablet (100 mg total) by mouth 2 (two) times a day 60 tablet 2    amLODIPine (NORVASC) 5 mg tablet Take 1 tablet (5 mg total) by mouth daily 30 tablet 2    carbamide peroxide (DEBROX) 6 5 % otic solution Administer 5 drops into both ears 2 (two) times a day 15 mL 0    cholecalciferol (VITAMIN D3) 1,000 units tablet Take 1 tablet (1,000 Units total) by mouth daily 100 tablet 3    ferrous sulfate 325 (65 Fe) mg tablet Take 1 tablet (325 mg total) by mouth daily with breakfast 30 tablet 2    furosemide (LASIX) 40 mg tablet Take 1 tablet (40 mg total) by mouth daily 30 tablet 2    levothyroxine 100 mcg tablet Take 1 tablet (100 mcg total) by mouth daily 30 tablet 0    metoprolol succinate (TOPROL-XL) 25 mg 24 hr tablet Take 1 tablet (25 mg total) by mouth daily 30 tablet 0    neomycin-bacitracin-polymyxin (NEOSPORIN) 5-400-5,000 ointment Apply topically 3 (three) times a day for 5 days (Patient not taking: Reported on 1/13/2021) 28 3 g 0     No current facility-administered medications for this visit  Current Outpatient Medications on File Prior to Visit   Medication Sig    allopurinol (ZYLOPRIM) 100 mg tablet Take 1 tablet (100 mg total) by mouth 2 (two) times a day    amLODIPine (NORVASC) 5 mg tablet Take 1 tablet (5 mg total) by mouth daily    carbamide peroxide (DEBROX) 6 5 % otic solution Administer 5 drops into both ears 2 (two) times a day    cholecalciferol (VITAMIN D3) 1,000 units tablet Take 1 tablet (1,000 Units total) by mouth daily    ferrous sulfate 325 (65 Fe) mg tablet Take 1 tablet (325 mg total) by mouth daily with breakfast    furosemide (LASIX) 40 mg tablet Take 1 tablet (40 mg total) by mouth daily    levothyroxine 100 mcg tablet Take 1 tablet (100 mcg total) by mouth daily    metoprolol succinate (TOPROL-XL) 25 mg 24 hr tablet Take 1 tablet (25 mg total) by mouth daily    neomycin-bacitracin-polymyxin (NEOSPORIN) 5-400-5,000 ointment Apply topically 3 (three) times a day for 5 days (Patient not taking: Reported on 1/13/2021)     No current facility-administered medications on file prior to visit  He has No Known Allergies       Review of Systems   Constitutional: Negative for appetite change, chills, fatigue and fever  HENT: Negative for sore throat and trouble swallowing  Eyes: Negative for redness  Respiratory: Negative for shortness of breath      Cardiovascular: Negative for chest pain and palpitations  Gastrointestinal: Negative for abdominal pain, constipation and diarrhea  Genitourinary: Negative for dysuria and hematuria  Musculoskeletal: Positive for gait problem  Negative for back pain and neck pain  Skin: Negative for rash  Neurological: Negative for seizures, weakness and headaches  Hematological: Negative for adenopathy  Psychiatric/Behavioral: Negative for confusion  The patient is not nervous/anxious  Objective:      /78 (BP Location: Left arm, Patient Position: Sitting, Cuff Size: Adult)   Pulse 90   Temp 99 °F (37 2 °C)   Ht 5' 6" (1 676 m)   Wt 101 kg (223 lb 9 6 oz)   SpO2 96%   BMI 36 09 kg/m²     Results Reviewed     None          Recent Results (from the past 1344 hour(s))   Microalbumin / creatinine urine ratio    Collection Time: 05/31/22  8:01 AM   Result Value Ref Range    Creatinine, Ur 137 0 mg/dL    Microalbum  ,U,Random 50 0 (H) 0 0 - 20 0 mg/L    Microalb Creat Ratio 36 (H) 0 - 30 mg/g creatinine   CBC and differential    Collection Time: 05/31/22  8:01 AM   Result Value Ref Range    WBC 5 77 4 31 - 10 16 Thousand/uL    RBC 3 19 (L) 3 88 - 5 62 Million/uL    Hemoglobin 10 6 (L) 12 0 - 17 0 g/dL    Hematocrit 33 0 (L) 36 5 - 49 3 %     (H) 82 - 98 fL    MCH 33 2 26 8 - 34 3 pg    MCHC 32 1 31 4 - 37 4 g/dL    RDW 15 9 (H) 11 6 - 15 1 %    MPV 11 1 8 9 - 12 7 fL    Platelets 433 310 - 567 Thousands/uL    nRBC 0 /100 WBCs    Neutrophils Relative 59 43 - 75 %    Immat GRANS % 0 0 - 2 %    Lymphocytes Relative 27 14 - 44 %    Monocytes Relative 9 4 - 12 %    Eosinophils Relative 4 0 - 6 %    Basophils Relative 1 0 - 1 %    Neutrophils Absolute 3 40 1 85 - 7 62 Thousands/µL    Immature Grans Absolute 0 02 0 00 - 0 20 Thousand/uL    Lymphocytes Absolute 1 57 0 60 - 4 47 Thousands/µL    Monocytes Absolute 0 51 0 17 - 1 22 Thousand/µL    Eosinophils Absolute 0 22 0 00 - 0 61 Thousand/µL    Basophils Absolute 0 05 0 00 - 0 10 Thousands/µL   Comprehensive metabolic panel    Collection Time: 05/31/22  8:01 AM   Result Value Ref Range    Sodium 141 136 - 145 mmol/L    Potassium 4 4 3 5 - 5 3 mmol/L    Chloride 107 100 - 108 mmol/L    CO2 31 21 - 32 mmol/L    ANION GAP 3 (L) 4 - 13 mmol/L    BUN 28 (H) 5 - 25 mg/dL    Creatinine 2 05 (H) 0 60 - 1 30 mg/dL    Glucose, Fasting 99 65 - 99 mg/dL    Calcium 8 9 8 3 - 10 1 mg/dL    Corrected Calcium 9 4 8 3 - 10 1 mg/dL    AST 14 5 - 45 U/L    ALT 15 12 - 78 U/L    Alkaline Phosphatase 60 46 - 116 U/L    Total Protein 7 2 6 4 - 8 2 g/dL    Albumin 3 4 (L) 3 5 - 5 0 g/dL    Total Bilirubin 1 14 (H) 0 20 - 1 00 mg/dL    eGFR 27 ml/min/1 73sq m   NT-BNP PRO    Collection Time: 05/31/22  8:01 AM   Result Value Ref Range    NT-proBNP 2,664 (H) <450 pg/mL   Hemoglobin A1C    Collection Time: 05/31/22  8:01 AM   Result Value Ref Range    Hemoglobin A1C 5 4 Normal 3 8-5 6%; PreDiabetic 5 7-6 4%; Diabetic >=6 5%; Glycemic control for adults with diabetes <7 0% %     mg/dl   Uric acid    Collection Time: 05/31/22  8:01 AM   Result Value Ref Range    Uric Acid 6 7 4 2 - 8 0 mg/dL   TSH, 3rd generation    Collection Time: 05/31/22  8:01 AM   Result Value Ref Range    TSH 3RD GENERATON 8 460 (H) 0 450 - 4 500 uIU/mL   T4, free    Collection Time: 05/31/22  8:01 AM   Result Value Ref Range    Free T4 0 70 (L) 0 76 - 1 46 ng/dL        Physical Exam  Constitutional:       General: He is not in acute distress  Appearance: Normal appearance  He is obese  HENT:      Head: Normocephalic and atraumatic  Nose: Nose normal       Mouth/Throat:      Mouth: Mucous membranes are moist    Eyes:      Extraocular Movements: Extraocular movements intact  Pupils: Pupils are equal, round, and reactive to light  Cardiovascular:      Rate and Rhythm: Normal rate and regular rhythm  Pulses: Normal pulses  Heart sounds: Normal heart sounds  No murmur heard  No friction rub     Pulmonary: Effort: Pulmonary effort is normal  No respiratory distress  Breath sounds: Normal breath sounds  No wheezing  Abdominal:      General: Abdomen is flat  Bowel sounds are normal  There is no distension  Palpations: Abdomen is soft  There is no mass  Tenderness: There is no abdominal tenderness  There is no guarding  Musculoskeletal:         General: Normal range of motion  Cervical back: Normal range of motion  Skin:     General: Skin is dry  Findings: Bruising present  Neurological:      General: No focal deficit present  Mental Status: He is alert and oriented to person, place, and time  Mental status is at baseline  Cranial Nerves: No cranial nerve deficit        Gait: Gait abnormal    Psychiatric:         Mood and Affect: Mood normal          Behavior: Behavior normal

## 2022-07-28 DIAGNOSIS — I50.32 CHRONIC DIASTOLIC CONGESTIVE HEART FAILURE (HCC): ICD-10-CM

## 2022-07-28 DIAGNOSIS — I10 ESSENTIAL HYPERTENSION: ICD-10-CM

## 2022-07-28 DIAGNOSIS — E03.9 ACQUIRED HYPOTHYROIDISM: ICD-10-CM

## 2022-07-28 RX ORDER — LEVOTHYROXINE SODIUM 0.1 MG/1
100 TABLET ORAL DAILY
Qty: 30 TABLET | Refills: 1 | Status: SHIPPED | OUTPATIENT
Start: 2022-07-28 | End: 2022-08-23 | Stop reason: SDUPTHER

## 2022-07-28 RX ORDER — FUROSEMIDE 40 MG/1
40 TABLET ORAL DAILY
Qty: 30 TABLET | Refills: 2 | Status: SHIPPED | OUTPATIENT
Start: 2022-07-28 | End: 2022-08-23 | Stop reason: SDUPTHER

## 2022-07-28 RX ORDER — METOPROLOL SUCCINATE 25 MG/1
25 TABLET, EXTENDED RELEASE ORAL DAILY
Qty: 30 TABLET | Refills: 1 | Status: SHIPPED | OUTPATIENT
Start: 2022-07-28 | End: 2022-08-23 | Stop reason: SDUPTHER

## 2022-07-28 RX ORDER — AMLODIPINE BESYLATE 5 MG/1
5 TABLET ORAL DAILY
Qty: 30 TABLET | Refills: 2 | Status: SHIPPED | OUTPATIENT
Start: 2022-07-28 | End: 2022-08-23 | Stop reason: SDUPTHER

## 2022-07-28 NOTE — TELEPHONE ENCOUNTER
Refill Request     Amlodipine  Furosemide  Levothyroxine  Metoprolol     Pharmacy: [facility]     LA: 6/14/22  NA: 8/23/22

## 2022-08-08 ENCOUNTER — APPOINTMENT (OUTPATIENT)
Dept: LAB | Facility: HOSPITAL | Age: 87
End: 2022-08-08
Attending: INTERNAL MEDICINE
Payer: MEDICARE

## 2022-08-08 DIAGNOSIS — N18.4 CKD (CHRONIC KIDNEY DISEASE) STAGE 4, GFR 15-29 ML/MIN (HCC): ICD-10-CM

## 2022-08-08 LAB
ANION GAP SERPL CALCULATED.3IONS-SCNC: 5 MMOL/L (ref 4–13)
BUN SERPL-MCNC: 34 MG/DL (ref 5–25)
CALCIUM SERPL-MCNC: 8.8 MG/DL (ref 8.3–10.1)
CHLORIDE SERPL-SCNC: 105 MMOL/L (ref 96–108)
CO2 SERPL-SCNC: 30 MMOL/L (ref 21–32)
CREAT SERPL-MCNC: 2.14 MG/DL (ref 0.6–1.3)
GFR SERPL CREATININE-BSD FRML MDRD: 26 ML/MIN/1.73SQ M
GLUCOSE P FAST SERPL-MCNC: 96 MG/DL (ref 65–99)
POTASSIUM SERPL-SCNC: 3.9 MMOL/L (ref 3.5–5.3)
SODIUM SERPL-SCNC: 140 MMOL/L (ref 135–147)

## 2022-08-08 PROCEDURE — 80048 BASIC METABOLIC PNL TOTAL CA: CPT

## 2022-08-08 PROCEDURE — 36415 COLL VENOUS BLD VENIPUNCTURE: CPT

## 2022-08-22 ENCOUNTER — RA CDI HCC (OUTPATIENT)
Dept: OTHER | Facility: HOSPITAL | Age: 87
End: 2022-08-22

## 2022-08-22 NOTE — PROGRESS NOTES
[address]  coding opportunities       Chart reviewed, no opportunity found: CHART REVIEWED, NO OPPORTUNITY FOUND        Patients Insurance     Medicare Insurance: Medicare

## 2022-08-23 ENCOUNTER — OFFICE VISIT (OUTPATIENT)
Dept: INTERNAL MEDICINE CLINIC | Facility: CLINIC | Age: 87
End: 2022-08-23
Payer: MEDICARE

## 2022-08-23 VITALS
HEIGHT: 66 IN | WEIGHT: 220 LBS | OXYGEN SATURATION: 98 % | HEART RATE: 80 BPM | SYSTOLIC BLOOD PRESSURE: 128 MMHG | DIASTOLIC BLOOD PRESSURE: 72 MMHG | BODY MASS INDEX: 35.36 KG/M2 | TEMPERATURE: 99.5 F

## 2022-08-23 DIAGNOSIS — I50.32 CHRONIC DIASTOLIC CONGESTIVE HEART FAILURE (HCC): Primary | ICD-10-CM

## 2022-08-23 DIAGNOSIS — I10 ESSENTIAL HYPERTENSION: ICD-10-CM

## 2022-08-23 DIAGNOSIS — N18.4 CKD (CHRONIC KIDNEY DISEASE) STAGE 4, GFR 15-29 ML/MIN (HCC): ICD-10-CM

## 2022-08-23 DIAGNOSIS — Z00.00 MEDICARE ANNUAL WELLNESS VISIT, SUBSEQUENT: ICD-10-CM

## 2022-08-23 DIAGNOSIS — M1A.9XX0 CHRONIC GOUT WITHOUT TOPHUS, UNSPECIFIED CAUSE, UNSPECIFIED SITE: ICD-10-CM

## 2022-08-23 DIAGNOSIS — E03.9 ACQUIRED HYPOTHYROIDISM: ICD-10-CM

## 2022-08-23 DIAGNOSIS — E66.01 OBESITY, MORBID (HCC): ICD-10-CM

## 2022-08-23 PROCEDURE — 99214 OFFICE O/P EST MOD 30 MIN: CPT | Performed by: INTERNAL MEDICINE

## 2022-08-23 PROCEDURE — G0439 PPPS, SUBSEQ VISIT: HCPCS | Performed by: INTERNAL MEDICINE

## 2022-08-23 RX ORDER — LEVOTHYROXINE SODIUM 0.1 MG/1
100 TABLET ORAL DAILY
Qty: 30 TABLET | Refills: 3 | Status: SHIPPED | OUTPATIENT
Start: 2022-08-23 | End: 2022-10-18

## 2022-08-23 RX ORDER — FUROSEMIDE 40 MG/1
40 TABLET ORAL DAILY
Qty: 30 TABLET | Refills: 3 | Status: SHIPPED | OUTPATIENT
Start: 2022-08-23

## 2022-08-23 RX ORDER — METOPROLOL SUCCINATE 25 MG/1
25 TABLET, EXTENDED RELEASE ORAL DAILY
Qty: 30 TABLET | Refills: 3 | Status: SHIPPED | OUTPATIENT
Start: 2022-08-23

## 2022-08-23 RX ORDER — AMLODIPINE BESYLATE 5 MG/1
5 TABLET ORAL DAILY
Qty: 30 TABLET | Refills: 3 | Status: SHIPPED | OUTPATIENT
Start: 2022-08-23

## 2022-08-23 NOTE — PATIENT INSTRUCTIONS
Medicare Preventive Visit Patient Instructions  Thank you for completing your Welcome to Medicare Visit or Medicare Annual Wellness Visit today  Your next wellness visit will be due in one year (8/24/2023)  The screening/preventive services that you may require over the next 5-10 years are detailed below  Some tests may not apply to you based off risk factors and/or age  Screening tests ordered at today's visit but not completed yet may show as past due  Also, please note that scanned in results may not display below  Preventive Screenings:  Service Recommendations Previous Testing/Comments   Colorectal Cancer Screening  · Colonoscopy    · Fecal Occult Blood Test (FOBT)/Fecal Immunochemical Test (FIT)  · Fecal DNA/Cologuard Test  · Flexible Sigmoidoscopy Age: 39-70 years old   Colonoscopy: every 10 years (May be performed more frequently if at higher risk)  OR  FOBT/FIT: every 1 year  OR  Cologuard: every 3 years  OR  Sigmoidoscopy: every 5 years  Screening may be recommended earlier than age 39 if at higher risk for colorectal cancer  Also, an individualized decision between you and your healthcare provider will decide whether screening between the ages of 74-80 would be appropriate   Colonoscopy: Not on file  FOBT/FIT: Not on file  Cologuard: Not on file  Sigmoidoscopy: Not on file          Prostate Cancer Screening Individualized decision between patient and health care provider in men between ages of 53-78   Medicare will cover every 12 months beginning on the day after your 50th birthday PSA: No results in last 5 years           Hepatitis C Screening Once for adults born between [birth year] and 1965  More frequently in patients at high risk for Hepatitis C Hep C Antibody: Not on file        Diabetes Screening 1-2 times per year if you're at risk for diabetes or have pre-diabetes Fasting glucose: 96 mg/dL (8/8/2022)  A1C: 5 4 % (5/31/2022)      Cholesterol Screening Once every 5 years if you don't have a lipid disorder  May order more often based on risk factors  Lipid panel: Not on file         Other Preventive Screenings Covered by Medicare:  1  Abdominal Aortic Aneurysm (AAA) Screening: covered once if your at risk  You're considered to be at risk if you have a family history of AAA or a male between the age of 73-68 who smoking at least 100 cigarettes in your lifetime  2  Lung Cancer Screening: covers low dose CT scan once per year if you meet all of the following conditions: (1) Age 50-69; (2) No signs or symptoms of lung cancer; (3) Current smoker or have quit smoking within the last 15 years; (4) You have a tobacco smoking history of at least 20 pack years (packs per day x number of years you smoked); (5) You get a written order from a healthcare provider  3  Glaucoma Screening: covered annually if you're considered high risk: (1) You have diabetes OR (2) Family history of glaucoma OR (3)  aged 48 and older OR (3)  American aged 72 and older  3  Osteoporosis Screening: covered every 2 years if you meet one of the following conditions: (1) Have a vertebral abnormality; (2) On glucocorticoid therapy for more than 3 months; (3) Have primary hyperparathyroidism; (4) On osteoporosis medications and need to assess response to drug therapy  5  HIV Screening: covered annually if you're between the age of 12-76  Also covered annually if you are younger than 13 and older than 72 with risk factors for HIV infection  For pregnant patients, it is covered up to 3 times per pregnancy      Immunizations:  Immunization Recommendations   Influenza Vaccine Annual influenza vaccination during flu season is recommended for all persons aged >= 6 months who do not have contraindications   Pneumococcal Vaccine   * Pneumococcal conjugate vaccine = PCV13 (Prevnar 13), PCV15 (Vaxneuvance), PCV20 (Prevnar 20)  * Pneumococcal polysaccharide vaccine = PPSV23 (Pneumovax) Adults 25-60 years old: 1-3 doses may be recommended based on certain risk factors  Adults 72 years old: 1-2 doses may be recommended based off what pneumonia vaccine you previously received   Hepatitis B Vaccine 3 dose series if at intermediate or high risk (ex: diabetes, end stage renal disease, liver disease)   Tetanus (Td) Vaccine - COST NOT COVERED BY MEDICARE PART B Following completion of primary series, a booster dose should be given every 10 years to maintain immunity against tetanus  Td may also be given as tetanus wound prophylaxis  Tdap Vaccine - COST NOT COVERED BY MEDICARE PART B Recommended at least once for all adults  For pregnant patients, recommended with each pregnancy  Shingles Vaccine (Shingrix) - COST NOT COVERED BY MEDICARE PART B  2 shot series recommended in those aged 48 and above     Health Maintenance Due:  There are no preventive care reminders to display for this patient  Immunizations Due:      Topic Date Due    Pneumococcal Vaccine: 65+ Years (2 - PPSV23 or PCV20) 03/09/2016    COVID-19 Vaccine (3 - Booster for Pfizer series) 07/08/2021    Influenza Vaccine (1) 09/01/2022     Advance Directives   What are advance directives? Advance directives are legal documents that state your wishes and plans for medical care  These plans are made ahead of time in case you lose your ability to make decisions for yourself  Advance directives can apply to any medical decision, such as the treatments you want, and if you want to donate organs  What are the types of advance directives? There are many types of advance directives, and each state has rules about how to use them  You may choose a combination of any of the following:  · Living will: This is a written record of the treatment you want  You can also choose which treatments you do not want, which to limit, and which to stop at a certain time  This includes surgery, medicine, IV fluid, and tube feedings  · Durable power of  for healthcare [facility]):   This is a written record that states who you want to make healthcare choices for you when you are unable to make them for yourself  This person, called a proxy, is usually a family member or a friend  You may choose more than 1 proxy  · Do not resuscitate (DNR) order:  A DNR order is used in case your heart stops beating or you stop breathing  It is a request not to have certain forms of treatment, such as CPR  A DNR order may be included in other types of advance directives  · Medical directive: This covers the care that you want if you are in a coma, near death, or unable to make decisions for yourself  You can list the treatments you want for each condition  Treatment may include pain medicine, surgery, blood transfusions, dialysis, IV or tube feedings, and a ventilator (breathing machine)  · Values history: This document has questions about your views, beliefs, and how you feel and think about life  This information can help others choose the care that you would choose  Why are advance directives important? An advance directive helps you control your care  Although spoken wishes may be used, it is better to have your wishes written down  Spoken wishes can be misunderstood, or not followed  Treatments may be given even if you do not want them  An advance directive may make it easier for your family to make difficult choices about your care  Weight Management   Why it is important to manage your weight:  Being overweight increases your risk of health conditions such as heart disease, high blood pressure, type 2 diabetes, and certain types of cancer  It can also increase your risk for osteoarthritis, sleep apnea, and other respiratory problems  Aim for a slow, steady weight loss  Even a small amount of weight loss can lower your risk of health problems  How to lose weight safely:  A safe and healthy way to lose weight is to eat fewer calories and get regular exercise   You can lose up about 1 pound a week by decreasing the number of calories you eat by 500 calories each day  Healthy meal plan for weight management:  A healthy meal plan includes a variety of foods, contains fewer calories, and helps you stay healthy  A healthy meal plan includes the following:  · Eat whole-grain foods more often  A healthy meal plan should contain fiber  Fiber is the part of grains, fruits, and vegetables that is not broken down by your body  Whole-grain foods are healthy and provide extra fiber in your diet  Some examples of whole-grain foods are whole-wheat breads and pastas, oatmeal, brown rice, and bulgur  · Eat a variety of vegetables every day  Include dark, leafy greens such as spinach, kale, tray greens, and mustard greens  Eat yellow and orange vegetables such as carrots, sweet potatoes, and winter squash  · Eat a variety of fruits every day  Choose fresh or canned fruit (canned in its own juice or light syrup) instead of juice  Fruit juice has very little or no fiber  · Eat low-fat dairy foods  Drink fat-free (skim) milk or 1% milk  Eat fat-free yogurt and low-fat cottage cheese  Try low-fat cheeses such as mozzarella and other reduced-fat cheeses  · Choose meat and other protein foods that are low in fat  Choose beans or other legumes such as split peas or lentils  Choose fish, skinless poultry (chicken or turkey), or lean cuts of red meat (beef or pork)  Before you cook meat or poultry, cut off any visible fat  · Use less fat and oil  Try baking foods instead of frying them  Add less fat, such as margarine, sour cream, regular salad dressing and mayonnaise to foods  Eat fewer high-fat foods  Some examples of high-fat foods include french fries, doughnuts, ice cream, and cakes  · Eat fewer sweets  Limit foods and drinks that are high in sugar  This includes candy, cookies, regular soda, and sweetened drinks  Exercise:  Exercise at least 30 minutes per day on most days of the week   Some examples of exercise include walking, biking, dancing, and swimming  You can also fit in more physical activity by taking the stairs instead of the elevator or parking farther away from stores  Ask your healthcare provider about the best exercise plan for you  © Copyright [employer] 2018 Information is for End User's use only and may not be sold, redistributed or otherwise used for commercial purposes   All illustrations and images included in CareNotes® are the copyrighted property of A D A M , Inc  or [address] [place] [address]

## 2022-08-23 NOTE — PROGRESS NOTES
Assessment and Plan:     Problem List Items Addressed This Visit        Endocrine    Acquired hypothyroidism    Relevant Medications    levothyroxine 100 mcg tablet    metoprolol succinate (TOPROL-XL) 25 mg 24 hr tablet    Other Relevant Orders    T4, free    TSH, 3rd generation       Cardiovascular and Mediastinum    Essential hypertension    Relevant Medications    furosemide (LASIX) 40 mg tablet    amLODIPine (NORVASC) 5 mg tablet    metoprolol succinate (TOPROL-XL) 25 mg 24 hr tablet    Other Relevant Orders    CBC and differential    Comprehensive metabolic panel    Urinalysis with microscopic    Chronic diastolic congestive heart failure (HCC) - Primary    Relevant Medications    furosemide (LASIX) 40 mg tablet    amLODIPine (NORVASC) 5 mg tablet    metoprolol succinate (TOPROL-XL) 25 mg 24 hr tablet       Genitourinary    CKD (chronic kidney disease) stage 4, GFR 15-29 ml/min (HCC)    Relevant Medications    furosemide (LASIX) 40 mg tablet       Other    Chronic gout without tophus    Relevant Orders    Uric acid    Obesity, morbid ([facility] [address] )      Other Visit Diagnoses     Medicare annual wellness visit, subsequent               Preventive health issues were discussed with patient, and age appropriate screening tests were ordered as noted in patient's After Visit Summary  Personalized health advice and appropriate referrals for health education or preventive services given if needed, as noted in patient's After Visit Summary  History of Present Illness:     Patient presents for a Medicare Wellness Visit    This is a 26-year-old gentleman with history of hypertension congestive heart failure BPH status post TURP obesity hypothyroidism impaired fasting sugar chronic kidney disease stage 4  Currently he denies any chest pain or shortness of breath  Advised strict daily monitoring of his weight  Since his wife has Alzheimer's dementia he has been eating out       Patient Care Team:  Ruslan Thomas MD as PCP - MICEHLLE Lynch     Review of Systems:     Review of Systems   Constitutional: Negative for appetite change, chills, fatigue and fever  HENT: Negative for sore throat and trouble swallowing  Eyes: Negative for redness  Respiratory: Negative for shortness of breath  Cardiovascular: Negative for chest pain and palpitations  Gastrointestinal: Negative for abdominal pain, constipation and diarrhea  Genitourinary: Negative for dysuria and hematuria  Musculoskeletal: Positive for gait problem  Negative for back pain and neck pain  Skin: Negative for rash  Neurological: Negative for seizures, weakness and headaches  Hematological: Negative for adenopathy  Psychiatric/Behavioral: Negative for confusion  The patient is not nervous/anxious  Problem List:     Patient Active Problem List   Diagnosis    Sepsis ([place] [address] )    Cellulitis of right lower extremity    Lower extremity edema    Essential hypertension    Acquired hypothyroidism    Chronic diastolic congestive heart failure ([facility])    Leukocytosis    Venous stasis ulcer ([place] [address] )    Stage 3b chronic kidney disease ([place] [address] )    PVC (premature ventricular contraction)    Obesity (BMI 30-39  9)    Chronic gout without tophus    CHF (congestive heart failure) ([facility])    Impaired fasting blood sugar    Venous stasis dermatitis of both lower extremities    Obesity, morbid ([facility])    CKD (chronic kidney disease) stage 4, GFR 15-29 ml/min ([facility])    Venous stasis    Frailty syndrome in geriatric patient      Past Medical and Surgical History:     Past Medical History:   Diagnosis Date    VAN (acute kidney injury) ([place] [address] )     Atrial flutter ([facility])     CHF (congestive heart failure) ([facility])     CKD (chronic kidney disease) stage 3, GFR 30-59 ml/min ([facility])     Hypertension     Obstructed, uropathy     Sepsis ([place] [address] )     Venous stasis      Past Surgical History:   Procedure Laterality Date    PROSTATE SURGERY      TONSILLECTOMY      TRANSURETHRAL RESECTION OF PROSTATE        Family History:     History reviewed  No pertinent family history     Social History:     Social History     Socioeconomic History    Marital status: /Civil Union     Spouse name: None    Number of children: 0    Years of education: None    Highest education level: None   Occupational History    None   Tobacco Use    Smoking status: Never Smoker    Smokeless tobacco: Never Used   Vaping Use    Vaping Use: Never used   Substance and Sexual Activity    Alcohol use: No    Drug use: No    Sexual activity: Not Currently   Other Topics Concern    None   Social History Narrative    Travel outside [place]: No      Social Determinants of Health     Financial Resource Strain: Not on file   Food Insecurity: Not on file   Transportation Needs: Not on file   Physical Activity: Not on file   Stress: Not on file   Social Connections: Not on file   Intimate Partner Violence: Not on file   Housing Stability: Not on file      Medications and Allergies:     Current Outpatient Medications   Medication Sig Dispense Refill    allopurinol (ZYLOPRIM) 100 mg tablet Take 1 tablet (100 mg total) by mouth 2 (two) times a day 60 tablet 2    amLODIPine (NORVASC) 5 mg tablet Take 1 tablet (5 mg total) by mouth daily 30 tablet 3    carbamide peroxide (DEBROX) 6 5 % otic solution Administer 5 drops into both ears 2 (two) times a day 15 mL 0    cholecalciferol (VITAMIN D3) 1,000 units tablet Take 1 tablet (1,000 Units total) by mouth daily 100 tablet 3    ferrous sulfate 325 (65 Fe) mg tablet Take 1 tablet (325 mg total) by mouth daily with breakfast 30 tablet 2    furosemide (LASIX) 40 mg tablet Take 1 tablet (40 mg total) by mouth daily 30 tablet 3    levothyroxine 100 mcg tablet Take 1 tablet (100 mcg total) by mouth daily 30 tablet 3    metoprolol succinate (TOPROL-XL) 25 mg 24 hr tablet Take 1 tablet (25 mg total) by mouth daily 30 tablet 3    neomycin-bacitracin-polymyxin (NEOSPORIN) 5-400-5,000 ointment Apply topically 3 (three) times a day for 5 days (Patient not taking: No sig reported) 28 3 g 0     No current facility-administered medications for this visit  No Known Allergies   Immunizations:     Immunization History   Administered Date(s) Administered    COVID-19 PFIZER VACCINE 0 3 ML IM 01/17/2021, 02/08/2021    Influenza, high dose seasonal 0 7 mL 11/02/2021    Pneumococcal Conjugate 13-Valent 03/09/2015    Tdap 06/13/2017, 06/06/2020    Zoster 12/30/2013      Health Maintenance: There are no preventive care reminders to display for this patient  Topic Date Due    Pneumococcal Vaccine: 65+ Years (2 - PPSV23 or PCV20) 03/09/2016    COVID-19 Vaccine (3 - Booster for Pfizer series) 07/08/2021    Influenza Vaccine (1) 09/01/2022      Medicare Screening Tests and Risk Assessments:     Mac Phalen is here for his Subsequent Wellness visit  Last Medicare Wellness visit information reviewed, patient interviewed and updates made to the record today  Health Risk Assessment:   Patient rates overall health as excellent  Patient feels that their physical health rating is same  Patient is satisfied with their life  Eyesight was rated as same  Hearing was rated as same  Patient feels that their emotional and mental health rating is slightly worse  Patients states they are never, rarely angry  Patient states they are sometimes unusually tired/fatigued  Pain experienced in the last 7 days has been none  Patient states that he has experienced no weight loss or gain in last 6 months  Fall Risk Screening: In the past year, patient has experienced: no history of falling in past year      Home Safety:  Patient has trouble with stairs inside or outside of their home  Patient has working smoke alarms and has working carbon monoxide detector  Home safety hazards include: none  Nutrition:   Current diet is Regular       Medications:   Patient is currently taking over-the-counter supplements  OTC medications include: see medication list  Patient is able to manage medications  Activities of Daily Living (ADLs)/Instrumental Activities of Daily Living (IADLs):   Walk and transfer into and out of bed and chair?: Yes  Dress and groom yourself?: Yes    Bathe or shower yourself?: Yes    Feed yourself? Yes  Do your laundry/housekeeping?: Yes  Manage your money, pay your bills and track your expenses?: Yes  Make your own meals?: Yes    Do your own shopping?: Yes    Previous Hospitalizations:   Any hospitalizations or ED visits within the last 12 months?: No      Advance Care Planning:   Living will: Yes    Advanced directive: Yes      PREVENTIVE SCREENINGS        Diabetes Screening:     General: Screening Current      Colorectal Cancer Screening:     General: Screening Not Indicated      Prostate Cancer Screening:    General: Screening Not Indicated      Lung Cancer Screening:     General: Screening Not Indicated    Screening, Brief Intervention, and Referral to Treatment (SBIRT)    Screening  Typical number of drinks in a day: 0  Typical number of drinks in a week: 0  Interpretation: Low risk drinking behavior  No exam data present     Physical Exam:     /72 (BP Location: Left arm, Patient Position: Sitting, Cuff Size: Adult)   Pulse 80   Temp 99 5 °F (37 5 °C) (Temporal)   Ht 5' 6" (1 676 m)   Wt 99 8 kg (220 lb)   SpO2 98%   BMI 35 51 kg/m²     Physical Exam  Vitals and nursing note reviewed  Constitutional:       Appearance: He is well-developed  HENT:      Head: Normocephalic and atraumatic  Mouth/Throat:      Mouth: Mucous membranes are moist    Eyes:      Conjunctiva/sclera: Conjunctivae normal    Cardiovascular:      Rate and Rhythm: Normal rate and regular rhythm  Heart sounds: No murmur heard  Pulmonary:      Effort: Pulmonary effort is normal  No respiratory distress  Breath sounds: Normal breath sounds     Abdominal:      Palpations: Abdomen is soft  Tenderness: There is no abdominal tenderness  Musculoskeletal:      Cervical back: Normal range of motion and neck supple  Skin:     General: Skin is warm and dry  Neurological:      Mental Status: He is alert        Gait: Gait abnormal           Tanya Clifford MD

## 2022-10-07 ENCOUNTER — APPOINTMENT (OUTPATIENT)
Dept: LAB | Facility: CLINIC | Age: 87
End: 2022-10-07
Payer: MEDICARE

## 2022-10-07 DIAGNOSIS — M1A.9XX0 CHRONIC GOUT WITHOUT TOPHUS, UNSPECIFIED CAUSE, UNSPECIFIED SITE: ICD-10-CM

## 2022-10-07 DIAGNOSIS — E03.9 ACQUIRED HYPOTHYROIDISM: ICD-10-CM

## 2022-10-07 DIAGNOSIS — I10 ESSENTIAL HYPERTENSION: ICD-10-CM

## 2022-10-07 LAB
ALBUMIN SERPL BCP-MCNC: 3.7 G/DL (ref 3.5–5)
ALP SERPL-CCNC: 65 U/L (ref 46–116)
ALT SERPL W P-5'-P-CCNC: 16 U/L (ref 12–78)
ANION GAP SERPL CALCULATED.3IONS-SCNC: 3 MMOL/L (ref 4–13)
AST SERPL W P-5'-P-CCNC: 12 U/L (ref 5–45)
BACTERIA UR QL AUTO: ABNORMAL /HPF
BASOPHILS # BLD AUTO: 0.05 THOUSANDS/ΜL (ref 0–0.1)
BASOPHILS NFR BLD AUTO: 1 % (ref 0–1)
BILIRUB SERPL-MCNC: 0.76 MG/DL (ref 0.2–1)
BILIRUB UR QL STRIP: NEGATIVE
BUN SERPL-MCNC: 37 MG/DL (ref 5–25)
CALCIUM SERPL-MCNC: 8.7 MG/DL (ref 8.3–10.1)
CHLORIDE SERPL-SCNC: 105 MMOL/L (ref 96–108)
CLARITY UR: CLEAR
CO2 SERPL-SCNC: 29 MMOL/L (ref 21–32)
COLOR UR: COLORLESS
CREAT SERPL-MCNC: 2.14 MG/DL (ref 0.6–1.3)
EOSINOPHIL # BLD AUTO: 0.18 THOUSAND/ΜL (ref 0–0.61)
EOSINOPHIL NFR BLD AUTO: 3 % (ref 0–6)
ERYTHROCYTE [DISTWIDTH] IN BLOOD BY AUTOMATED COUNT: 16.2 % (ref 11.6–15.1)
GFR SERPL CREATININE-BSD FRML MDRD: 26 ML/MIN/1.73SQ M
GLUCOSE P FAST SERPL-MCNC: 103 MG/DL (ref 65–99)
GLUCOSE UR STRIP-MCNC: NEGATIVE MG/DL
HCT VFR BLD AUTO: 31.2 % (ref 36.5–49.3)
HGB BLD-MCNC: 10.2 G/DL (ref 12–17)
HGB UR QL STRIP.AUTO: NEGATIVE
HYALINE CASTS #/AREA URNS LPF: ABNORMAL /LPF
IMM GRANULOCYTES # BLD AUTO: 0.01 THOUSAND/UL (ref 0–0.2)
IMM GRANULOCYTES NFR BLD AUTO: 0 % (ref 0–2)
KETONES UR STRIP-MCNC: NEGATIVE MG/DL
LEUKOCYTE ESTERASE UR QL STRIP: NEGATIVE
LYMPHOCYTES # BLD AUTO: 1.51 THOUSANDS/ΜL (ref 0.6–4.47)
LYMPHOCYTES NFR BLD AUTO: 25 % (ref 14–44)
MCH RBC QN AUTO: 33.3 PG (ref 26.8–34.3)
MCHC RBC AUTO-ENTMCNC: 32.7 G/DL (ref 31.4–37.4)
MCV RBC AUTO: 102 FL (ref 82–98)
MONOCYTES # BLD AUTO: 0.64 THOUSAND/ΜL (ref 0.17–1.22)
MONOCYTES NFR BLD AUTO: 11 % (ref 4–12)
NEUTROPHILS # BLD AUTO: 3.61 THOUSANDS/ΜL (ref 1.85–7.62)
NEUTS SEG NFR BLD AUTO: 60 % (ref 43–75)
NITRITE UR QL STRIP: NEGATIVE
NON-SQ EPI CELLS URNS QL MICRO: ABNORMAL /HPF
NRBC BLD AUTO-RTO: 0 /100 WBCS
PH UR STRIP.AUTO: 6 [PH]
PLATELET # BLD AUTO: 169 THOUSANDS/UL (ref 149–390)
PMV BLD AUTO: 10.6 FL (ref 8.9–12.7)
POTASSIUM SERPL-SCNC: 3.8 MMOL/L (ref 3.5–5.3)
PROT SERPL-MCNC: 7.1 G/DL (ref 6.4–8.4)
PROT UR STRIP-MCNC: NEGATIVE MG/DL
RBC # BLD AUTO: 3.06 MILLION/UL (ref 3.88–5.62)
RBC #/AREA URNS AUTO: ABNORMAL /HPF
SODIUM SERPL-SCNC: 137 MMOL/L (ref 135–147)
SP GR UR STRIP.AUTO: 1.01 (ref 1–1.03)
T4 FREE SERPL-MCNC: 0.82 NG/DL (ref 0.76–1.46)
TSH SERPL DL<=0.05 MIU/L-ACNC: 8.58 UIU/ML (ref 0.45–4.5)
URATE SERPL-MCNC: 7.6 MG/DL (ref 3.5–8.5)
UROBILINOGEN UR STRIP-ACNC: <2 MG/DL
WBC # BLD AUTO: 6 THOUSAND/UL (ref 4.31–10.16)
WBC #/AREA URNS AUTO: ABNORMAL /HPF

## 2022-10-07 PROCEDURE — 84550 ASSAY OF BLOOD/URIC ACID: CPT

## 2022-10-07 PROCEDURE — 84439 ASSAY OF FREE THYROXINE: CPT

## 2022-10-07 PROCEDURE — 84443 ASSAY THYROID STIM HORMONE: CPT

## 2022-10-07 PROCEDURE — 81001 URINALYSIS AUTO W/SCOPE: CPT | Performed by: INTERNAL MEDICINE

## 2022-10-07 PROCEDURE — 36415 COLL VENOUS BLD VENIPUNCTURE: CPT

## 2022-10-07 PROCEDURE — 80053 COMPREHEN METABOLIC PANEL: CPT

## 2022-10-07 PROCEDURE — 85025 COMPLETE CBC W/AUTO DIFF WBC: CPT

## 2022-10-11 ENCOUNTER — RA CDI HCC (OUTPATIENT)
Dept: OTHER | Facility: HOSPITAL | Age: 87
End: 2022-10-11

## 2022-10-11 NOTE — PROGRESS NOTES
[facility]  coding opportunities          Chart Reviewed number of suggestions sent to Provider: 1   [id number]    Patients Insurance     Medicare Insurance: [employer]

## 2022-10-18 ENCOUNTER — OFFICE VISIT (OUTPATIENT)
Dept: INTERNAL MEDICINE CLINIC | Facility: CLINIC | Age: 87
End: 2022-10-18
Payer: MEDICARE

## 2022-10-18 VITALS
OXYGEN SATURATION: 97 % | HEART RATE: 91 BPM | HEIGHT: 66 IN | SYSTOLIC BLOOD PRESSURE: 120 MMHG | WEIGHT: 225 LBS | DIASTOLIC BLOOD PRESSURE: 70 MMHG | TEMPERATURE: 97.6 F | BODY MASS INDEX: 36.16 KG/M2

## 2022-10-18 DIAGNOSIS — E03.9 ACQUIRED HYPOTHYROIDISM: ICD-10-CM

## 2022-10-18 DIAGNOSIS — I10 ESSENTIAL HYPERTENSION: ICD-10-CM

## 2022-10-18 DIAGNOSIS — M1A.9XX0 CHRONIC GOUT WITHOUT TOPHUS, UNSPECIFIED CAUSE, UNSPECIFIED SITE: ICD-10-CM

## 2022-10-18 DIAGNOSIS — R54 FRAILTY SYNDROME IN GERIATRIC PATIENT: ICD-10-CM

## 2022-10-18 DIAGNOSIS — E66.01 OBESITY, MORBID (HCC): ICD-10-CM

## 2022-10-18 DIAGNOSIS — I50.32 CHRONIC DIASTOLIC CONGESTIVE HEART FAILURE (HCC): Primary | ICD-10-CM

## 2022-10-18 DIAGNOSIS — Z23 NEEDS FLU SHOT: ICD-10-CM

## 2022-10-18 PROCEDURE — G0008 ADMIN INFLUENZA VIRUS VAC: HCPCS

## 2022-10-18 PROCEDURE — 90662 IIV NO PRSV INCREASED AG IM: CPT

## 2022-10-18 PROCEDURE — 99214 OFFICE O/P EST MOD 30 MIN: CPT | Performed by: INTERNAL MEDICINE

## 2022-10-18 RX ORDER — LEVOTHYROXINE SODIUM 112 UG/1
112 TABLET ORAL
Qty: 90 TABLET | Refills: 1 | Status: SHIPPED | OUTPATIENT
Start: 2022-10-18

## 2022-10-18 NOTE — PROGRESS NOTES
Assessment/Plan:           1  Chronic diastolic congestive heart failure (HCC)  Comments:  Continue Lasix 40 mg daily  Orders:  -     Comprehensive metabolic panel; Future  -     CBC and differential; Future    2  Essential hypertension    3  Acquired hypothyroidism  Comments:  Levothyroxine was increased to 112 mcgs  Will recheck labs in 3 months  Orders:  -     levothyroxine (Euthyrox) 112 mcg tablet; Take 1 tablet (112 mcg total) by mouth daily in the early morning  -     T4, free; Future  -     TSH, 3rd generation; Future    4  Chronic gout without tophus, unspecified cause, unspecified site  Comments:  Continue allopurinol 200 mg daily and low purine diet  5  Frailty syndrome in geriatric patient    6  Obesity, morbid ([address] )    7  Needs flu shot  -     influenza vaccine, high-dose, PF 0 7 mL (FLUZONE HIGH-DOSE)         1  Chronic diastolic congestive heart failure ([address] )      2  Essential hypertension      3  Acquired hypothyroidism      4  Chronic gout without tophus, unspecified cause, unspecified site      5  Frailty syndrome in geriatric patient      6  Obesity, morbid ([address] )         No problem-specific Assessment & Plan notes found for this encounter  Subjective:      Patient ID: Jayne Mcclain is a 80 y o  male  HPI    The following portions of the patient's history were reviewed and updated as appropriate: He  has a past medical history of VAN (acute kidney injury) ([address] ), Atrial flutter ([address] ), CHF (congestive heart failure) ([address] ), CKD (chronic kidney disease) stage 3, GFR 30-59 ml/min ([address] ), Hypertension, Obstructed, uropathy, Sepsis ([address] ), and Venous stasis    He   Patient Active Problem List    Diagnosis Date Noted   • Venous stasis 04/19/2022   • Frailty syndrome in geriatric patient 04/19/2022   • CKD (chronic kidney disease) stage 4, GFR 15-29 ml/min ([address] ) 05/03/2021   • Obesity (BMI 30-39 9) 03/15/2021   • Chronic gout without tophus 03/15/2021   • Impaired fasting blood sugar 03/15/2021 • Venous stasis dermatitis of both lower extremities 03/15/2021   • Obesity, morbid ([place] [id number] ) 03/15/2021   • CHF (congestive heart failure) (formerly [facility])    • PVC (premature ventricular contraction) 08/05/2019   • Chronic diastolic congestive heart failure ([place] [id number] ) 06/14/2017   • Leukocytosis 06/14/2017   • Venous stasis ulcer ([place] [address] ) 06/14/2017   • Stage 3b chronic kidney disease ([place] [address] ) 06/14/2017   • Sepsis ([place] [address] ) 06/13/2017   • Cellulitis of right lower extremity 06/13/2017   • Lower extremity edema 06/13/2017   • Essential hypertension 06/13/2017   • Acquired hypothyroidism 06/13/2017     He  has a past surgical history that includes Prostate surgery; Transurethral resection of prostate; and Tonsillectomy  His family history is not on file  He  reports that he has never smoked  He has never used smokeless tobacco  He reports that he does not drink alcohol and does not use drugs  Current Outpatient Medications   Medication Sig Dispense Refill   • allopurinol (ZYLOPRIM) 100 mg tablet Take 1 tablet (100 mg total) by mouth 2 (two) times a day 60 tablet 2   • amLODIPine (NORVASC) 5 mg tablet Take 1 tablet (5 mg total) by mouth daily 30 tablet 3   • cholecalciferol (VITAMIN D3) 1,000 units tablet Take 1 tablet (1,000 Units total) by mouth daily 100 tablet 3   • ferrous sulfate 325 (65 Fe) mg tablet Take 1 tablet (325 mg total) by mouth daily with breakfast 30 tablet 2   • furosemide (LASIX) 40 mg tablet Take 1 tablet (40 mg total) by mouth daily 30 tablet 3   • levothyroxine (Euthyrox) 112 mcg tablet Take 1 tablet (112 mcg total) by mouth daily in the early morning 90 tablet 1   • metoprolol succinate (TOPROL-XL) 25 mg 24 hr tablet Take 1 tablet (25 mg total) by mouth daily 30 tablet 3   • neomycin-bacitracin-polymyxin (NEOSPORIN) 5-400-5,000 ointment Apply topically 3 (three) times a day for 5 days (Patient not taking: No sig reported) 28 3 g 0     No current facility-administered medications for this visit       Current Outpatient Medications on File Prior to Visit   Medication Sig   • allopurinol (ZYLOPRIM) 100 mg tablet Take 1 tablet (100 mg total) by mouth 2 (two) times a day   • amLODIPine (NORVASC) 5 mg tablet Take 1 tablet (5 mg total) by mouth daily   • cholecalciferol (VITAMIN D3) 1,000 units tablet Take 1 tablet (1,000 Units total) by mouth daily   • ferrous sulfate 325 (65 Fe) mg tablet Take 1 tablet (325 mg total) by mouth daily with breakfast   • furosemide (LASIX) 40 mg tablet Take 1 tablet (40 mg total) by mouth daily   • metoprolol succinate (TOPROL-XL) 25 mg 24 hr tablet Take 1 tablet (25 mg total) by mouth daily   • [DISCONTINUED] carbamide peroxide (DEBROX) 6 5 % otic solution Administer 5 drops into both ears 2 (two) times a day   • [DISCONTINUED] levothyroxine 100 mcg tablet Take 1 tablet (100 mcg total) by mouth daily   • neomycin-bacitracin-polymyxin (NEOSPORIN) 5-400-5,000 ointment Apply topically 3 (three) times a day for 5 days (Patient not taking: No sig reported)     No current facility-administered medications on file prior to visit  He has No Known Allergies       Review of Systems   Constitutional: Negative for appetite change, chills, fatigue and fever  HENT: Negative for sore throat and trouble swallowing  Eyes: Negative for redness  Respiratory: Negative for shortness of breath  Cardiovascular: Negative for chest pain and palpitations  Gastrointestinal: Negative for abdominal pain, constipation and diarrhea  Genitourinary: Negative for dysuria and hematuria  Musculoskeletal: Negative for back pain and neck pain  Skin: Negative for rash  Neurological: Negative for seizures, weakness and headaches  Hematological: Negative for adenopathy  Psychiatric/Behavioral: Negative for confusion  The patient is not nervous/anxious            Objective:      /70 (BP Location: Left arm, Patient Position: Sitting, Cuff Size: Adult)   Pulse 91   Temp 97 6 °F (36 4 °C) (Temporal)   Ht 5' 6" (1 676 m)   Wt 102 kg (225 lb)   SpO2 97%   BMI 36 32 kg/m²     Results Reviewed     None          Recent Results (from the past 1344 hour(s))   Urinalysis with microscopic    Collection Time: 10/07/22  9:30 AM   Result Value Ref Range    Color, UA Colorless     Clarity, UA Clear     Specific [place], UA 1 010 1 003 - 1 030    pH, UA 6 0 4 5, 5 0, 5 5, 6 0, 6 5, 7 0, 7 5, 8 0    Leukocytes, UA Negative Negative    Nitrite, UA Negative Negative    Protein, UA Negative Negative mg/dl    Glucose, UA Negative Negative mg/dl    Ketones, UA Negative Negative mg/dl    Urobilinogen, UA <2 0 <2 0 mg/dl mg/dl    Bilirubin, UA Negative Negative    Occult Blood, UA Negative Negative    RBC, UA None Seen None Seen, 1-2 /hpf    WBC, UA None Seen None Seen, 1-2 /hpf    Epithelial Cells Occasional None Seen, Occasional /hpf    Bacteria, UA None Seen None Seen, Occasional /hpf    Hyaline Casts, UA 0-3 (A) None Seen /lpf   T4, free    Collection Time: 10/07/22  9:30 AM   Result Value Ref Range    Free T4 0 82 0 76 - 1 46 ng/dL   TSH, 3rd generation    Collection Time: 10/07/22  9:30 AM   Result Value Ref Range    TSH 3RD GENERATON 8 580 (H) 0 450 - 4 500 uIU/mL   CBC and differential    Collection Time: 10/07/22  9:30 AM   Result Value Ref Range    WBC 6 00 4 31 - 10 16 Thousand/uL    RBC 3 06 (L) 3 88 - 5 62 Million/uL    Hemoglobin 10 2 (L) 12 0 - 17 0 g/dL    Hematocrit 31 2 (L) 36 5 - 49 3 %     (H) 82 - 98 fL    MCH 33 3 26 8 - 34 3 pg    MCHC 32 7 31 4 - 37 4 g/dL    RDW 16 2 (H) 11 6 - 15 1 %    MPV 10 6 8 9 - 12 7 fL    Platelets 506 501 - 150 Thousands/uL    nRBC 0 /100 WBCs    Neutrophils Relative 60 43 - 75 %    Immat GRANS % 0 0 - 2 %    Lymphocytes Relative 25 14 - 44 %    Monocytes Relative 11 4 - 12 %    Eosinophils Relative 3 0 - 6 %    Basophils Relative 1 0 - 1 %    Neutrophils Absolute 3 61 1 85 - 7 62 Thousands/µL    Immature Grans Absolute 0 01 0 00 - 0 20 Thousand/uL    Lymphocytes Absolute 1 51 0 60 - 4 47 Thousands/µL    Monocytes Absolute 0 64 0 17 - 1 22 Thousand/µL    Eosinophils Absolute 0 18 0 00 - 0 61 Thousand/µL    Basophils Absolute 0 05 0 00 - 0 10 Thousands/µL   Comprehensive metabolic panel    Collection Time: 10/07/22  9:30 AM   Result Value Ref Range    Sodium 137 135 - 147 mmol/L    Potassium 3 8 3 5 - 5 3 mmol/L    Chloride 105 96 - 108 mmol/L    CO2 29 21 - 32 mmol/L    ANION GAP 3 (L) 4 - 13 mmol/L    BUN 37 (H) 5 - 25 mg/dL    Creatinine 2 14 (H) 0 60 - 1 30 mg/dL    Glucose, Fasting 103 (H) 65 - 99 mg/dL    Calcium 8 7 8 3 - 10 1 mg/dL    AST 12 5 - 45 U/L    ALT 16 12 - 78 U/L    Alkaline Phosphatase 65 46 - 116 U/L    Total Protein 7 1 6 4 - 8 4 g/dL    Albumin 3 7 3 5 - 5 0 g/dL    Total Bilirubin 0 76 0 20 - 1 00 mg/dL    eGFR 26 ml/min/1 73sq m   Uric acid    Collection Time: 10/07/22  9:30 AM   Result Value Ref Range    Uric Acid 7 6 3 5 - 8 5 mg/dL        Physical Exam  Constitutional:       General: He is not in acute distress  Appearance: Normal appearance  He is obese  HENT:      Head: Normocephalic and atraumatic  Right Ear: Tympanic membrane normal       Left Ear: Tympanic membrane normal       Nose: Nose normal       Mouth/Throat:      Mouth: Mucous membranes are moist    Eyes:      Extraocular Movements: Extraocular movements intact  Pupils: Pupils are equal, round, and reactive to light  Cardiovascular:      Rate and Rhythm: Normal rate and regular rhythm  Pulses: Normal pulses  Heart sounds: Normal heart sounds  No murmur heard  No friction rub  Pulmonary:      Effort: Pulmonary effort is normal  No respiratory distress  Breath sounds: Normal breath sounds  No wheezing  Abdominal:      General: Abdomen is flat  Bowel sounds are normal  There is no distension  Palpations: Abdomen is soft  There is no mass  Tenderness: There is no abdominal tenderness  There is no guarding     Musculoskeletal:         General: Normal range of motion  Cervical back: Normal range of motion  Right lower leg: Edema present  Left lower leg: Edema present  Skin:     General: Skin is warm  Neurological:      General: No focal deficit present  Mental Status: He is alert and oriented to person, place, and time  Mental status is at baseline  Cranial Nerves: No cranial nerve deficit        Gait: Gait abnormal    Psychiatric:         Mood and Affect: Mood normal          Behavior: Behavior normal

## 2022-12-21 DIAGNOSIS — M1A.9XX0 CHRONIC GOUT WITHOUT TOPHUS, UNSPECIFIED CAUSE, UNSPECIFIED SITE: ICD-10-CM

## 2022-12-21 DIAGNOSIS — I50.32 CHRONIC DIASTOLIC CONGESTIVE HEART FAILURE (HCC): ICD-10-CM

## 2022-12-21 DIAGNOSIS — E03.9 ACQUIRED HYPOTHYROIDISM: ICD-10-CM

## 2022-12-21 DIAGNOSIS — I10 ESSENTIAL HYPERTENSION: ICD-10-CM

## 2022-12-21 DIAGNOSIS — E55.9 VITAMIN D DEFICIENCY: ICD-10-CM

## 2022-12-21 DIAGNOSIS — E61.1 IRON DEFICIENCY: ICD-10-CM

## 2022-12-21 RX ORDER — MELATONIN
1000 DAILY
Qty: 100 TABLET | Refills: 3 | Status: SHIPPED | OUTPATIENT
Start: 2022-12-21

## 2022-12-21 RX ORDER — METOPROLOL SUCCINATE 25 MG/1
25 TABLET, EXTENDED RELEASE ORAL DAILY
Qty: 30 TABLET | Refills: 3 | Status: SHIPPED | OUTPATIENT
Start: 2022-12-21

## 2022-12-21 RX ORDER — FERROUS SULFATE 325(65) MG
325 TABLET ORAL
Qty: 30 TABLET | Refills: 2 | Status: SHIPPED | OUTPATIENT
Start: 2022-12-21

## 2022-12-21 RX ORDER — AMLODIPINE BESYLATE 5 MG/1
5 TABLET ORAL DAILY
Qty: 30 TABLET | Refills: 3 | Status: SHIPPED | OUTPATIENT
Start: 2022-12-21

## 2022-12-21 RX ORDER — LEVOTHYROXINE SODIUM 112 UG/1
112 TABLET ORAL
Qty: 90 TABLET | Refills: 1 | Status: SHIPPED | OUTPATIENT
Start: 2022-12-21

## 2022-12-21 RX ORDER — FUROSEMIDE 40 MG/1
40 TABLET ORAL DAILY
Qty: 30 TABLET | Refills: 3 | Status: SHIPPED | OUTPATIENT
Start: 2022-12-21

## 2022-12-21 RX ORDER — ALLOPURINOL 100 MG/1
100 TABLET ORAL 2 TIMES DAILY
Qty: 60 TABLET | Refills: 2 | Status: SHIPPED | OUTPATIENT
Start: 2022-12-21

## 2023-01-17 ENCOUNTER — RA CDI HCC (OUTPATIENT)
Dept: OTHER | Facility: HOSPITAL | Age: 88
End: 2023-01-17

## 2023-01-17 NOTE — PROGRESS NOTES
[facility]  coding opportunities          Chart Reviewed number of suggestions sent to Provider: 1   I13 [id number]    Patients Insurance     Medicare Insurance: [employer]

## 2023-01-24 ENCOUNTER — OFFICE VISIT (OUTPATIENT)
Dept: INTERNAL MEDICINE CLINIC | Facility: CLINIC | Age: 88
End: 2023-01-24

## 2023-01-24 VITALS
SYSTOLIC BLOOD PRESSURE: 138 MMHG | HEART RATE: 85 BPM | HEIGHT: 66 IN | WEIGHT: 205 LBS | BODY MASS INDEX: 32.95 KG/M2 | OXYGEN SATURATION: 97 % | TEMPERATURE: 98.8 F | DIASTOLIC BLOOD PRESSURE: 78 MMHG

## 2023-01-24 DIAGNOSIS — I50.32 CHRONIC DIASTOLIC CONGESTIVE HEART FAILURE (HCC): ICD-10-CM

## 2023-01-24 DIAGNOSIS — N18.4 CKD (CHRONIC KIDNEY DISEASE) STAGE 4, GFR 15-29 ML/MIN (HCC): ICD-10-CM

## 2023-01-24 DIAGNOSIS — E03.9 ACQUIRED HYPOTHYROIDISM: ICD-10-CM

## 2023-01-24 DIAGNOSIS — R54 FRAILTY SYNDROME IN GERIATRIC PATIENT: ICD-10-CM

## 2023-01-24 DIAGNOSIS — I10 ESSENTIAL HYPERTENSION: Primary | ICD-10-CM

## 2023-01-24 DIAGNOSIS — I87.8 VENOUS STASIS: ICD-10-CM

## 2023-01-24 DIAGNOSIS — M1A.9XX0 CHRONIC GOUT WITHOUT TOPHUS, UNSPECIFIED CAUSE, UNSPECIFIED SITE: ICD-10-CM

## 2023-01-24 DIAGNOSIS — E66.9 OBESITY (BMI 30-39.9): ICD-10-CM

## 2023-01-24 NOTE — PROGRESS NOTES
Assessment/Plan:           1  Essential hypertension    2  Acquired hypothyroidism    3  Frailty syndrome in geriatric patient    4  Chronic gout without tophus, unspecified cause, unspecified site    5  CKD (chronic kidney disease) stage 4, GFR 15-29 ml/min ([facility])    6  Venous stasis    7  Chronic diastolic congestive heart failure ([address] )    8  Obesity (BMI 30-39  9)         There are no diagnoses linked to this encounter  No problem-specific Assessment & Plan notes found for this encounter  Subjective:      Patient ID: Ramya Cavazos is a 80 y o  male  Is a 61-year-old gentleman with multiple medical problems who is living alone at home  He has been advised by his physicians as well as social service that he should not be living alone at home trying to take care of his wife who has Alzheimer's dementia  He denies any chest pain or shortness of breath  The following portions of the patient's history were reviewed and updated as appropriate: He  has a past medical history of VAN (acute kidney injury) ([facility] ), Atrial flutter ([address] ), CHF (congestive heart failure) ([address] ), CKD (chronic kidney disease) stage 3, GFR 30-59 ml/min ([address] ), Hypertension, Obstructed, uropathy, Sepsis ([address] ), and Venous stasis    He   Patient Active Problem List    Diagnosis Date Noted   • Venous stasis 04/19/2022   • Frailty syndrome in geriatric patient 04/19/2022   • CKD (chronic kidney disease) stage 4, GFR 15-29 ml/min ([facility]) 05/03/2021   • Obesity (BMI 30-39 9) 03/15/2021   • Chronic gout without tophus 03/15/2021   • Impaired fasting blood sugar 03/15/2021   • Venous stasis dermatitis of both lower extremities 03/15/2021   • Obesity, morbid ([address] ) 03/15/2021   • CHF (congestive heart failure) ([address] )    • PVC (premature ventricular contraction) 08/05/2019   • Chronic diastolic congestive heart failure ([address] ) 06/14/2017   • Leukocytosis 06/14/2017   • Venous stasis ulcer ([address] ) 06/14/2017   • Stage 3b chronic kidney disease ([address] ) 06/14/2017   • Sepsis ([facility] ) 06/13/2017   • Cellulitis of right lower extremity 06/13/2017   • Lower extremity edema 06/13/2017   • Essential hypertension 06/13/2017   • Acquired hypothyroidism 06/13/2017     He  has a past surgical history that includes Prostate surgery; Transurethral resection of prostate; and Tonsillectomy  His family history is not on file  He  reports that he has never smoked  He has never used smokeless tobacco  He reports that he does not drink alcohol and does not use drugs  Current Outpatient Medications   Medication Sig Dispense Refill   • allopurinol (ZYLOPRIM) 100 mg tablet Take 1 tablet (100 mg total) by mouth 2 (two) times a day 60 tablet 2   • amLODIPine (NORVASC) 5 mg tablet Take 1 tablet (5 mg total) by mouth daily 30 tablet 3   • cholecalciferol (VITAMIN D3) 1,000 units tablet Take 1 tablet (1,000 Units total) by mouth daily 100 tablet 3   • ferrous sulfate 325 (65 Fe) mg tablet Take 1 tablet (325 mg total) by mouth daily with breakfast 30 tablet 2   • furosemide (LASIX) 40 mg tablet Take 1 tablet (40 mg total) by mouth daily 30 tablet 3   • levothyroxine (Euthyrox) 112 mcg tablet Take 1 tablet (112 mcg total) by mouth daily in the early morning 90 tablet 1   • metoprolol succinate (TOPROL-XL) 25 mg 24 hr tablet Take 1 tablet (25 mg total) by mouth daily 30 tablet 3   • neomycin-bacitracin-polymyxin (NEOSPORIN) 5-400-5,000 ointment Apply topically 3 (three) times a day for 5 days (Patient not taking: Reported on 1/13/2021) 28 3 g 0     No current facility-administered medications for this visit       Current Outpatient Medications on File Prior to Visit   Medication Sig   • allopurinol (ZYLOPRIM) 100 mg tablet Take 1 tablet (100 mg total) by mouth 2 (two) times a day   • amLODIPine (NORVASC) 5 mg tablet Take 1 tablet (5 mg total) by mouth daily   • cholecalciferol (VITAMIN D3) 1,000 units tablet Take 1 tablet (1,000 Units total) by mouth daily   • ferrous sulfate 325 (65 Fe) mg tablet Take 1 tablet (325 mg total) by mouth daily with breakfast   • furosemide (LASIX) 40 mg tablet Take 1 tablet (40 mg total) by mouth daily   • levothyroxine (Euthyrox) 112 mcg tablet Take 1 tablet (112 mcg total) by mouth daily in the early morning   • metoprolol succinate (TOPROL-XL) 25 mg 24 hr tablet Take 1 tablet (25 mg total) by mouth daily   • neomycin-bacitracin-polymyxin (NEOSPORIN) 5-400-5,000 ointment Apply topically 3 (three) times a day for 5 days (Patient not taking: Reported on 1/13/2021)     No current facility-administered medications on file prior to visit  He has No Known Allergies       Review of Systems   Constitutional: Negative for appetite change, chills, fatigue and fever  HENT: Negative for sore throat and trouble swallowing  Eyes: Negative for redness  Respiratory: Negative for shortness of breath  Cardiovascular: Negative for chest pain and palpitations  Gastrointestinal: Negative for abdominal pain, constipation and diarrhea  Genitourinary: Negative for dysuria and hematuria  Musculoskeletal: Positive for gait problem  Negative for back pain and neck pain  Skin: Negative for rash  Neurological: Negative for seizures, weakness and headaches  Hematological: Negative for adenopathy  Psychiatric/Behavioral: Negative for confusion  The patient is not nervous/anxious  Objective:      /78 (BP Location: Left arm, Patient Position: Sitting, Cuff Size: Large)   Pulse 85   Temp 98 8 °F (37 1 °C) (Temporal)   Ht 5' 6" (1 676 m)   Wt 93 kg (205 lb)   SpO2 97% Comment: RA  BMI 33 09 kg/m²     Results Reviewed     None          No results found for this or any previous visit (from the past 1344 hour(s))  Physical Exam  Constitutional:       General: He is not in acute distress  Appearance: Normal appearance  HENT:      Head: Normocephalic and atraumatic        Nose: Nose normal       Mouth/Throat:      Mouth: Mucous membranes are moist  Eyes:      Extraocular Movements: Extraocular movements intact  Pupils: Pupils are equal, round, and reactive to light  Cardiovascular:      Rate and Rhythm: Normal rate  Pulses: Normal pulses  Heart sounds: Normal heart sounds  No murmur heard  No friction rub  Pulmonary:      Effort: Pulmonary effort is normal  No respiratory distress  Breath sounds: Normal breath sounds  No wheezing  Abdominal:      General: Abdomen is flat  Bowel sounds are normal  There is no distension  Palpations: Abdomen is soft  There is no mass  Tenderness: There is no abdominal tenderness  There is no guarding  Musculoskeletal:         General: Normal range of motion  Cervical back: Normal range of motion  Neurological:      General: No focal deficit present  Mental Status: He is alert and oriented to person, place, and time  Mental status is at baseline  Cranial Nerves: No cranial nerve deficit  Psychiatric:         Mood and Affect: Mood normal          Behavior: Behavior normal        BMI Counseling: Body mass index is 33 09 kg/m²  The BMI is above normal  Nutrition recommendations include reducing portion sizes

## 2023-03-03 ENCOUNTER — APPOINTMENT (OUTPATIENT)
Dept: LAB | Facility: CLINIC | Age: 88
End: 2023-03-03

## 2023-03-03 DIAGNOSIS — I50.32 CHRONIC DIASTOLIC CONGESTIVE HEART FAILURE (HCC): ICD-10-CM

## 2023-03-03 DIAGNOSIS — E03.9 ACQUIRED HYPOTHYROIDISM: ICD-10-CM

## 2023-03-03 LAB
ALBUMIN SERPL BCP-MCNC: 3.5 G/DL (ref 3.5–5)
ALP SERPL-CCNC: 49 U/L (ref 46–116)
ALT SERPL W P-5'-P-CCNC: 14 U/L (ref 12–78)
ANION GAP SERPL CALCULATED.3IONS-SCNC: 6 MMOL/L (ref 4–13)
AST SERPL W P-5'-P-CCNC: 13 U/L (ref 5–45)
BASOPHILS # BLD AUTO: 0.05 THOUSANDS/ÂΜL (ref 0–0.1)
BASOPHILS NFR BLD AUTO: 1 % (ref 0–1)
BILIRUB SERPL-MCNC: 0.62 MG/DL (ref 0.2–1)
BUN SERPL-MCNC: 32 MG/DL (ref 5–25)
CALCIUM SERPL-MCNC: 9.1 MG/DL (ref 8.3–10.1)
CHLORIDE SERPL-SCNC: 106 MMOL/L (ref 96–108)
CO2 SERPL-SCNC: 26 MMOL/L (ref 21–32)
CREAT SERPL-MCNC: 1.79 MG/DL (ref 0.6–1.3)
EOSINOPHIL # BLD AUTO: 0.19 THOUSAND/ÂΜL (ref 0–0.61)
EOSINOPHIL NFR BLD AUTO: 3 % (ref 0–6)
ERYTHROCYTE [DISTWIDTH] IN BLOOD BY AUTOMATED COUNT: 16.1 % (ref 11.6–15.1)
GFR SERPL CREATININE-BSD FRML MDRD: 32 ML/MIN/1.73SQ M
GLUCOSE P FAST SERPL-MCNC: 108 MG/DL (ref 65–99)
HCT VFR BLD AUTO: 31.9 % (ref 36.5–49.3)
HGB BLD-MCNC: 10.4 G/DL (ref 12–17)
IMM GRANULOCYTES # BLD AUTO: 0.02 THOUSAND/UL (ref 0–0.2)
IMM GRANULOCYTES NFR BLD AUTO: 0 % (ref 0–2)
LYMPHOCYTES # BLD AUTO: 1.6 THOUSANDS/ÂΜL (ref 0.6–4.47)
LYMPHOCYTES NFR BLD AUTO: 28 % (ref 14–44)
MCH RBC QN AUTO: 34 PG (ref 26.8–34.3)
MCHC RBC AUTO-ENTMCNC: 32.6 G/DL (ref 31.4–37.4)
MCV RBC AUTO: 104 FL (ref 82–98)
MONOCYTES # BLD AUTO: 0.49 THOUSAND/ÂΜL (ref 0.17–1.22)
MONOCYTES NFR BLD AUTO: 9 % (ref 4–12)
NEUTROPHILS # BLD AUTO: 3.36 THOUSANDS/ÂΜL (ref 1.85–7.62)
NEUTS SEG NFR BLD AUTO: 59 % (ref 43–75)
NRBC BLD AUTO-RTO: 0 /100 WBCS
PLATELET # BLD AUTO: 182 THOUSANDS/UL (ref 149–390)
PMV BLD AUTO: 11.2 FL (ref 8.9–12.7)
POTASSIUM SERPL-SCNC: 3.7 MMOL/L (ref 3.5–5.3)
PROT SERPL-MCNC: 6.6 G/DL (ref 6.4–8.4)
RBC # BLD AUTO: 3.06 MILLION/UL (ref 3.88–5.62)
SODIUM SERPL-SCNC: 138 MMOL/L (ref 135–147)
T4 FREE SERPL-MCNC: 0.81 NG/DL (ref 0.76–1.46)
TSH SERPL DL<=0.05 MIU/L-ACNC: 6.38 UIU/ML (ref 0.45–4.5)
WBC # BLD AUTO: 5.71 THOUSAND/UL (ref 4.31–10.16)

## 2023-04-04 ENCOUNTER — OFFICE VISIT (OUTPATIENT)
Dept: INTERNAL MEDICINE CLINIC | Facility: CLINIC | Age: 88
End: 2023-04-04

## 2023-04-04 VITALS
OXYGEN SATURATION: 97 % | TEMPERATURE: 98 F | HEART RATE: 84 BPM | DIASTOLIC BLOOD PRESSURE: 70 MMHG | BODY MASS INDEX: 31.82 KG/M2 | SYSTOLIC BLOOD PRESSURE: 136 MMHG | WEIGHT: 198 LBS | HEIGHT: 66 IN

## 2023-04-04 DIAGNOSIS — E66.9 OBESITY (BMI 30-39.9): ICD-10-CM

## 2023-04-04 DIAGNOSIS — I87.8 VENOUS STASIS: ICD-10-CM

## 2023-04-04 DIAGNOSIS — E66.09 CLASS 1 OBESITY DUE TO EXCESS CALORIES WITH SERIOUS COMORBIDITY AND BODY MASS INDEX (BMI) OF 31.0 TO 31.9 IN ADULT: ICD-10-CM

## 2023-04-04 DIAGNOSIS — E03.9 ACQUIRED HYPOTHYROIDISM: ICD-10-CM

## 2023-04-04 DIAGNOSIS — I50.32 CHRONIC DIASTOLIC CONGESTIVE HEART FAILURE (HCC): ICD-10-CM

## 2023-04-04 DIAGNOSIS — N18.32 STAGE 3B CHRONIC KIDNEY DISEASE (HCC): ICD-10-CM

## 2023-04-04 DIAGNOSIS — R73.01 IMPAIRED FASTING BLOOD SUGAR: ICD-10-CM

## 2023-04-04 DIAGNOSIS — M1A.9XX0 CHRONIC GOUT WITHOUT TOPHUS, UNSPECIFIED CAUSE, UNSPECIFIED SITE: ICD-10-CM

## 2023-04-04 DIAGNOSIS — I10 ESSENTIAL HYPERTENSION: Primary | ICD-10-CM

## 2023-04-04 PROBLEM — I83.009 VENOUS STASIS ULCER (HCC): Status: RESOLVED | Noted: 2017-06-14 | Resolved: 2023-04-04

## 2023-04-04 PROBLEM — N18.4 CKD (CHRONIC KIDNEY DISEASE) STAGE 4, GFR 15-29 ML/MIN (HCC): Status: RESOLVED | Noted: 2021-05-03 | Resolved: 2023-04-04

## 2023-04-04 PROBLEM — L03.115 CELLULITIS OF RIGHT LOWER EXTREMITY: Status: RESOLVED | Noted: 2017-06-13 | Resolved: 2023-04-04

## 2023-04-04 PROBLEM — L97.909 VENOUS STASIS ULCER (HCC): Status: RESOLVED | Noted: 2017-06-14 | Resolved: 2023-04-04

## 2023-04-04 PROBLEM — A41.9 SEPSIS (HCC): Status: RESOLVED | Noted: 2017-06-13 | Resolved: 2023-04-04

## 2023-04-04 NOTE — PROGRESS NOTES
Assessment/Plan:           1  Essential hypertension  Comments:  Continue amlodipine 5 mg daily and metoprolol  Orders:  -     CBC and differential; Future  -     Comprehensive metabolic panel; Future  -     Urinalysis with microscopic    2  Acquired hypothyroidism  -     T4, free; Future  -     TSH, 3rd generation; Future    3  Chronic gout without tophus, unspecified cause, unspecified site  Comments:  Advised to continue allopurinol 200 mg daily  Had attack when he decreased the dose  Orders:  -     Uric acid; Future    4  Chronic diastolic congestive heart failure (HCC)  Comments:  Stable on metoprolol and furosemide  5  Obesity (BMI 30-39 9)    6  Class 1 obesity due to excess calories with serious comorbidity and body mass index (BMI) of 31 0 to 31 9 in adult    7  Venous stasis    8  Impaired fasting blood sugar    9  Stage 3b chronic kidney disease ([facility] [address] )         1  Essential hypertension      2  Acquired hypothyroidism      3  Chronic gout without tophus, unspecified cause, unspecified site      4  Chronic diastolic congestive heart failure (HCC)      5  Obesity (BMI 30-39  9)         No problem-specific Assessment & Plan notes found for this encounter  Subjective:      Patient ID: Kate Rivera is a 80 y o  male  Is a 80-year-old gentleman with a history of CHF hypertension hypothyroidism gout chronic kidney disease stage IV impaired fasting sugar venous stasis dermatitis SVT BPH s/p TURP who continues to live at home and take care of his wife who has moderate dementia  He has been advised to get assistance at a facility but he continues to want to live at his home    Social service has been consulted in the past       The following portions of the patient's history were reviewed and updated as appropriate: He  has a past medical history of VAN (acute kidney injury) ([facility] [address] ), Atrial flutter ([facility] [address] ), CHF (congestive heart failure) ([facility] [address] ), CKD (chronic kidney disease) stage 3, GFR 30-59 ml/min ([facility] [address] ), CKD (chronic kidney disease) stage 4, GFR 15-29 ml/min ([facility]) (5/3/2021), Hypertension, Obstructed, uropathy, Sepsis ([place] [address] ), Venous stasis, and Venous stasis ulcer ([place] [address] ) (6/14/2017)  He   Patient Active Problem List    Diagnosis Date Noted   • Venous stasis 04/19/2022   • Frailty syndrome in geriatric patient 04/19/2022   • Obesity (BMI 30-39 9) 03/15/2021   • Chronic gout without tophus 03/15/2021   • Impaired fasting blood sugar 03/15/2021   • Venous stasis dermatitis of both lower extremities 03/15/2021   • Class 1 obesity due to excess calories with serious comorbidity and body mass index (BMI) of 31 0 to 31 9 in adult 03/15/2021   • CHF (congestive heart failure) ([facility])    • PVC (premature ventricular contraction) 08/05/2019   • Chronic diastolic congestive heart failure ([place] [address] ) 06/14/2017   • Leukocytosis 06/14/2017   • Stage 3b chronic kidney disease ([place] [address] ) 06/14/2017   • Lower extremity edema 06/13/2017   • Essential hypertension 06/13/2017   • Acquired hypothyroidism 06/13/2017     He  has a past surgical history that includes Prostate surgery; Transurethral resection of prostate; and Tonsillectomy  His family history is not on file  He  reports that he has never smoked  He has never used smokeless tobacco  He reports that he does not drink alcohol and does not use drugs    Current Outpatient Medications   Medication Sig Dispense Refill   • allopurinol (ZYLOPRIM) 100 mg tablet Take 1 tablet (100 mg total) by mouth 2 (two) times a day 60 tablet 2   • amLODIPine (NORVASC) 5 mg tablet Take 1 tablet (5 mg total) by mouth daily 30 tablet 3   • cholecalciferol (VITAMIN D3) 1,000 units tablet Take 1 tablet (1,000 Units total) by mouth daily 100 tablet 3   • ferrous sulfate 325 (65 Fe) mg tablet Take 1 tablet (325 mg total) by mouth daily with breakfast 30 tablet 2   • furosemide (LASIX) 40 mg tablet Take 1 tablet (40 mg total) by mouth daily 30 tablet 3   • levothyroxine (Euthyrox) 112 mcg tablet Take 1 tablet (112 mcg total) by mouth daily in the early morning 90 tablet 1   • metoprolol succinate (TOPROL-XL) 25 mg 24 hr tablet Take 1 tablet (25 mg total) by mouth daily 30 tablet 3   • neomycin-bacitracin-polymyxin (NEOSPORIN) 5-400-5,000 ointment Apply topically 3 (three) times a day for 5 days (Patient not taking: Reported on 1/13/2021) 28 3 g 0     No current facility-administered medications for this visit  Current Outpatient Medications on File Prior to Visit   Medication Sig   • allopurinol (ZYLOPRIM) 100 mg tablet Take 1 tablet (100 mg total) by mouth 2 (two) times a day   • amLODIPine (NORVASC) 5 mg tablet Take 1 tablet (5 mg total) by mouth daily   • cholecalciferol (VITAMIN D3) 1,000 units tablet Take 1 tablet (1,000 Units total) by mouth daily   • ferrous sulfate 325 (65 Fe) mg tablet Take 1 tablet (325 mg total) by mouth daily with breakfast   • furosemide (LASIX) 40 mg tablet Take 1 tablet (40 mg total) by mouth daily   • levothyroxine (Euthyrox) 112 mcg tablet Take 1 tablet (112 mcg total) by mouth daily in the early morning   • metoprolol succinate (TOPROL-XL) 25 mg 24 hr tablet Take 1 tablet (25 mg total) by mouth daily   • neomycin-bacitracin-polymyxin (NEOSPORIN) 5-400-5,000 ointment Apply topically 3 (three) times a day for 5 days (Patient not taking: Reported on 1/13/2021)     No current facility-administered medications on file prior to visit  He has No Known Allergies       Review of Systems   Constitutional: Negative for appetite change, chills, fatigue and fever  HENT: Negative for sore throat and trouble swallowing  Eyes: Negative for redness  Respiratory: Negative for shortness of breath  Cardiovascular: Negative for chest pain and palpitations  Gastrointestinal: Negative for abdominal pain, constipation and diarrhea  Genitourinary: Negative for dysuria and hematuria  Musculoskeletal: Positive for arthralgias and gait problem  Negative for back pain and neck pain     Skin: "Negative for rash  Neurological: Negative for seizures, weakness and headaches  Hematological: Negative for adenopathy  Psychiatric/Behavioral: Negative for confusion  The patient is not nervous/anxious            Objective:      /70 (BP Location: Left arm, Patient Position: Sitting, Cuff Size: Standard)   Pulse 84   Temp 98 °F (36 7 °C) (Temporal)   Ht 5' 6\" (1 676 m)   Wt 89 8 kg (198 lb)   SpO2 97%   BMI 31 96 kg/m²     Results Reviewed     None          Recent Results (from the past 1344 hour(s))   T4, free    Collection Time: 03/03/23 10:14 AM   Result Value Ref Range    Free T4 0 81 0 76 - 1 46 ng/dL   TSH, 3rd generation    Collection Time: 03/03/23 10:14 AM   Result Value Ref Range    TSH 3RD GENERATON 6 380 (H) 0 450 - 4 500 uIU/mL   Comprehensive metabolic panel    Collection Time: 03/03/23 10:14 AM   Result Value Ref Range    Sodium 138 135 - 147 mmol/L    Potassium 3 7 3 5 - 5 3 mmol/L    Chloride 106 96 - 108 mmol/L    CO2 26 21 - 32 mmol/L    ANION GAP 6 4 - 13 mmol/L    BUN 32 (H) 5 - 25 mg/dL    Creatinine 1 79 (H) 0 60 - 1 30 mg/dL    Glucose, Fasting 108 (H) 65 - 99 mg/dL    Calcium 9 1 8 3 - 10 1 mg/dL    AST 13 5 - 45 U/L    ALT 14 12 - 78 U/L    Alkaline Phosphatase 49 46 - 116 U/L    Total Protein 6 6 6 4 - 8 4 g/dL    Albumin 3 5 3 5 - 5 0 g/dL    Total Bilirubin 0 62 0 20 - 1 00 mg/dL    eGFR 32 ml/min/1 73sq m   CBC and differential    Collection Time: 03/03/23 10:14 AM   Result Value Ref Range    WBC 5 71 4 31 - 10 16 Thousand/uL    RBC 3 06 (L) 3 88 - 5 62 Million/uL    Hemoglobin 10 4 (L) 12 0 - 17 0 g/dL    Hematocrit 31 9 (L) 36 5 - 49 3 %     (H) 82 - 98 fL    MCH 34 0 26 8 - 34 3 pg    MCHC 32 6 31 4 - 37 4 g/dL    RDW 16 1 (H) 11 6 - 15 1 %    MPV 11 2 8 9 - 12 7 fL    Platelets 934 211 - 528 Thousands/uL    nRBC 0 /100 WBCs    Neutrophils Relative 59 43 - 75 %    Immat GRANS % 0 0 - 2 %    Lymphocytes Relative 28 14 - 44 %    Monocytes Relative 9 4 - 12 %    " Eosinophils Relative 3 0 - 6 %    Basophils Relative 1 0 - 1 %    Neutrophils Absolute 3 36 1 85 - 7 62 Thousands/µL    Immature Grans Absolute 0 02 0 00 - 0 20 Thousand/uL    Lymphocytes Absolute 1 60 0 60 - 4 47 Thousands/µL    Monocytes Absolute 0 49 0 17 - 1 22 Thousand/µL    Eosinophils Absolute 0 19 0 00 - 0 61 Thousand/µL    Basophils Absolute 0 05 0 00 - 0 10 Thousands/µL        Physical Exam  Constitutional:       General: He is not in acute distress  HENT:      Head: Normocephalic and atraumatic  Nose: Nose normal       Mouth/Throat:      Mouth: Mucous membranes are moist    Eyes:      Extraocular Movements: Extraocular movements intact  Pupils: Pupils are equal, round, and reactive to light  Cardiovascular:      Rate and Rhythm: Normal rate and regular rhythm  Pulses: Normal pulses  Heart sounds: Murmur heard  No friction rub  Pulmonary:      Effort: Pulmonary effort is normal  No respiratory distress  Breath sounds: Normal breath sounds  No wheezing  Abdominal:      General: Abdomen is flat  Bowel sounds are normal  There is no distension  Palpations: Abdomen is soft  There is no mass  Tenderness: There is no abdominal tenderness  There is no guarding  Musculoskeletal:         General: Normal range of motion  Cervical back: Normal range of motion  Neurological:      General: No focal deficit present  Mental Status: He is alert and oriented to person, place, and time  Mental status is at baseline  Cranial Nerves: No cranial nerve deficit        Gait: Gait abnormal    Psychiatric:         Mood and Affect: Mood normal          Behavior: Behavior normal

## 2023-06-30 ENCOUNTER — APPOINTMENT (OUTPATIENT)
Dept: LAB | Facility: CLINIC | Age: 88
End: 2023-06-30
Payer: MEDICARE

## 2023-06-30 DIAGNOSIS — M1A.9XX0 CHRONIC GOUT WITHOUT TOPHUS, UNSPECIFIED CAUSE, UNSPECIFIED SITE: ICD-10-CM

## 2023-06-30 DIAGNOSIS — E03.9 ACQUIRED HYPOTHYROIDISM: ICD-10-CM

## 2023-06-30 DIAGNOSIS — I10 ESSENTIAL HYPERTENSION: ICD-10-CM

## 2023-06-30 LAB
ALBUMIN SERPL BCP-MCNC: 3.6 G/DL (ref 3.5–5)
ALP SERPL-CCNC: 60 U/L (ref 46–116)
ALT SERPL W P-5'-P-CCNC: 13 U/L (ref 12–78)
ANION GAP SERPL CALCULATED.3IONS-SCNC: 5 MMOL/L
AST SERPL W P-5'-P-CCNC: 13 U/L (ref 5–45)
BACTERIA UR QL AUTO: NORMAL /HPF
BASOPHILS # BLD AUTO: 0.05 THOUSANDS/ÂΜL (ref 0–0.1)
BASOPHILS NFR BLD AUTO: 1 % (ref 0–1)
BILIRUB SERPL-MCNC: 0.74 MG/DL (ref 0.2–1)
BILIRUB UR QL STRIP: NEGATIVE
BUN SERPL-MCNC: 35 MG/DL (ref 5–25)
CALCIUM SERPL-MCNC: 8.7 MG/DL (ref 8.3–10.1)
CHLORIDE SERPL-SCNC: 107 MMOL/L (ref 96–108)
CLARITY UR: CLEAR
CO2 SERPL-SCNC: 28 MMOL/L (ref 21–32)
COLOR UR: COLORLESS
CREAT SERPL-MCNC: 2.16 MG/DL (ref 0.6–1.3)
EOSINOPHIL # BLD AUTO: 0.22 THOUSAND/ÂΜL (ref 0–0.61)
EOSINOPHIL NFR BLD AUTO: 5 % (ref 0–6)
ERYTHROCYTE [DISTWIDTH] IN BLOOD BY AUTOMATED COUNT: 16.7 % (ref 11.6–15.1)
GFR SERPL CREATININE-BSD FRML MDRD: 25 ML/MIN/1.73SQ M
GLUCOSE P FAST SERPL-MCNC: 100 MG/DL (ref 65–99)
GLUCOSE UR STRIP-MCNC: NEGATIVE MG/DL
HCT VFR BLD AUTO: 30 % (ref 36.5–49.3)
HGB BLD-MCNC: 9.8 G/DL (ref 12–17)
HGB UR QL STRIP.AUTO: NEGATIVE
IMM GRANULOCYTES # BLD AUTO: 0.01 THOUSAND/UL (ref 0–0.2)
IMM GRANULOCYTES NFR BLD AUTO: 0 % (ref 0–2)
KETONES UR STRIP-MCNC: NEGATIVE MG/DL
LEUKOCYTE ESTERASE UR QL STRIP: NEGATIVE
LYMPHOCYTES # BLD AUTO: 1.28 THOUSANDS/ÂΜL (ref 0.6–4.47)
LYMPHOCYTES NFR BLD AUTO: 27 % (ref 14–44)
MCH RBC QN AUTO: 34.1 PG (ref 26.8–34.3)
MCHC RBC AUTO-ENTMCNC: 32.7 G/DL (ref 31.4–37.4)
MCV RBC AUTO: 105 FL (ref 82–98)
MONOCYTES # BLD AUTO: 0.47 THOUSAND/ÂΜL (ref 0.17–1.22)
MONOCYTES NFR BLD AUTO: 10 % (ref 4–12)
NEUTROPHILS # BLD AUTO: 2.79 THOUSANDS/ÂΜL (ref 1.85–7.62)
NEUTS SEG NFR BLD AUTO: 57 % (ref 43–75)
NITRITE UR QL STRIP: NEGATIVE
NON-SQ EPI CELLS URNS QL MICRO: NORMAL /HPF
NRBC BLD AUTO-RTO: 0 /100 WBCS
PH UR STRIP.AUTO: 6 [PH]
PLATELET # BLD AUTO: 156 THOUSANDS/UL (ref 149–390)
PMV BLD AUTO: 11 FL (ref 8.9–12.7)
POTASSIUM SERPL-SCNC: 3.9 MMOL/L (ref 3.5–5.3)
PROT SERPL-MCNC: 7.1 G/DL (ref 6.4–8.4)
PROT UR STRIP-MCNC: NEGATIVE MG/DL
RBC # BLD AUTO: 2.87 MILLION/UL (ref 3.88–5.62)
RBC #/AREA URNS AUTO: NORMAL /HPF
SODIUM SERPL-SCNC: 140 MMOL/L (ref 135–147)
SP GR UR STRIP.AUTO: 1.01 (ref 1–1.03)
T4 FREE SERPL-MCNC: 0.83 NG/DL (ref 0.61–1.12)
TSH SERPL DL<=0.05 MIU/L-ACNC: 6.4 UIU/ML (ref 0.45–4.5)
URATE SERPL-MCNC: 6 MG/DL (ref 3.5–8.5)
UROBILINOGEN UR STRIP-ACNC: <2 MG/DL
WBC # BLD AUTO: 4.82 THOUSAND/UL (ref 4.31–10.16)
WBC #/AREA URNS AUTO: NORMAL /HPF

## 2023-06-30 PROCEDURE — 84439 ASSAY OF FREE THYROXINE: CPT

## 2023-06-30 PROCEDURE — 84550 ASSAY OF BLOOD/URIC ACID: CPT

## 2023-06-30 PROCEDURE — 84443 ASSAY THYROID STIM HORMONE: CPT

## 2023-06-30 PROCEDURE — 80053 COMPREHEN METABOLIC PANEL: CPT

## 2023-06-30 PROCEDURE — 85025 COMPLETE CBC W/AUTO DIFF WBC: CPT

## 2023-06-30 PROCEDURE — 36415 COLL VENOUS BLD VENIPUNCTURE: CPT

## 2023-07-11 ENCOUNTER — OFFICE VISIT (OUTPATIENT)
Dept: INTERNAL MEDICINE CLINIC | Facility: CLINIC | Age: 88
End: 2023-07-11
Payer: MEDICARE

## 2023-07-11 VITALS
TEMPERATURE: 98.2 F | OXYGEN SATURATION: 98 % | BODY MASS INDEX: 34.44 KG/M2 | DIASTOLIC BLOOD PRESSURE: 72 MMHG | WEIGHT: 213.4 LBS | SYSTOLIC BLOOD PRESSURE: 136 MMHG | HEART RATE: 70 BPM

## 2023-07-11 DIAGNOSIS — E66.09 CLASS 1 OBESITY DUE TO EXCESS CALORIES WITH SERIOUS COMORBIDITY AND BODY MASS INDEX (BMI) OF 31.0 TO 31.9 IN ADULT: ICD-10-CM

## 2023-07-11 DIAGNOSIS — I50.32 CHRONIC DIASTOLIC CONGESTIVE HEART FAILURE (HCC): Primary | ICD-10-CM

## 2023-07-11 DIAGNOSIS — E03.9 ACQUIRED HYPOTHYROIDISM: ICD-10-CM

## 2023-07-11 DIAGNOSIS — M1A.9XX0 CHRONIC GOUT WITHOUT TOPHUS, UNSPECIFIED CAUSE, UNSPECIFIED SITE: ICD-10-CM

## 2023-07-11 DIAGNOSIS — N18.4 CKD (CHRONIC KIDNEY DISEASE) STAGE 4, GFR 15-29 ML/MIN (HCC): ICD-10-CM

## 2023-07-11 DIAGNOSIS — E55.9 VITAMIN D DEFICIENCY: ICD-10-CM

## 2023-07-11 DIAGNOSIS — E61.1 IRON DEFICIENCY: ICD-10-CM

## 2023-07-11 DIAGNOSIS — E66.9 OBESITY (BMI 30-39.9): ICD-10-CM

## 2023-07-11 DIAGNOSIS — I10 ESSENTIAL HYPERTENSION: ICD-10-CM

## 2023-07-11 PROCEDURE — 99214 OFFICE O/P EST MOD 30 MIN: CPT | Performed by: INTERNAL MEDICINE

## 2023-07-11 NOTE — PROGRESS NOTES
Name: Lucía Fuentes      : 6/3/1931      MRN: [id number]  Encounter Provider: Jennifer Cavanaugh MD  Encounter Date: 2023   Encounter department: [facility]      1. Chronic diastolic congestive heart failure (HCC)  Iron Saturation %    Ferritin    Comprehensive metabolic panel      2. Chronic gout without tophus, unspecified cause, unspecified site        3. Essential hypertension        4. Vitamin D deficiency        5. Acquired hypothyroidism        6. Obesity (BMI 30-39.9)        7. CKD (chronic kidney disease) stage 4, GFR 15-29 ml/min (HCC)        8. Class 1 obesity due to excess calories with serious comorbidity and body mass index (BMI) of 31.0 to 31.9 in adult        9. Iron deficiency  Iron Saturation %    Ferritin          Assessment & Plan     1. Chronic diastolic congestive heart failure (HCC)  -     Iron Saturation %; Future  -     Ferritin; Future  -     Comprehensive metabolic panel; Future    2. Chronic gout without tophus, unspecified cause, unspecified site    3. Essential hypertension    4. Vitamin D deficiency    5. Acquired hypothyroidism    6. Obesity (BMI 30-39.9)    7. CKD (chronic kidney disease) stage 4, GFR 15-29 ml/min (HCC)    8. Class 1 obesity due to excess calories with serious comorbidity and body mass index (BMI) of 31.0 to 31.9 in adult    9. Iron deficiency  -     Iron Saturation %; Future  -     Ferritin; Future           Subjective      80 y.o. M w/ PMH of HTN, hypothyroidism, gout, CHF presents to office for follow-up appt. Pt has a history of venous stasis in his legs and recently hit his leg on a trailer hitch while walking. This occurred , 23. Today in the office he has a 2x2 cm scab that is oozing. It does not appear inflamed and is not indurated or warm to the touch. It does not appear infected and the patient is having no fever, chills, or other systemic symptoms of infection.  He was instructed to keep the wound covered with a dressing that is clean and dry and to elevate his legs to alleviate some of the venous stasis. We discussed the decline in Haris's kidney function, GFR declined from 32 to 25. Will continue to monitor. Follow-up appt in September. Gout is stable on current dose of allopurinol with no acute flares. Review of Systems   Constitutional: Negative. HENT: Negative. Eyes: Negative. Respiratory: Negative. Cardiovascular: Negative. Gastrointestinal: Negative. Endocrine: Negative. Genitourinary: Negative. Musculoskeletal: Negative. Skin: Positive for wound (RLE 2x2 cm superficial wound). Neurological: Negative. Hematological: Negative. Current Outpatient Medications on File Prior to Visit   Medication Sig   • allopurinol (ZYLOPRIM) 100 mg tablet Take 1 tablet (100 mg total) by mouth 2 (two) times a day   • amLODIPine (NORVASC) 5 mg tablet Take 1 tablet (5 mg total) by mouth daily   • cholecalciferol (VITAMIN D3) 1,000 units tablet Take 1 tablet (1,000 Units total) by mouth daily   • ferrous sulfate 325 (65 Fe) mg tablet Take 1 tablet (325 mg total) by mouth daily with breakfast   • furosemide (LASIX) 40 mg tablet Take 1 tablet (40 mg total) by mouth daily   • levothyroxine (Euthyrox) 112 mcg tablet Take 1 tablet (112 mcg total) by mouth daily in the early morning   • metoprolol succinate (TOPROL-XL) 25 mg 24 hr tablet Take 1 tablet (25 mg total) by mouth daily   • neomycin-bacitracin-polymyxin (NEOSPORIN) 5-400-5,000 ointment Apply topically 3 (three) times a day for 5 days (Patient not taking: Reported on 1/13/2021)       Objective     /72 (BP Location: Left arm, Patient Position: Sitting, Cuff Size: Standard)   Pulse 70   Temp 98.2 °F (36.8 °C) (Temporal)   Wt 96.8 kg (213 lb 6.4 oz)   SpO2 98%   BMI 34.44 kg/m²     Physical Exam  Vitals reviewed. Constitutional:       Appearance: Normal appearance.    HENT:      Head: Normocephalic and atraumatic. Right Ear: Tympanic membrane normal.      Left Ear: Tympanic membrane normal.      Nose: Nose normal.      Mouth/Throat:      Mouth: Mucous membranes are moist.      Pharynx: Oropharynx is clear. Eyes:      Extraocular Movements: Extraocular movements intact. Conjunctiva/sclera: Conjunctivae normal.      Pupils: Pupils are equal, round, and reactive to light. Cardiovascular:      Rate and Rhythm: Normal rate and regular rhythm. Pulses: Normal pulses. Heart sounds: Normal heart sounds. Pulmonary:      Effort: Pulmonary effort is normal.      Breath sounds: Normal breath sounds. Abdominal:      General: Abdomen is flat. Bowel sounds are normal.      Palpations: Abdomen is soft. Musculoskeletal:      Right lower leg: No edema. Left lower leg: No edema. Lymphadenopathy:      Cervical: No cervical adenopathy. Skin:     General: Skin is warm and dry. Findings: Abrasion present. Neurological:      General: No focal deficit present. Mental Status: He is alert and oriented to person, place, and time.        Nito Giang MD  [employer] [facility]  PGY-1

## 2023-08-18 DIAGNOSIS — I10 ESSENTIAL HYPERTENSION: ICD-10-CM

## 2023-08-18 DIAGNOSIS — E61.1 IRON DEFICIENCY: ICD-10-CM

## 2023-08-18 DIAGNOSIS — I50.32 CHRONIC DIASTOLIC CONGESTIVE HEART FAILURE (HCC): ICD-10-CM

## 2023-08-18 DIAGNOSIS — M1A.9XX0 CHRONIC GOUT WITHOUT TOPHUS, UNSPECIFIED CAUSE, UNSPECIFIED SITE: ICD-10-CM

## 2023-08-18 DIAGNOSIS — E55.9 VITAMIN D DEFICIENCY: ICD-10-CM

## 2023-08-18 DIAGNOSIS — E03.9 ACQUIRED HYPOTHYROIDISM: ICD-10-CM

## 2023-08-18 RX ORDER — FUROSEMIDE 40 MG/1
40 TABLET ORAL DAILY
Qty: 30 TABLET | Refills: 3 | Status: SHIPPED | OUTPATIENT
Start: 2023-08-18

## 2023-08-18 RX ORDER — MELATONIN
1000 DAILY
Qty: 100 TABLET | Refills: 3 | Status: SHIPPED | OUTPATIENT
Start: 2023-08-18

## 2023-08-18 RX ORDER — LEVOTHYROXINE SODIUM 112 UG/1
112 TABLET ORAL
Qty: 90 TABLET | Refills: 1 | Status: SHIPPED | OUTPATIENT
Start: 2023-08-18

## 2023-08-18 RX ORDER — FERROUS SULFATE 325(65) MG
325 TABLET ORAL
Qty: 30 TABLET | Refills: 2 | Status: SHIPPED | OUTPATIENT
Start: 2023-08-18

## 2023-08-18 RX ORDER — ALLOPURINOL 100 MG/1
100 TABLET ORAL 2 TIMES DAILY
Qty: 60 TABLET | Refills: 2 | Status: SHIPPED | OUTPATIENT
Start: 2023-08-18

## 2023-08-18 RX ORDER — AMLODIPINE BESYLATE 5 MG/1
5 TABLET ORAL DAILY
Qty: 30 TABLET | Refills: 3 | Status: SHIPPED | OUTPATIENT
Start: 2023-08-18

## 2023-08-18 RX ORDER — METOPROLOL SUCCINATE 25 MG/1
25 TABLET, EXTENDED RELEASE ORAL DAILY
Qty: 30 TABLET | Refills: 3 | Status: SHIPPED | OUTPATIENT
Start: 2023-08-18

## 2023-08-18 NOTE — TELEPHONE ENCOUNTER
Refill request    Allopurinol   Amlodipine  Vitamin D  Ferrous Sulfate   Furosemide  Levothyroxine   Metorolol     Pharm[facility]: [facility]     LA: 7/11/23  NA: 10/3/23

## 2023-09-26 ENCOUNTER — RA CDI HCC (OUTPATIENT)
Dept: OTHER | Facility: HOSPITAL | Age: 88
End: 2023-09-26

## 2023-09-26 NOTE — PROGRESS NOTES
[address] [facility] coding opportunities          Chart Reviewed number of suggestions sent to Provider: 1   I13.0    Patients Insurance     Medicare Insurance: [employer] - - -

## 2023-10-03 ENCOUNTER — OFFICE VISIT (OUTPATIENT)
Dept: INTERNAL MEDICINE CLINIC | Facility: CLINIC | Age: 88
End: 2023-10-03
Payer: MEDICARE

## 2023-10-03 VITALS
BODY MASS INDEX: 33.75 KG/M2 | HEART RATE: 73 BPM | TEMPERATURE: 97.8 F | SYSTOLIC BLOOD PRESSURE: 130 MMHG | HEIGHT: 66 IN | WEIGHT: 210 LBS | DIASTOLIC BLOOD PRESSURE: 78 MMHG | OXYGEN SATURATION: 98 %

## 2023-10-03 DIAGNOSIS — Z23 NEEDS FLU SHOT: ICD-10-CM

## 2023-10-03 DIAGNOSIS — N18.4 CKD (CHRONIC KIDNEY DISEASE) STAGE 4, GFR 15-29 ML/MIN (HCC): Primary | ICD-10-CM

## 2023-10-03 DIAGNOSIS — Z00.00 MEDICARE ANNUAL WELLNESS VISIT, SUBSEQUENT: ICD-10-CM

## 2023-10-03 DIAGNOSIS — I10 ESSENTIAL HYPERTENSION: ICD-10-CM

## 2023-10-03 DIAGNOSIS — E03.9 ACQUIRED HYPOTHYROIDISM: ICD-10-CM

## 2023-10-03 DIAGNOSIS — E61.1 IRON DEFICIENCY: ICD-10-CM

## 2023-10-03 DIAGNOSIS — I50.32 CHRONIC DIASTOLIC CONGESTIVE HEART FAILURE (HCC): ICD-10-CM

## 2023-10-03 DIAGNOSIS — M1A.9XX0 CHRONIC GOUT WITHOUT TOPHUS, UNSPECIFIED CAUSE, UNSPECIFIED SITE: ICD-10-CM

## 2023-10-03 PROCEDURE — 99214 OFFICE O/P EST MOD 30 MIN: CPT | Performed by: INTERNAL MEDICINE

## 2023-10-03 PROCEDURE — G0008 ADMIN INFLUENZA VIRUS VAC: HCPCS

## 2023-10-03 PROCEDURE — G0439 PPPS, SUBSEQ VISIT: HCPCS | Performed by: INTERNAL MEDICINE

## 2023-10-03 PROCEDURE — 90662 IIV NO PRSV INCREASED AG IM: CPT

## 2023-10-03 NOTE — PROGRESS NOTES
Assessment and Plan:     Problem List Items Addressed This Visit        Endocrine    Acquired hypothyroidism       Cardiovascular and Mediastinum    Essential hypertension    Chronic diastolic congestive heart failure ([address])   Other Visit Diagnoses     CKD (chronic kidney disease) stage 4, GFR 15-29 ml/min ([facility])    -  Primary    Iron deficiency        Relevant Orders    CBC and differential    Medicare annual wellness visit, subsequent        Needs flu shot        Relevant Orders    influenza vaccine, high-dose, PF 0.7 mL (FLUZONE HIGH-DOSE) (Completed)            1. CKD (chronic kidney disease) stage 4, GFR 15-29 ml/min ([address])        2. Iron deficiency  CBC and differential      3. Essential hypertension        4. Chronic diastolic congestive heart failure ([address])        5. Acquired hypothyroidism        6. Medicare annual wellness visit, subsequent        7. Needs flu shot  influenza vaccine, high-dose, PF 0.7 mL (FLUZONE HIGH-DOSE)          Preventive health issues were discussed with patient, and age appropriate screening tests were ordered as noted in patient's After Visit Summary. Personalized health advice and appropriate referrals for health education or preventive services given if needed, as noted in patient's After Visit Summary. History of Present Illness:     Patient presents for a Medicare Wellness Visit    Is a 80-year-old gentleman with a history of chronic kidney disease congestive heart failure obesity hypertension BPH TURP. Denies any chest pain or shortness of breath. Patient Care Team:  Patrick Tabares MD as PCP - [address] [place] [address]     Review of Systems:     Review of Systems   Constitutional: Negative for appetite change, chills, fatigue and fever. HENT: Negative for sore throat and trouble swallowing. Eyes: Negative for redness. Respiratory: Negative for shortness of breath. Cardiovascular: Negative for chest pain and palpitations.    Gastrointestinal: Negative for abdominal pain, constipation and diarrhea. Genitourinary: Negative for dysuria and hematuria. Musculoskeletal: Negative for back pain and neck pain. Skin: Negative for rash. Neurological: Negative for seizures, weakness and headaches. Hematological: Negative for adenopathy. Psychiatric/Behavioral: Negative for confusion. The patient is not nervous/anxious. Problem List:     Patient Active Problem List   Diagnosis   • Lower extremity edema   • Essential hypertension   • Acquired hypothyroidism   • Chronic diastolic congestive heart failure (HCC)   • Leukocytosis   • Stage 3b chronic kidney disease ([facility])   • PVC (premature ventricular contraction)   • Obesity (BMI 30-39. 9)   • Chronic gout without tophus   • CHF (congestive heart failure) ([facility])   • Impaired fasting blood sugar   • Venous stasis dermatitis of both lower extremities   • Class 1 obesity due to excess calories with serious comorbidity and body mass index (BMI) of 31.0 to 31.9 in adult   • Venous stasis   • Frailty syndrome in geriatric patient      Past Medical and Surgical History:     Past Medical History:   Diagnosis Date   • VAN (acute kidney injury) ([address])    • Atrial flutter ([facility])    • CHF (congestive heart failure) ([facility])    • CKD (chronic kidney disease) stage 3, GFR 30-59 ml/min ([facility])    • CKD (chronic kidney disease) stage 4, GFR 15-29 ml/min ([address]) 5/3/2021   • Hypertension    • Obstructed, uropathy    • Sepsis ([address])    • Venous stasis    • Venous stasis ulcer ([address]) 6/14/2017     Past Surgical History:   Procedure Laterality Date   • PROSTATE SURGERY     • TONSILLECTOMY     • TRANSURETHRAL RESECTION OF PROSTATE        Family History:     History reviewed. No pertinent family history.    Social History:     Social History     Socioeconomic History   • Marital status: /Civil Union     Spouse name: None   • Number of children: 0   • Years of education: None   • Highest education level: None   Occupational History   • None Tobacco Use   • Smoking status: Never   • Smokeless tobacco: Never   Vaping Use   • Vaping Use: Never used   Substance and Sexual Activity   • Alcohol use: No   • Drug use: No   • Sexual activity: Not Currently   Other Topics Concern   • None   Social History Narrative    Travel outside [place]: No      Social Determinants of Health     Financial Resource Strain: Low Risk  (10/3/2023)    Overall Financial Resource Strain (CARDIA)    • Difficulty of Paying Living Expenses: Not hard at all   Food Insecurity: Not on file   Transportation Needs: No Transportation Needs (10/3/2023)    PRAPARE - Transportation    • Lack of Transportation (Medical): No    • Lack of Transportation (Non-Medical): No   Physical Activity: Not on file   Stress: Not on file   Social Connections: Not on file   Intimate Partner Violence: Not on file   Housing Stability: Not on file      Medications and Allergies:     Current Outpatient Medications   Medication Sig Dispense Refill   • allopurinol (ZYLOPRIM) 100 mg tablet Take 1 tablet (100 mg total) by mouth 2 (two) times a day 60 tablet 2   • amLODIPine (NORVASC) 5 mg tablet Take 1 tablet (5 mg total) by mouth daily 30 tablet 3   • cholecalciferol (VITAMIN D3) 1,000 units tablet Take 1 tablet (1,000 Units total) by mouth daily 100 tablet 3   • ferrous sulfate 325 (65 Fe) mg tablet Take 1 tablet (325 mg total) by mouth daily with breakfast 30 tablet 2   • furosemide (LASIX) 40 mg tablet Take 1 tablet (40 mg total) by mouth daily 30 tablet 3   • levothyroxine (Euthyrox) 112 mcg tablet Take 1 tablet (112 mcg total) by mouth daily in the early morning 90 tablet 1   • metoprolol succinate (TOPROL-XL) 25 mg 24 hr tablet Take 1 tablet (25 mg total) by mouth daily 30 tablet 3   • neomycin-bacitracin-polymyxin (NEOSPORIN) 5-400-5,000 ointment Apply topically 3 (three) times a day for 5 days (Patient not taking: Reported on 1/13/2021) 28.3 g 0     No current facility-administered medications for this visit. No Known Allergies   Immunizations:     Immunization History   Administered Date(s) Administered   • COVID-19 PFIZER VACCINE 0.3 ML IM 01/17/2021, 02/08/2021   • Influenza, high dose seasonal 0.7 mL 11/02/2021, 10/18/2022, 10/03/2023   • Pneumococcal Conjugate 13-Valent 03/09/2015   • Tdap 06/13/2017, 06/06/2020   • Zoster 12/30/2013      Health Maintenance: There are no preventive care reminders to display for this patient. Topic Date Due   • Pneumococcal Vaccine: 65+ Years (2 - PPSV23 if available, else PCV20) 05/04/2015   • COVID-19 Vaccine (3 - Pfizer series) 04/05/2021      Medicare Screening Tests and Risk Assessments:     León Lopez is here for his Subsequent Wellness visit. Last Medicare Wellness visit information reviewed, patient interviewed and updates made to the record today. Health Risk Assessment:   Patient rates overall health as very good. Patient feels that their physical health rating is same. Patient is satisfied with their life. Eyesight was rated as slightly worse. Hearing was rated as same. Patient feels that their emotional and mental health rating is same. Patients states they are sometimes angry. Patient states they are sometimes unusually tired/fatigued. Pain experienced in the last 7 days has been none. Patient states that he has experienced weight loss or gain in last 6 months. Fall Risk Screening: In the past year, patient has experienced: no history of falling in past year      Home Safety:  Patient has trouble with stairs inside or outside of their home. Patient has working smoke alarms and has no working carbon monoxide detector. Home safety hazards include: none. Nutrition:   Current diet is Regular. Medications:   Patient is currently taking over-the-counter supplements. OTC medications include: see medication list. Patient is able to manage medications.      Activities of Daily Living (ADLs)/Instrumental Activities of Daily Living (IADLs):   Walk and transfer into and out of bed and chair?: Yes  Dress and groom yourself?: Yes    Bathe or shower yourself?: Yes    Feed yourself? Yes  Do your laundry/housekeeping?: Yes  Manage your money, pay your bills and track your expenses?: Yes  Make your own meals?: Yes    Do your own shopping?: Yes    Previous Hospitalizations:   Any hospitalizations or ED visits within the last 12 months?: No      Advance Care Planning:   Living will: Yes    Durable POA for healthcare: Yes    Advanced directive: Yes      PREVENTIVE SCREENINGS        Diabetes Screening:     General: Screening Current      Colorectal Cancer Screening:     General: Screening Not Indicated      Prostate Cancer Screening:    General: Screening Not Indicated      Lung Cancer Screening:     General: Screening Not Indicated    Screening, Brief Intervention, and Referral to Treatment (SBIRT)    Screening  Typical number of drinks in a day: 0  Typical number of drinks in a week: 0  Interpretation: Low risk drinking behavior. Single Item Drug Screening:  How often have you used an illegal drug (including marijuana) or a prescription medication for non-medical reasons in the past year? never    Single Item Drug Screen Score: 0  Interpretation: Negative screen for possible drug use disorder    Other Counseling Topics:   Car/seat belt/driving safety, skin self-exam, sunscreen and calcium and vitamin D intake and regular weightbearing exercise. No results found. Physical Exam:     /78   Pulse 73   Temp 97.8 °F (36.6 °C) (Temporal)   Ht 5' 6" (1.676 m)   Wt 95.3 kg (210 lb)   SpO2 98%   BMI 33.89 kg/m²     Physical Exam  Vitals and nursing note reviewed. Constitutional:       General: He is not in acute distress. Appearance: He is well-developed. He is obese. HENT:      Head: Normocephalic and atraumatic. Eyes:      Conjunctiva/sclera: Conjunctivae normal.   Cardiovascular:      Rate and Rhythm: Normal rate and regular rhythm.       Heart sounds: No murmur heard. Pulmonary:      Effort: Pulmonary effort is normal. No respiratory distress. Breath sounds: Normal breath sounds. Abdominal:      Palpations: Abdomen is soft. Tenderness: There is no abdominal tenderness. Musculoskeletal:         General: No swelling. Cervical back: Neck supple. Skin:     General: Skin is warm and dry. Capillary Refill: Capillary refill takes less than 2 seconds. Neurological:      Mental Status: He is alert.       Gait: Gait abnormal.   Psychiatric:         Mood and Affect: Mood normal.          Nikita Bridges MD

## 2023-10-06 LAB
ALBUMIN SERPL-MCNC: 4.1 G/DL (ref 3.6–5.1)
ALBUMIN/GLOB SERPL: 1.5 (CALC) (ref 1–2.5)
ALP SERPL-CCNC: 65 U/L (ref 35–144)
ALT SERPL-CCNC: 8 U/L (ref 9–46)
AST SERPL-CCNC: 13 U/L (ref 10–35)
BASOPHILS # BLD AUTO: 59 CELLS/UL (ref 0–200)
BASOPHILS NFR BLD AUTO: 1 %
BILIRUB SERPL-MCNC: 0.8 MG/DL (ref 0.2–1.2)
BUN SERPL-MCNC: 32 MG/DL (ref 7–25)
BUN/CREAT SERPL: 15 (CALC) (ref 6–22)
CALCIUM SERPL-MCNC: 8.8 MG/DL (ref 8.6–10.3)
CHLORIDE SERPL-SCNC: 101 MMOL/L (ref 98–110)
CO2 SERPL-SCNC: 30 MMOL/L (ref 20–32)
CREAT SERPL-MCNC: 2.14 MG/DL (ref 0.7–1.22)
EOSINOPHIL # BLD AUTO: 413 CELLS/UL (ref 15–500)
EOSINOPHIL NFR BLD AUTO: 7 %
ERYTHROCYTE [DISTWIDTH] IN BLOOD BY AUTOMATED COUNT: 15.7 % (ref 11–15)
FERRITIN SERPL-MCNC: 327 NG/ML (ref 24–380)
GFR/BSA.PRED SERPLBLD CYS-BASED-ARV: 28 ML/MIN/1.73M2
GLOBULIN SER CALC-MCNC: 2.8 G/DL (CALC) (ref 1.9–3.7)
GLUCOSE SERPL-MCNC: 96 MG/DL (ref 65–99)
HCT VFR BLD AUTO: 29.9 % (ref 38.5–50)
HGB BLD-MCNC: 9.9 G/DL (ref 13.2–17.1)
IRON SATN MFR SERPL: 36 % (CALC) (ref 20–48)
IRON SERPL-MCNC: 87 MCG/DL (ref 50–180)
LYMPHOCYTES # BLD AUTO: 1676 CELLS/UL (ref 850–3900)
LYMPHOCYTES NFR BLD AUTO: 28.4 %
MCH RBC QN AUTO: 33.7 PG (ref 27–33)
MCHC RBC AUTO-ENTMCNC: 33.1 G/DL (ref 32–36)
MCV RBC AUTO: 101.7 FL (ref 80–100)
MONOCYTES # BLD AUTO: 649 CELLS/UL (ref 200–950)
MONOCYTES NFR BLD AUTO: 11 %
NEUTROPHILS # BLD AUTO: 3103 CELLS/UL (ref 1500–7800)
NEUTROPHILS NFR BLD AUTO: 52.6 %
PLATELET # BLD AUTO: 177 THOUSAND/UL (ref 140–400)
PMV BLD REES-ECKER: 11.2 FL (ref 7.5–12.5)
POTASSIUM SERPL-SCNC: 4.1 MMOL/L (ref 3.5–5.3)
PROT SERPL-MCNC: 6.9 G/DL (ref 6.1–8.1)
RBC # BLD AUTO: 2.94 MILLION/UL (ref 4.2–5.8)
SODIUM SERPL-SCNC: 139 MMOL/L (ref 135–146)
TIBC SERPL-MCNC: 243 MCG/DL (CALC) (ref 250–425)
WBC # BLD AUTO: 5.9 THOUSAND/UL (ref 3.8–10.8)

## 2023-12-18 DIAGNOSIS — I10 ESSENTIAL HYPERTENSION: ICD-10-CM

## 2023-12-18 DIAGNOSIS — M1A.9XX0 CHRONIC GOUT WITHOUT TOPHUS, UNSPECIFIED CAUSE, UNSPECIFIED SITE: ICD-10-CM

## 2023-12-18 DIAGNOSIS — E03.9 ACQUIRED HYPOTHYROIDISM: ICD-10-CM

## 2023-12-18 DIAGNOSIS — I50.32 CHRONIC DIASTOLIC CONGESTIVE HEART FAILURE (HCC): ICD-10-CM

## 2023-12-18 RX ORDER — LEVOTHYROXINE SODIUM 112 UG/1
112 TABLET ORAL
Qty: 90 TABLET | Refills: 1 | Status: SHIPPED | OUTPATIENT
Start: 2023-12-18

## 2023-12-18 RX ORDER — METOPROLOL SUCCINATE 25 MG/1
25 TABLET, EXTENDED RELEASE ORAL DAILY
Qty: 30 TABLET | Refills: 3 | Status: SHIPPED | OUTPATIENT
Start: 2023-12-18

## 2023-12-18 RX ORDER — AMLODIPINE BESYLATE 5 MG/1
5 TABLET ORAL DAILY
Qty: 30 TABLET | Refills: 3 | Status: SHIPPED | OUTPATIENT
Start: 2023-12-18

## 2023-12-18 RX ORDER — ALLOPURINOL 100 MG/1
100 TABLET ORAL 2 TIMES DAILY
Qty: 60 TABLET | Refills: 2 | Status: SHIPPED | OUTPATIENT
Start: 2023-12-18

## 2023-12-20 DIAGNOSIS — E61.1 IRON DEFICIENCY: ICD-10-CM

## 2023-12-20 DIAGNOSIS — I50.32 CHRONIC DIASTOLIC CONGESTIVE HEART FAILURE (HCC): ICD-10-CM

## 2023-12-20 RX ORDER — FERROUS SULFATE 325(65) MG
325 TABLET ORAL
Qty: 30 TABLET | Refills: 2 | Status: SHIPPED | OUTPATIENT
Start: 2023-12-20

## 2023-12-20 RX ORDER — FUROSEMIDE 40 MG/1
40 TABLET ORAL DAILY
Qty: 30 TABLET | Refills: 3 | Status: SHIPPED | OUTPATIENT
Start: 2023-12-20

## 2024-01-01 ENCOUNTER — APPOINTMENT (EMERGENCY)
Dept: RADIOLOGY | Facility: HOSPITAL | Age: 89
DRG: 871 | End: 2024-01-01
Payer: MEDICARE

## 2024-01-01 ENCOUNTER — HOSPITAL ENCOUNTER (INPATIENT)
Facility: HOSPITAL | Age: 89
LOS: 2 days | DRG: 871 | End: 2024-02-08
Attending: EMERGENCY MEDICINE | Admitting: INTERNAL MEDICINE
Payer: MEDICARE

## 2024-01-01 VITALS
HEART RATE: 114 BPM | TEMPERATURE: 97.5 F | RESPIRATION RATE: 26 BRPM | DIASTOLIC BLOOD PRESSURE: 53 MMHG | SYSTOLIC BLOOD PRESSURE: 89 MMHG | OXYGEN SATURATION: 93 %

## 2024-01-01 DIAGNOSIS — A41.9 SEPTIC SHOCK (HCC): Primary | ICD-10-CM

## 2024-01-01 DIAGNOSIS — L03.818 CELLULITIS OF OTHER SPECIFIED SITE: ICD-10-CM

## 2024-01-01 DIAGNOSIS — R54 FRAILTY SYNDROME IN GERIATRIC PATIENT: ICD-10-CM

## 2024-01-01 DIAGNOSIS — R55 SYNCOPE: ICD-10-CM

## 2024-01-01 DIAGNOSIS — I95.9 HYPOTENSION: ICD-10-CM

## 2024-01-01 DIAGNOSIS — R65.10 SIRS (SYSTEMIC INFLAMMATORY RESPONSE SYNDROME) (HCC): ICD-10-CM

## 2024-01-01 DIAGNOSIS — L03.90 CELLULITIS: ICD-10-CM

## 2024-01-01 DIAGNOSIS — N17.9 AKI (ACUTE KIDNEY INJURY) (HCC): ICD-10-CM

## 2024-01-01 DIAGNOSIS — R65.21 SEPTIC SHOCK (HCC): Primary | ICD-10-CM

## 2024-01-01 LAB
2HR DELTA HS TROPONIN: -24 NG/L
4HR DELTA HS TROPONIN: -10 NG/L
ALBUMIN SERPL BCP-MCNC: 3.3 G/DL (ref 3.5–5)
ALP SERPL-CCNC: 37 U/L (ref 34–104)
ALT SERPL W P-5'-P-CCNC: 92 U/L (ref 7–52)
ANION GAP SERPL CALCULATED.3IONS-SCNC: 20 MMOL/L
ANISOCYTOSIS BLD QL SMEAR: PRESENT
APTT PPP: 28 SECONDS (ref 23–37)
AST SERPL W P-5'-P-CCNC: 293 U/L (ref 13–39)
BASOPHILS # BLD MANUAL: 0 THOUSAND/UL (ref 0–0.1)
BASOPHILS NFR MAR MANUAL: 0 % (ref 0–1)
BILIRUB SERPL-MCNC: 1.79 MG/DL (ref 0.2–1)
BUN SERPL-MCNC: 94 MG/DL (ref 5–25)
CALCIUM ALBUM COR SERPL-MCNC: 9.3 MG/DL (ref 8.3–10.1)
CALCIUM SERPL-MCNC: 8.7 MG/DL (ref 8.4–10.2)
CARDIAC TROPONIN I PNL SERPL HS: 165 NG/L
CARDIAC TROPONIN I PNL SERPL HS: 179 NG/L
CARDIAC TROPONIN I PNL SERPL HS: 189 NG/L
CHLORIDE SERPL-SCNC: 105 MMOL/L (ref 96–108)
CO2 SERPL-SCNC: 16 MMOL/L (ref 21–32)
CREAT SERPL-MCNC: 4.54 MG/DL (ref 0.6–1.3)
DIFFERENTIAL COMMENT: ABNORMAL
EOSINOPHIL # BLD MANUAL: 0 THOUSAND/UL (ref 0–0.4)
EOSINOPHIL NFR BLD MANUAL: 0 % (ref 0–6)
ERYTHROCYTE [DISTWIDTH] IN BLOOD BY AUTOMATED COUNT: 17.4 % (ref 11.6–15.1)
GFR SERPL CREATININE-BSD FRML MDRD: 10 ML/MIN/1.73SQ M
GLUCOSE SERPL-MCNC: 68 MG/DL (ref 65–140)
HCT VFR BLD AUTO: 32.6 % (ref 36.5–49.3)
HGB BLD-MCNC: 10.5 G/DL (ref 12–17)
INR PPP: 1.4 (ref 0.84–1.19)
LACTATE SERPL-SCNC: 3.5 MMOL/L (ref 0.5–2)
LACTATE SERPL-SCNC: 4.7 MMOL/L (ref 0.5–2)
LYMPHOCYTES # BLD AUTO: 0.61 THOUSAND/UL (ref 0.6–4.47)
LYMPHOCYTES # BLD AUTO: 7 % (ref 14–44)
MACROCYTES BLD QL AUTO: PRESENT
MCH RBC QN AUTO: 33.3 PG (ref 26.8–34.3)
MCHC RBC AUTO-ENTMCNC: 32.2 G/DL (ref 31.4–37.4)
MCV RBC AUTO: 104 FL (ref 82–98)
METAMYELOCYTES NFR BLD MANUAL: 6 % (ref 0–1)
MONOCYTES # BLD AUTO: 0.73 THOUSAND/UL (ref 0–1.22)
MONOCYTES NFR BLD: 12 % (ref 4–12)
MYELOCYTES NFR BLD MANUAL: 2 % (ref 0–1)
NEUTROPHILS # BLD MANUAL: 4.25 THOUSAND/UL (ref 1.85–7.62)
NEUTS BAND NFR BLD MANUAL: 16 % (ref 0–8)
NEUTS SEG NFR BLD AUTO: 54 % (ref 43–75)
NRBC BLD AUTO-RTO: 1 /100 WBC (ref 0–2)
OVALOCYTES BLD QL SMEAR: PRESENT
PATHOLOGY REVIEW: YES
PLATELET # BLD AUTO: 139 THOUSANDS/UL (ref 149–390)
PLATELET BLD QL SMEAR: ABNORMAL
PMV BLD AUTO: 11.1 FL (ref 8.9–12.7)
POIKILOCYTOSIS BLD QL SMEAR: PRESENT
POTASSIUM SERPL-SCNC: 5.1 MMOL/L (ref 3.5–5.3)
PROCALCITONIN SERPL-MCNC: 23.76 NG/ML
PROT SERPL-MCNC: 6.1 G/DL (ref 6.4–8.4)
PROTHROMBIN TIME: 17 SECONDS (ref 11.6–14.5)
RBC # BLD AUTO: 3.15 MILLION/UL (ref 3.88–5.62)
RBC MORPH BLD: PRESENT
SODIUM SERPL-SCNC: 141 MMOL/L (ref 135–147)
TARGETS BLD QL SMEAR: PRESENT
VARIANT LYMPHS # BLD AUTO: 3 %
WBC # BLD AUTO: 6.07 THOUSAND/UL (ref 4.31–10.16)

## 2024-01-01 PROCEDURE — 96368 THER/DIAG CONCURRENT INF: CPT

## 2024-01-01 PROCEDURE — 85007 BL SMEAR W/DIFF WBC COUNT: CPT

## 2024-01-01 PROCEDURE — 85730 THROMBOPLASTIN TIME PARTIAL: CPT

## 2024-01-01 PROCEDURE — 84145 PROCALCITONIN (PCT): CPT

## 2024-01-01 PROCEDURE — 70450 CT HEAD/BRAIN W/O DYE: CPT

## 2024-01-01 PROCEDURE — 72125 CT NECK SPINE W/O DYE: CPT

## 2024-01-01 PROCEDURE — 84484 ASSAY OF TROPONIN QUANT: CPT

## 2024-01-01 PROCEDURE — 99233 SBSQ HOSP IP/OBS HIGH 50: CPT | Performed by: INTERNAL MEDICINE

## 2024-01-01 PROCEDURE — 99284 EMERGENCY DEPT VISIT MOD MDM: CPT

## 2024-01-01 PROCEDURE — 83605 ASSAY OF LACTIC ACID: CPT

## 2024-01-01 PROCEDURE — 99223 1ST HOSP IP/OBS HIGH 75: CPT | Performed by: SURGERY

## 2024-01-01 PROCEDURE — 99223 1ST HOSP IP/OBS HIGH 75: CPT | Performed by: INTERNAL MEDICINE

## 2024-01-01 PROCEDURE — 96367 TX/PROPH/DG ADDL SEQ IV INF: CPT

## 2024-01-01 PROCEDURE — NC001 PR NO CHARGE: Performed by: INTERNAL MEDICINE

## 2024-01-01 PROCEDURE — 80053 COMPREHEN METABOLIC PANEL: CPT

## 2024-01-01 PROCEDURE — 74176 CT ABD & PELVIS W/O CONTRAST: CPT

## 2024-01-01 PROCEDURE — 99291 CRITICAL CARE FIRST HOUR: CPT | Performed by: EMERGENCY MEDICINE

## 2024-01-01 PROCEDURE — 87040 BLOOD CULTURE FOR BACTERIA: CPT

## 2024-01-01 PROCEDURE — 99238 HOSP IP/OBS DSCHRG MGMT 30/<: CPT | Performed by: INTERNAL MEDICINE

## 2024-01-01 PROCEDURE — 71250 CT THORAX DX C-: CPT

## 2024-01-01 PROCEDURE — G1004 CDSM NDSC: HCPCS

## 2024-01-01 PROCEDURE — 85060 BLOOD SMEAR INTERPRETATION: CPT | Performed by: PATHOLOGY

## 2024-01-01 PROCEDURE — 93005 ELECTROCARDIOGRAM TRACING: CPT

## 2024-01-01 PROCEDURE — 87154 CUL TYP ID BLD PTHGN 6+ TRGT: CPT

## 2024-01-01 PROCEDURE — 85027 COMPLETE CBC AUTOMATED: CPT

## 2024-01-01 PROCEDURE — 87147 CULTURE TYPE IMMUNOLOGIC: CPT

## 2024-01-01 PROCEDURE — 36415 COLL VENOUS BLD VENIPUNCTURE: CPT

## 2024-01-01 PROCEDURE — 96365 THER/PROPH/DIAG IV INF INIT: CPT

## 2024-01-01 PROCEDURE — 85610 PROTHROMBIN TIME: CPT

## 2024-01-01 RX ORDER — LEVOTHYROXINE SODIUM 112 UG/1
112 TABLET ORAL
Status: DISCONTINUED | OUTPATIENT
Start: 2024-01-01 | End: 2024-01-01

## 2024-01-01 RX ORDER — METRONIDAZOLE 500 MG/100ML
500 INJECTION, SOLUTION INTRAVENOUS EVERY 8 HOURS
Status: DISCONTINUED | OUTPATIENT
Start: 2024-01-01 | End: 2024-01-01

## 2024-01-01 RX ORDER — HYDROMORPHONE HCL/PF 1 MG/ML
1 SYRINGE (ML) INJECTION
Status: DISCONTINUED | OUTPATIENT
Start: 2024-01-01 | End: 2024-02-08 | Stop reason: HOSPADM

## 2024-01-01 RX ORDER — LORAZEPAM 2 MG/ML
1 INJECTION INTRAMUSCULAR
Status: DISCONTINUED | OUTPATIENT
Start: 2024-01-01 | End: 2024-02-08 | Stop reason: HOSPADM

## 2024-01-01 RX ORDER — SODIUM CHLORIDE, SODIUM GLUCONATE, SODIUM ACETATE, POTASSIUM CHLORIDE, MAGNESIUM CHLORIDE, SODIUM PHOSPHATE, DIBASIC, AND POTASSIUM PHOSPHATE .53; .5; .37; .037; .03; .012; .00082 G/100ML; G/100ML; G/100ML; G/100ML; G/100ML; G/100ML; G/100ML
100 INJECTION, SOLUTION INTRAVENOUS CONTINUOUS
Status: DISCONTINUED | OUTPATIENT
Start: 2024-01-01 | End: 2024-01-01

## 2024-01-01 RX ORDER — SODIUM CHLORIDE, SODIUM GLUCONATE, SODIUM ACETATE, POTASSIUM CHLORIDE, MAGNESIUM CHLORIDE, SODIUM PHOSPHATE, DIBASIC, AND POTASSIUM PHOSPHATE .53; .5; .37; .037; .03; .012; .00082 G/100ML; G/100ML; G/100ML; G/100ML; G/100ML; G/100ML; G/100ML
1000 INJECTION, SOLUTION INTRAVENOUS ONCE
Status: COMPLETED | OUTPATIENT
Start: 2024-01-01 | End: 2024-01-01

## 2024-01-01 RX ORDER — NYSTATIN 100000 [USP'U]/G
POWDER TOPICAL 2 TIMES DAILY
Status: DISCONTINUED | OUTPATIENT
Start: 2024-01-01 | End: 2024-02-08 | Stop reason: HOSPADM

## 2024-01-01 RX ORDER — VANCOMYCIN HYDROCHLORIDE 1 G/200ML
12.5 INJECTION, SOLUTION INTRAVENOUS AS NEEDED
Status: DISCONTINUED | OUTPATIENT
Start: 2024-01-01 | End: 2024-01-01

## 2024-01-01 RX ORDER — SODIUM CHLORIDE, SODIUM GLUCONATE, SODIUM ACETATE, POTASSIUM CHLORIDE, MAGNESIUM CHLORIDE, SODIUM PHOSPHATE, DIBASIC, AND POTASSIUM PHOSPHATE .53; .5; .37; .037; .03; .012; .00082 G/100ML; G/100ML; G/100ML; G/100ML; G/100ML; G/100ML; G/100ML
500 INJECTION, SOLUTION INTRAVENOUS ONCE
Status: DISCONTINUED | OUTPATIENT
Start: 2024-01-01 | End: 2024-01-01

## 2024-01-01 RX ORDER — HEPARIN SODIUM 5000 [USP'U]/ML
5000 INJECTION, SOLUTION INTRAVENOUS; SUBCUTANEOUS EVERY 8 HOURS SCHEDULED
Status: DISCONTINUED | OUTPATIENT
Start: 2024-01-01 | End: 2024-01-01

## 2024-01-01 RX ORDER — LORAZEPAM 2 MG/ML
0.5 INJECTION INTRAMUSCULAR
Status: DISCONTINUED | OUTPATIENT
Start: 2024-01-01 | End: 2024-02-08 | Stop reason: HOSPADM

## 2024-01-01 RX ORDER — IODINE/SODIUM IODIDE 2 %
TINCTURE TOPICAL 4 TIMES DAILY
Status: DISCONTINUED | OUTPATIENT
Start: 2024-01-01 | End: 2024-02-08 | Stop reason: HOSPADM

## 2024-01-01 RX ORDER — HYDROMORPHONE HCL/PF 1 MG/ML
0.5 SYRINGE (ML) INJECTION
Status: DISCONTINUED | OUTPATIENT
Start: 2024-01-01 | End: 2024-02-08 | Stop reason: HOSPADM

## 2024-01-01 RX ADMIN — NYSTATIN: 100000 POWDER TOPICAL at 13:21

## 2024-01-01 RX ADMIN — FERRIC OXIDE RED: 8; 8 LOTION TOPICAL at 21:25

## 2024-01-01 RX ADMIN — HYDROMORPHONE HYDROCHLORIDE 1 MG: 1 INJECTION, SOLUTION INTRAMUSCULAR; INTRAVENOUS; SUBCUTANEOUS at 12:55

## 2024-01-01 RX ADMIN — SODIUM CHLORIDE 1000 ML: 0.9 INJECTION, SOLUTION INTRAVENOUS at 17:17

## 2024-01-01 RX ADMIN — HYDROMORPHONE HYDROCHLORIDE 0.5 MG: 1 INJECTION, SOLUTION INTRAMUSCULAR; INTRAVENOUS; SUBCUTANEOUS at 10:00

## 2024-01-01 RX ADMIN — SODIUM CHLORIDE, SODIUM GLUCONATE, SODIUM ACETATE, POTASSIUM CHLORIDE, MAGNESIUM CHLORIDE, SODIUM PHOSPHATE, DIBASIC, AND POTASSIUM PHOSPHATE 1000 ML: .53; .5; .37; .037; .03; .012; .00082 INJECTION, SOLUTION INTRAVENOUS at 12:46

## 2024-01-01 RX ADMIN — HYDROMORPHONE HYDROCHLORIDE 0.5 MG: 1 INJECTION, SOLUTION INTRAMUSCULAR; INTRAVENOUS; SUBCUTANEOUS at 17:57

## 2024-01-01 RX ADMIN — LORAZEPAM 0.5 MG: 2 INJECTION INTRAMUSCULAR; INTRAVENOUS at 12:56

## 2024-01-01 RX ADMIN — SODIUM CHLORIDE, SODIUM GLUCONATE, SODIUM ACETATE, POTASSIUM CHLORIDE, MAGNESIUM CHLORIDE, SODIUM PHOSPHATE, DIBASIC, AND POTASSIUM PHOSPHATE 100 ML/HR: .53; .5; .37; .037; .03; .012; .00082 INJECTION, SOLUTION INTRAVENOUS at 17:17

## 2024-01-01 RX ADMIN — LORAZEPAM 0.5 MG: 2 INJECTION INTRAMUSCULAR; INTRAVENOUS at 17:57

## 2024-01-01 RX ADMIN — LORAZEPAM 0.5 MG: 2 INJECTION INTRAMUSCULAR; INTRAVENOUS at 10:00

## 2024-01-01 RX ADMIN — SODIUM CHLORIDE, SODIUM GLUCONATE, SODIUM ACETATE, POTASSIUM CHLORIDE, MAGNESIUM CHLORIDE, SODIUM PHOSPHATE, DIBASIC, AND POTASSIUM PHOSPHATE 100 ML/HR: .53; .5; .37; .037; .03; .012; .00082 INJECTION, SOLUTION INTRAVENOUS at 16:19

## 2024-01-01 RX ADMIN — HEPARIN SODIUM 5000 UNITS: 5000 INJECTION INTRAVENOUS; SUBCUTANEOUS at 16:18

## 2024-01-01 RX ADMIN — Medication 300 MG: at 13:50

## 2024-01-01 RX ADMIN — SODIUM CHLORIDE, SODIUM GLUCONATE, SODIUM ACETATE, POTASSIUM CHLORIDE, MAGNESIUM CHLORIDE, SODIUM PHOSPHATE, DIBASIC, AND POTASSIUM PHOSPHATE 100 ML/HR: .53; .5; .37; .037; .03; .012; .00082 INJECTION, SOLUTION INTRAVENOUS at 16:18

## 2024-01-01 RX ADMIN — FERRIC OXIDE RED: 8; 8 LOTION TOPICAL at 13:21

## 2024-01-01 RX ADMIN — VANCOMYCIN HYDROCHLORIDE 1250 MG: 10 INJECTION, POWDER, LYOPHILIZED, FOR SOLUTION INTRAVENOUS at 17:36

## 2024-01-01 RX ADMIN — NYSTATIN: 100000 POWDER TOPICAL at 16:55

## 2024-01-01 RX ADMIN — METRONIDAZOLE 500 MG: 500 INJECTION, SOLUTION INTRAVENOUS at 12:46

## 2024-01-01 RX ADMIN — FERRIC OXIDE RED: 8; 8 LOTION TOPICAL at 16:54

## 2024-01-01 RX ADMIN — SODIUM CHLORIDE 1000 ML: 0.9 INJECTION, SOLUTION INTRAVENOUS at 12:34

## 2024-01-01 RX ADMIN — CEFEPIME 2000 MG: 2 INJECTION, POWDER, FOR SOLUTION INTRAVENOUS at 12:03

## 2024-01-23 ENCOUNTER — RA CDI HCC (OUTPATIENT)
Dept: OTHER | Facility: HOSPITAL | Age: 89
End: 2024-01-23

## 2024-01-23 NOTE — PROGRESS NOTES
HCC coding opportunities          Chart Reviewed number of suggestions sent to Provider: 1   I13.0    Patients Insurance     Medicare Insurance: Medicare

## 2024-01-30 ENCOUNTER — OFFICE VISIT (OUTPATIENT)
Dept: INTERNAL MEDICINE CLINIC | Facility: CLINIC | Age: 89
End: 2024-01-30
Payer: MEDICARE

## 2024-01-30 VITALS
HEIGHT: 66 IN | SYSTOLIC BLOOD PRESSURE: 132 MMHG | DIASTOLIC BLOOD PRESSURE: 78 MMHG | HEART RATE: 72 BPM | WEIGHT: 213 LBS | BODY MASS INDEX: 34.23 KG/M2 | TEMPERATURE: 98.6 F | OXYGEN SATURATION: 99 %

## 2024-01-30 DIAGNOSIS — E03.9 ACQUIRED HYPOTHYROIDISM: ICD-10-CM

## 2024-01-30 DIAGNOSIS — I10 ESSENTIAL HYPERTENSION: ICD-10-CM

## 2024-01-30 DIAGNOSIS — M1A.9XX0 CHRONIC GOUT WITHOUT TOPHUS, UNSPECIFIED CAUSE, UNSPECIFIED SITE: ICD-10-CM

## 2024-01-30 DIAGNOSIS — L30.9 ECZEMA, UNSPECIFIED TYPE: ICD-10-CM

## 2024-01-30 DIAGNOSIS — N18.4 CKD (CHRONIC KIDNEY DISEASE) STAGE 4, GFR 15-29 ML/MIN (HCC): ICD-10-CM

## 2024-01-30 DIAGNOSIS — D64.9 ANEMIA, UNSPECIFIED TYPE: Primary | ICD-10-CM

## 2024-01-30 DIAGNOSIS — I50.32 CHRONIC DIASTOLIC CONGESTIVE HEART FAILURE (HCC): ICD-10-CM

## 2024-01-30 PROCEDURE — 99214 OFFICE O/P EST MOD 30 MIN: CPT | Performed by: INTERNAL MEDICINE

## 2024-01-30 RX ORDER — FUROSEMIDE 40 MG/1
40 TABLET ORAL DAILY
Qty: 30 TABLET | Refills: 3 | Status: SHIPPED | OUTPATIENT
Start: 2024-01-30 | End: 2024-02-08

## 2024-01-30 RX ORDER — METOPROLOL SUCCINATE 25 MG/1
25 TABLET, EXTENDED RELEASE ORAL DAILY
Qty: 30 TABLET | Refills: 3 | Status: SHIPPED | OUTPATIENT
Start: 2024-01-30 | End: 2024-02-08

## 2024-01-30 RX ORDER — LEVOTHYROXINE SODIUM 112 UG/1
112 TABLET ORAL
Qty: 90 TABLET | Refills: 1 | Status: SHIPPED | OUTPATIENT
Start: 2024-01-30 | End: 2024-02-08

## 2024-01-30 NOTE — PROGRESS NOTES
Assessment/Plan:           1. Anemia, unspecified type  Comments:  Continue iron.  Hematology consultation discussed but patient is not interested.  Orders:  -     Ambulatory Referral to Hematology / Oncology; Future    2. Acquired hypothyroidism  Comments:  Continue levothyroxine 112 mcg's daily.  Orders:  -     levothyroxine (Euthyrox) 112 mcg tablet; Take 1 tablet (112 mcg total) by mouth daily in the early morning    3. Eczema, unspecified type  Comments:  Moisturizing cream advised on the back.    4. Chronic diastolic congestive heart failure (HCC)  Comments:  Stable.  Continue diuretic and continue to monitor daily weights.  Orders:  -     furosemide (LASIX) 40 mg tablet; Take 1 tablet (40 mg total) by mouth daily  -     metoprolol succinate (TOPROL-XL) 25 mg 24 hr tablet; Take 1 tablet (25 mg total) by mouth daily    5. Acquired hypothyroidism  Comments:  Levothyroxine was increased to 112 mcgs.  Will recheck labs in 3 months.  Orders:  -     levothyroxine (Euthyrox) 112 mcg tablet; Take 1 tablet (112 mcg total) by mouth daily in the early morning    6. Chronic gout without tophus, unspecified cause, unspecified site  Comments:  No recent attacks continue allopurinol.    7. Essential hypertension  Comments:  Continue amlodipine metoprolol and furosemide.    8. CKD (chronic kidney disease) stage 4, GFR 15-29 ml/min ([facility])  Comments:  Nephrology follow-up discussed.           1. Anemia, unspecified type      2. Acquired hypothyroidism      3. Eczema, unspecified type         No problem-specific Assessment & Plan notes found for this encounter.           Subjective:      Patient ID: Haris Lau is a 89 y.o. male.    HPI    The following portions of the patient's history were reviewed and updated as appropriate: He  has a past medical history of VAN (acute kidney injury) ([facility]), Atrial flutter ([facility]), CHF (congestive heart failure) ([facility]), CKD (chronic kidney disease) stage 3, GFR 30-59 ml/min ([facility]), CKD (chronic  kidney disease) stage 4, GFR 15-29 ml/min ([facility]) (5/3/2021), Hypertension, Obstructed, uropathy, Sepsis ([facility]), Venous stasis, and Venous stasis ulcer ([facility]) (6/14/2017).  He   Patient Active Problem List    Diagnosis Date Noted    CKD (chronic kidney disease) stage 4, GFR 15-29 ml/min ([facility]) 01/30/2024    Venous stasis 04/19/2022    Frailty syndrome in geriatric patient 04/19/2022    Obesity (BMI 30-39.9) 03/15/2021    Chronic gout without tophus 03/15/2021    Impaired fasting blood sugar 03/15/2021    Venous stasis dermatitis of both lower extremities 03/15/2021    Class 1 obesity due to excess calories with serious comorbidity and body mass index (BMI) of 31.0 to 31.9 in adult 03/15/2021    CHF (congestive heart failure) ([facility])     PVC (premature ventricular contraction) 08/05/2019    Chronic diastolic congestive heart failure (HCC) 06/14/2017    Leukocytosis 06/14/2017    Stage 3b chronic kidney disease ([facility]) 06/14/2017    Lower extremity edema 06/13/2017    Essential hypertension 06/13/2017    Acquired hypothyroidism 06/13/2017     He  has a past surgical history that includes Prostate surgery; Transurethral resection of prostate; and Tonsillectomy.  His family history is not on file.  He  reports that he has never smoked. He has never used smokeless tobacco. He reports that he does not drink alcohol and does not use drugs.  Current Outpatient Medications   Medication Sig Dispense Refill    allopurinol (ZYLOPRIM) 100 mg tablet Take 1 tablet (100 mg total) by mouth 2 (two) times a day 60 tablet 2    amLODIPine (NORVASC) 5 mg tablet Take 1 tablet (5 mg total) by mouth daily 30 tablet 3    cholecalciferol (VITAMIN D3) 1,000 units tablet Take 1 tablet (1,000 Units total) by mouth daily 100 tablet 3    ferrous sulfate 325 (65 Fe) mg tablet Take 1 tablet (325 mg total) by mouth daily with breakfast 30 tablet 2    furosemide (LASIX) 40 mg tablet Take 1 tablet (40 mg total) by mouth daily 30 tablet 3    levothyroxine  (Euthyrox) 112 mcg tablet Take 1 tablet (112 mcg total) by mouth daily in the early morning 90 tablet 1    metoprolol succinate (TOPROL-XL) 25 mg 24 hr tablet Take 1 tablet (25 mg total) by mouth daily 30 tablet 3    neomycin-bacitracin-polymyxin (NEOSPORIN) 5-400-5,000 ointment Apply topically 3 (three) times a day for 5 days (Patient not taking: Reported on 1/13/2021) 28.3 g 0     No current facility-administered medications for this visit.     Current Outpatient Medications on File Prior to Visit   Medication Sig    allopurinol (ZYLOPRIM) 100 mg tablet Take 1 tablet (100 mg total) by mouth 2 (two) times a day    amLODIPine (NORVASC) 5 mg tablet Take 1 tablet (5 mg total) by mouth daily    cholecalciferol (VITAMIN D3) 1,000 units tablet Take 1 tablet (1,000 Units total) by mouth daily    ferrous sulfate 325 (65 Fe) mg tablet Take 1 tablet (325 mg total) by mouth daily with breakfast    [DISCONTINUED] furosemide (LASIX) 40 mg tablet Take 1 tablet (40 mg total) by mouth daily    [DISCONTINUED] levothyroxine (Euthyrox) 112 mcg tablet Take 1 tablet (112 mcg total) by mouth daily in the early morning    [DISCONTINUED] metoprolol succinate (TOPROL-XL) 25 mg 24 hr tablet Take 1 tablet (25 mg total) by mouth daily    neomycin-bacitracin-polymyxin (NEOSPORIN) 5-400-5,000 ointment Apply topically 3 (three) times a day for 5 days (Patient not taking: Reported on 1/13/2021)     No current facility-administered medications on file prior to visit.     Medications Discontinued During This Encounter   Medication Reason    levothyroxine (Euthyrox) 112 mcg tablet Reorder    metoprolol succinate (TOPROL-XL) 25 mg 24 hr tablet Reorder    furosemide (LASIX) 40 mg tablet Reorder      He has No Known Allergies..    Review of Systems   Constitutional:  Negative for appetite change, chills, fatigue and fever.   HENT:  Negative for sore throat and trouble swallowing.    Eyes:  Negative for redness.   Respiratory:  Negative for shortness of  "breath.    Cardiovascular:  Negative for chest pain and palpitations.   Gastrointestinal:  Negative for abdominal pain, constipation and diarrhea.   Genitourinary:  Negative for dysuria and hematuria.   Musculoskeletal:  Positive for arthralgias, back pain and gait problem. Negative for neck pain.   Skin:  Negative for rash.        Dry skin on back   Neurological:  Negative for seizures, weakness and headaches.   Hematological:  Negative for adenopathy.   Psychiatric/Behavioral:  Negative for confusion. The patient is not nervous/anxious.          Objective:      /78 (BP Location: Left arm, Patient Position: Sitting, Cuff Size: Standard)   Pulse 72   Temp 98.6 °F (37 °C) (Temporal)   Ht 5' 6\" (1.676 m)   Wt 96.6 kg (213 lb)   SpO2 99%   BMI 34.38 kg/m²     Results Reviewed       None            No results found for this or any previous visit (from the past 1344 hour(s)).     Physical Exam  Constitutional:       General: He is not in acute distress.     Appearance: Normal appearance.   HENT:      Head: Normocephalic and atraumatic.      Nose: Nose normal.      Mouth/Throat:      Mouth: Mucous membranes are moist.   Eyes:      Extraocular Movements: Extraocular movements intact.      Pupils: Pupils are equal, round, and reactive to light.   Cardiovascular:      Rate and Rhythm: Normal rate and regular rhythm.      Pulses: Normal pulses.      Heart sounds: Normal heart sounds. No murmur heard.     No friction rub.   Pulmonary:      Effort: Pulmonary effort is normal. No respiratory distress.      Breath sounds: Normal breath sounds. No wheezing.   Abdominal:      General: Abdomen is flat. Bowel sounds are normal. There is no distension.      Palpations: Abdomen is soft. There is no mass.      Tenderness: There is no abdominal tenderness. There is no guarding.   Musculoskeletal:         General: Normal range of motion.      Cervical back: Normal range of motion.   Skin:     Comments: Dry skin on back. "   Neurological:      General: No focal deficit present.      Mental Status: He is alert and oriented to person, place, and time. Mental status is at baseline.      Cranial Nerves: No cranial nerve deficit.      Gait: Gait abnormal.   Psychiatric:         Mood and Affect: Mood normal.         Behavior: Behavior normal.

## 2024-01-31 ENCOUNTER — TELEPHONE (OUTPATIENT)
Dept: HEMATOLOGY ONCOLOGY | Facility: CLINIC | Age: 89
End: 2024-01-31

## 2024-01-31 NOTE — TELEPHONE ENCOUNTER
I called Haris in response to a referral that was received for patient to establish care with Hematology.     Outreach was made to schedule a consultation.    Patient was confused on why I was calling and asked if I can transfer to his doctor.   Another attempt will be made to contact patient.

## 2024-02-01 ENCOUNTER — TELEPHONE (OUTPATIENT)
Dept: HEMATOLOGY ONCOLOGY | Facility: CLINIC | Age: 89
End: 2024-02-01

## 2024-02-01 NOTE — TELEPHONE ENCOUNTER
I called Haris in response to a referral that was received for patient to establish care with Hematology.     Outreach was made to schedule a consultation.    I left a voicemail explaining the reason for my call and advised patient to call [facility] at [phone].  Another attempt will be made to contact patient.

## 2024-02-02 ENCOUNTER — TELEPHONE (OUTPATIENT)
Dept: HEMATOLOGY ONCOLOGY | Facility: CLINIC | Age: 89
End: 2024-02-02

## 2024-02-02 NOTE — TELEPHONE ENCOUNTER
I called Haris in response to a referral that was received for patient to establish care with Hematology.     Outreach was made to schedule a consultation.    I left a voicemail explaining the reason for my call and advised patient to call [facility] at [phone].  This is the third attempt to schedule patient unsuccessfully. The referral has been closed, a [facility] message has been sent to patient (if applicable) and a letter has been sent to the address on file.

## 2024-02-06 PROBLEM — R65.21 SEPTIC SHOCK (HCC): Status: ACTIVE | Noted: 2017-06-13

## 2024-02-06 PROBLEM — R79.89 ELEVATED TROPONIN: Status: ACTIVE | Noted: 2024-01-01

## 2024-02-06 NOTE — ED PROVIDER NOTES
"History  Chief Complaint   Patient presents with    Syncope     Syncopal episode while having BM.     89-year-old male presenting to the emergency department for possible syncopal episode.  Patient's wife reportedly called 911 when the patient syncopized on the bathroom.  On arrival to the hospital patient is only alert to self.  Patient states \"I feel fine\" patient offers no complaints.        Prior to Admission Medications   Prescriptions Last Dose Informant Patient Reported? Taking?   allopurinol (ZYLOPRIM) 100 mg tablet Past Week Self No Yes   Sig: Take 1 tablet (100 mg total) by mouth 2 (two) times a day   amLODIPine (NORVASC) 5 mg tablet Past Week Self No Yes   Sig: Take 1 tablet (5 mg total) by mouth daily   cholecalciferol (VITAMIN D3) 1,000 units tablet Past Week Self No Yes   Sig: Take 1 tablet (1,000 Units total) by mouth daily   ferrous sulfate 325 (65 Fe) mg tablet Past Week Self No Yes   Sig: Take 1 tablet (325 mg total) by mouth daily with breakfast   furosemide (LASIX) 40 mg tablet Past Week  No Yes   Sig: Take 1 tablet (40 mg total) by mouth daily   levothyroxine (Euthyrox) 112 mcg tablet Past Week  No Yes   Sig: Take 1 tablet (112 mcg total) by mouth daily in the early morning   metoprolol succinate (TOPROL-XL) 25 mg 24 hr tablet Past Week  No Yes   Sig: Take 1 tablet (25 mg total) by mouth daily   neomycin-bacitracin-polymyxin (NEOSPORIN) 5-400-5,000 ointment   No No   Sig: Apply topically 3 (three) times a day for 5 days   Patient not taking: Reported on 1/13/2021      Facility-Administered Medications: None       Past Medical History:   Diagnosis Date    VAN (acute kidney injury) ([facility])     Atrial flutter (HCC)     CHF (congestive heart failure) ([facility])     CKD (chronic kidney disease) stage 3, GFR 30-59 ml/min ([facility])     CKD (chronic kidney disease) stage 4, GFR 15-29 ml/min ([facility]) 5/3/2021    Hypertension     Obstructed, uropathy     Sepsis (HCC)     Venous stasis     Venous stasis ulcer ([facility]) " 6/14/2017       Past Surgical History:   Procedure Laterality Date    PROSTATE SURGERY      TONSILLECTOMY      TRANSURETHRAL RESECTION OF PROSTATE         History reviewed. No pertinent family history.  I have reviewed and agree with the history as documented.    E-Cigarette/Vaping    E-Cigarette Use Never User      E-Cigarette/Vaping Substances    Nicotine No     THC No     CBD No     Flavoring No     Other No     Unknown No      Social History     Tobacco Use    Smoking status: Never    Smokeless tobacco: Never   Vaping Use    Vaping status: Never Used   Substance Use Topics    Alcohol use: No    Drug use: No        Review of Systems   Unable to perform ROS: Dementia       Physical Exam  ED Triage Vitals   Temperature Pulse Respirations Blood Pressure SpO2   02/06/24 1105 02/06/24 1105 02/06/24 1105 02/06/24 1105 02/06/24 1105   97.5 °F (36.4 °C) 102 18 98/55 95 %      Temp src Heart Rate Source Patient Position - Orthostatic VS BP Location FiO2 (%)   -- 02/06/24 2035 02/06/24 2035 02/06/24 2035 --    Monitor Lying Left arm       Pain Score       02/06/24 2316       No Pain             Orthostatic Vital Signs  Vitals:    02/06/24 2035 02/06/24 2106 02/06/24 2315 02/07/24 0756   BP: 110/62 (!) 89/53     Pulse: 100 102 100 (!) 114   Patient Position - Orthostatic VS: Lying          Physical Exam  Vitals and nursing note reviewed.   Constitutional:       General: He is not in acute distress.     Appearance: He is well-developed. He is ill-appearing.   HENT:      Head: Normocephalic and atraumatic.      Right Ear: External ear normal.      Left Ear: External ear normal.      Nose: Nose normal.      Mouth/Throat:      Mouth: Mucous membranes are dry.   Eyes:      Extraocular Movements: Extraocular movements intact.      Conjunctiva/sclera: Conjunctivae normal.   Cardiovascular:      Rate and Rhythm: Tachycardia present. Rhythm irregular.      Heart sounds: Murmur heard.   Pulmonary:      Effort: Pulmonary effort is  normal. No respiratory distress.      Breath sounds: Normal breath sounds.   Abdominal:      General: Abdomen is flat.      Palpations: Abdomen is soft.      Tenderness: There is no abdominal tenderness.   Genitourinary:     Pubic Area: Rash present.      Penis: Discharge present.       Comments: See pictures below  Musculoskeletal:         General: No swelling.      Cervical back: Normal range of motion and neck supple.      Right lower leg: Edema present.      Left lower leg: Edema present.   Skin:     General: Skin is warm and dry.      Capillary Refill: Capillary refill takes more than 3 seconds.      Findings: Bruising present.      Comments: Bilateral lower extremity bruising significant cellulitic process noted in patient's groin as pictured below there is no crepitus however there is hemorrhagic bulla present on patient's testicles   Neurological:      Mental Status: He is alert. Mental status is at baseline.      Motor: No weakness.   Psychiatric:         Mood and Affect: Mood normal.        Media Information      Document Information    Clinical Image - Mobile Device      02/06/2024 13:17   Attached To:   Hospital Encounter on 2/6/24   Source Information    Annette Maria Palladino, DO  [facility]        Media Information      Document Information    Clinical Image - Mobile Device      02/06/2024 13:18   Attached To:   Hospital Encounter on 2/6/24   Source Information    Annette Maria Palladino, DO  [facility]     ED Medications  Medications   HYDROmorphone (DILAUDID) injection 0.5 mg (0.5 mg Intravenous Given 2/7/24 1000)   LORazepam (ATIVAN) injection 0.5 mg (0.5 mg Intravenous Given 2/7/24 1000)   nystatin (MYCOSTATIN) powder (has no administration in time range)   calamine-zinc oxide lotion (has no administration in time range)   HYDROmorphone (DILAUDID) injection 1 mg (has no administration in time range)   LORazepam (ATIVAN) injection 1 mg (has no administration in time range)   cefepime (MAXIPIME) 2 g/50 mL  dextrose IVPB (0 mg Intravenous Stopped 2/6/24 1234)   multi-electrolyte (ISOLYTE-S PH 7.4) bolus 1,000 mL (0 mL Intravenous Stopped 2/6/24 1329)   sodium chloride 0.9 % bolus 1,000 mL (0 mL Intravenous Stopped 2/6/24 1234)   linezolid (ZYVOX) IVPB 300 mg (0 mg Intravenous Stopped 2/6/24 1450)   vancomycin (VANCOCIN) 1,250 mg in sodium chloride 0.9 % 250 mL IVPB (1,250 mg Intravenous New Bag 2/6/24 1736)   sodium chloride 0.9 % bolus 1,000 mL (1,000 mL Intravenous New Bag 2/6/24 1717)       Diagnostic Studies  Results Reviewed       Procedure Component Value Units Date/Time    Blood Culture Identification Panel [[id number]]  (Abnormal) Collected: 02/06/24 1150    Lab Status: Preliminary result Specimen: Blood from Arm, Right Updated: 02/07/24 0312     Streptococcus Detected    Narrative:      Routine culture and susceptiblity to follow for confirmation.    Film Array panel tests for 11 gram positive organisms, 15 gram negative organisms, 7 yeast species and 10 resistance genes.     Blood culture #2 [[id number]]  (Abnormal) Collected: 02/06/24 1150    Lab Status: Preliminary result Specimen: Blood from Arm, Left Updated: 02/07/24 0231     Gram Stain Result Gram positive cocci in chains    Blood culture #1 [[id number]]  (Abnormal) Collected: 02/06/24 1150    Lab Status: Preliminary result Specimen: Blood from Arm, Right Updated: 02/07/24 0129     Gram Stain Result Gram positive cocci in chains    HS Troponin I 4hr [[id number]]  (Abnormal) Collected: 02/06/24 1608    Lab Status: Final result Specimen: Blood from Arm, Left Updated: 02/06/24 1649     hs TnI 4hr 179 ng/L      Delta 4hr hsTnI -10 ng/L     Lactic acid 2 Hours [[id number]]  (Abnormal) Resulted: 02/06/24 1450    Lab Status: Final result Specimen: Blood Updated: 02/06/24 1450     LACTIC ACID 3.5 mmol/L     Narrative:      Result may be elevated if tourniquet was used during collection.    HS Troponin I 2hr [[id number]]  (Abnormal) Resulted: 02/06/24 1447    Lab  Status: Final result Specimen: Blood Updated: 02/06/24 1447     hs TnI 2hr 165 ng/L      Delta 2hr hsTnI -24 ng/L     CBC and differential [[id number]]  (Abnormal) Collected: 02/06/24 1134    Lab Status: Final result Specimen: Blood from Arm, Left Updated: 02/06/24 1350     WBC 6.07 Thousand/uL      RBC 3.15 Million/uL      Hemoglobin 10.5 g/dL      Hematocrit 32.6 %       fL      MCH 33.3 pg      MCHC 32.2 g/dL      RDW 17.4 %      MPV 11.1 fL      Platelets 139 Thousands/uL     Narrative:      This is an appended report.  These results have been appended to a previously verified report.    Manual Differential(PHLEBS Do Not Order) [[id number]]  (Abnormal) Collected: 02/06/24 1134    Lab Status: Final result Specimen: Blood from Arm, Left Updated: 02/06/24 1350     Segmented % 54 %      Bands % 16 %      Lymphocytes % 7 %      Monocytes % 12 %      Eosinophils, % 0 %      Basophils % 0 %      Metamyelocytes% 6 %      Myelocytes % 2 %      Atypical Lymphocytes % 3 %      Absolute Neutrophils 4.25 Thousand/uL      Lymphocytes Absolute 0.61 Thousand/uL      Monocytes Absolute 0.73 Thousand/uL      Eosinophils Absolute 0.00 Thousand/uL      Basophils Absolute 0.00 Thousand/uL      Total Counted --     nRBC 1 /100 WBC      RBC Morphology Present     Platelet Estimate Borderline     Pathology Review Yes     Differential Comment see note     Anisocytosis Present     Macrocytes Present     Ovalocytes Present     Poikilocytes Present     Target Cells Present    Path Slide Review [[id number]] Collected: 02/06/24 1138    Lab Status: Final result Specimen: Blood from Arm, Left Updated: 02/06/24 1321     Case Report --     Clinical Pathology Report                         Case: [id number]                                  Authorizing Provider:  Annette Maria Palladino,   Collected:           02/06/2024 1134                                     DO                                                                            Ordering Location:     [facility]      Received:            02/06/2024 [address] [place] [address] Emergency                                                                                  Department                                                                   Pathologist:           Robert Pandya MD                                                          Specimen:    Arm, Left                                                                                   Path Slide --     Peripheral blood smear review shows macrocytic anemia with mild nonspecific anisopoikilocytosis including elliptocytes, spherocytes, microcytes, target cells, and micro poikilocytes with rare scattered circulating nucleated red blood cells.  White blood cells show slightly left shifted granulocyte lineage with no overt features of dysplasia or circulating myeloblasts.  Platelets show occasional large forms and clusters.  Overall, combination of findings is not definitively specific.  The differential diagnosis may include numerous reactive conditions such as drug effect, toxin exposure, nutritional deficiency, autoimmune disease, or other systemic inflammation.  Alternatively, the possibility of a primary bone marrow or myeloid disorder cannot be entirely excluded (such as myelodysplastic syndrome).  Correlation with clinical impression and other laboratory findings is recommended.  If clinical suspicion for a more significant disease process exists without identifiable etiology for these changes then consideration for repeat sampling with peripheral blood flow cytometry and hematology/oncology consultation may be of assistance as clinically indicated.      Procalcitonin [[id number]]  (Abnormal) Collected: 02/06/24 1150    Lab Status: Final result Specimen: Blood from Arm, Left Updated: 02/06/24 1231     Procalcitonin 23.76 ng/ml     Lactic acid [[id number]]  (Abnormal) Collected: 02/06/24 1150    Lab  Status: Final result Specimen: Blood from Arm, Left Updated: 02/06/24 1228     LACTIC ACID 4.7 mmol/L     Narrative:      Result may be elevated if tourniquet was used during collection.    Protime-INR [[id number]]  (Abnormal) Collected: 02/06/24 1150    Lab Status: Final result Specimen: Blood from Arm, Left Updated: 02/06/24 1215     Protime 17.0 seconds      INR 1.40    APTT [[id number]]  (Normal) Collected: 02/06/24 1150    Lab Status: Final result Specimen: Blood from Arm, Left Updated: 02/06/24 1215     PTT 28 seconds     HS Troponin 0hr (reflex protocol) [[id number]]  (Abnormal) Collected: 02/06/24 1134    Lab Status: Final result Specimen: Blood from Arm, Left Updated: 02/06/24 1210     hs TnI 0hr 189 ng/L     Comprehensive metabolic panel [[id number]]  (Abnormal) Collected: 02/06/24 1134    Lab Status: Final result Specimen: Blood from Arm, Left Updated: 02/06/24 1209     Sodium 141 mmol/L      Potassium 5.1 mmol/L      Chloride 105 mmol/L      CO2 16 mmol/L      ANION GAP 20 mmol/L      BUN 94 mg/dL      Creatinine 4.54 mg/dL      Glucose 68 mg/dL      Calcium 8.7 mg/dL      Corrected Calcium 9.3 mg/dL       U/L      ALT 92 U/L      Alkaline Phosphatase 37 U/L      Total Protein 6.1 g/dL      Albumin 3.3 g/dL      Total Bilirubin 1.79 mg/dL      eGFR 10 ml/min/1.73sq m     Narrative:      [employer] Kid[employer] guidelines for Chronic Kidney Disease (CKD):     Stage 1 with normal or high GFR (GFR > 90 mL/min/1.73 square meters)    Stage 2 Mild CKD (GFR = 60-89 mL/min/1.73 square meters)    Stage 3A Moderate CKD (GFR = 45-59 mL/min/1.73 square meters)    Stage 3B Moderate CKD (GFR = 30-44 mL/min/1.73 square meters)    Stage 4 Severe CKD (GFR = 15-29 mL/min/1.73 square meters)    Stage 5 End Stage CKD (GFR <15 mL/min/1.73 square meters)  Note: GFR calculation is accurate only with a steady state creatinine                   CT head without contrast   Final Result by Corey Contreras MD  (02/06 1353)      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 Hypoattenuation of the adjacent left anterior olu which may be secondary to artifact or related to a cystic component of the lesion.    Recommend follow-up contrast-enhanced MRI of the brain and IACs for further evaluation.      Chronic microangiopathic changes.      The study was marked in EPIC for immediate notification.                  Resident: KIRIT ABBOTT I, the attending radiologist, have reviewed the images and agree with the final report above.      Workstation performed: [id number]         CT spine cervical without contrast   Final Result by Corey Contreras MD (02/06 1344)      No cervical spine fracture or traumatic malalignment.                  Resident: KIRIT ABBOTT I, the attending radiologist, have reviewed the images and agree with the final report above.      Workstation performed: [id number]         CT chest abdomen pelvis wo contrast   ED Interpretation by Annette Maria Palladino, DO (02/06 1230)   Gas presenting      Final Result by Autumn Andrew MD (02/06 1356)   Scrotal wall edema. The scrotum is incompletely imaged.   No soft tissue gas or drainable fluid collection.   No acute intra-abdominal pathology.   No radiographic findings of osteomyelitis.   Incidental findings, as per the body of the report.                  Workstation performed: [id number]               Procedures  ECG 12 Lead Documentation Only    Date/Time: 2/6/2024 11:24 AM    Performed by: Annette Maria Palladino, DO  Authorized by: Annette Maria Palladino, DO    Indications / Diagnosis:  Syncope  ECG reviewed by me, the ED Provider: yes    Patient location:  ED  Previous ECG:     Previous ECG:  Compared to current    Similarity:  No change    Comparison to cardiac monitor: Yes    Interpretation:     Interpretation: abnormal    Rate:     ECG rate:  106.    ECG rate  assessment: tachycardic    Rhythm:     Rhythm: sinus rhythm    QRS:     QRS axis:  Left    QRS intervals:  Wide  Conduction:     Conduction: abnormal      Abnormal conduction: complete RBBB    ST segments:     ST segments:  Abnormal  T waves:     T waves: non-specific          ED Course  ED Course as of 02/07/24 1044   Tue Feb 06, 2024   1126 Blood Pressure: 98/55   1126 Temperature: 97.5 °F (36.4 °C)   1126 Pulse: 102   1126 Respirations: 18   1126 SpO2: 95 %  Patient was evaluated immediately.  Septic workup was initiated given patient's findings.  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  Patient received bolus fluids as well as empiric antibiotics right away.  Patient poor historian unable to tell us if he fell and hit his head CT C-spine as well as CT head for intracranial process also obtained given that the patient is on anticoagulation.  Attempts were made to call patient's contacts in chart without success.   1127 LEFT VENTRICLE:  Systolic function was normal. Ejection fraction was estimated to be 55 %.  Although no diagnostic regional wall motion abnormality was identified, this possibility cannot be completely excluded on the basis of this study.  Wall thickness was at the upper limits of normal.  Doppler parameters were consistent with abnormal left ventricular relaxation (grade 1 diastolic dysfunction).     1150 WBC: 6.07   1150 Hemoglobin(!): 10.5   1212 hs TnI 0hr(!): 189   1213 ANION GAP: 20   1213 GLUCOSE: 68   1214 Metabolic acidosis; worsening renal function. CC   1225 Spoke to surgery   1229 LACTIC ACID(!!): 4.7   1232 Procalcitonin(!): 23.76   1330 Surgical consult placed however given patient's low blood pressure and concern for NSTI spoke with Dr. Beverly who came down to evaluate patient.   1345 After surgery spoke with patient's family they do not want any heroic actions to be made no surgery of patient.   Patient is a level 3 they do want to continue with antibiotic care.  Reached out to critical care for admission of this patient given that patient is septic.  Patient is to be admitted.  Spoke to patient's niece is at bedside all questions and concerns were answered.  Patient resting comfortably at this time.               Identification of Seniors at Risk      Flowsheet Row Most Recent Value   (ISAR) Identification of Seniors at Risk    Before the illness or injury that brought you to the Emergency, did you need someone to help you on a regular basis? 1 Filed at: 02/06/2024 1107   In the last 24 hours, have you needed more help than usual? 0 Filed at: 02/06/2024 1107   Have you been hospitalized for one or more nights during the past 6 months? --   In general, do you see well? 0 Filed at: 02/06/2024 1107   In general, do you have serious problems with your memory? 1 Filed at: 02/06/2024 1107   Do you take more than three different medications every day? 1 Filed at: 02/06/2024 1107   ISAR Score 3 Filed at: 02/06/2024 1107                   Initial Sepsis Screening       Row Name 02/06/24 1602                Is the patient's history suggestive of a new or worsening infection? Yes (Proceed)  -ZB        Suspected source of infection soft tissue  -ZB        Indicate SIRS criteria Tachycardia > 90 bpm;Tachypnea > 20 resp per min;Leukocytosis (WBC > 75017 IJL) OR Leukopenia (WBC <4000 IJL) OR Bandemia (WBC >10% bands)  -ZB        Are two or more of the above signs & symptoms of infection both present and new to the patient? Yes (Proceed)  -ZB        Assess for evidence of organ dysfunction: Are any of the below criteria present within 6 hours of suspected infection and SIRS criteria that are NOT considered to be chronic conditions? Lactate >/equal 4.0  -ZB        Date of presentation of septic shock 02/06/24  -ZB        Time of presentation of septic shock 1200  -ZB        Fluid Resuscitation: A lesser volume than 30  "ml/kg IV fluid will be given  -ZB        The 30 mL/kg fluid bolus was not given to the patient despite having hypotension, a lactate of >= 4 mmol/L, or documentation of septic shock secondary to: Concern for fluid/volume overload;BP responded to a lesser fluid volume  -ZB        Instead of the 30 ml/kg fluid bolus, the following volume of crystalloid fluid will be ordered: Other (document in comments)  2L given by ED, 1L NSS and 1L Isolyte  -ZB        Of the following fluid type: Other (document in comments)  1L NSS 1L Isolyte given by ED  -ZB        Is the patient is persistently hypotensive in the hour after fluid bolus administration? If yes, patient meets criteria for vasopressor use. --  Lactate decreased and MAP maintained ~70 following fluid administration  -ZB        Sepsis Note: Click \"NEXT\" below (NOT \"close\") to generate sepsis note based on above information. YES (proceed by clicking \"NEXT\")  -ZB                  User Key  (r) = Recorded By, (t) = Taken By, (c) = Cosigned By      Initials Name Provider Type    JULIANNA Kaufman MD Resident                  Default Flowsheet Data (last 720 hours)       Sepsis Reassess       Row Name 02/06/24 1606                   Repeat Volume Status and Tissue Perfusion Assessment Performed    Date of Reassessment: 02/06/24  -ZB        Time of Reassessment: 1600  -ZB        Sepsis Reassessment Note: Click \"NEXT\" below (NOT \"close\") to generate sepsis reassessment note. YES (proceed by clicking \"NEXT\")  -ZB        Repeat Volume Status and Tissue Perfusion Assessment Performed --                  User Key  (r) = Recorded By, (t) = Taken By, (c) = Cosigned By      Initials Name Provider Type    JULIANNA Kaufman MD Resident                  SBIRT 22yo+      Flowsheet Row Most Recent Value   Initial Alcohol Screen: US AUDIT-C     1. How often do you have a drink containing alcohol? 0 Filed at: 02/06/2024 1107   Audit-C Score 0 Filed at: 02/06/2024 1107   NATI: How many " times in the past year have you...    Used an illegal drug or used a prescription medication for non-medical reasons? Never Filed at: 02/06/2024 1107                  Medical Decision Making  Amount and/or Complexity of Data Reviewed  Labs: ordered. Decision-making details documented in ED Course.  Radiology: ordered and independent interpretation performed.    Risk  Prescription drug management.  Decision regarding hospitalization.          Disposition  Final diagnoses:   Syncope   SIRS (systemic inflammatory response syndrome) ([facility])   Cellulitis   VAN (acute kidney injury) ([facility])   Hypotension     Time reflects when diagnosis was documented in both MDM as applicable and the Disposition within this note       Time User Action Codes Description Comment    2/6/2024 11:40 AM Palladino Lauren Add [R55] Syncope     2/6/2024 11:40 AM Palladino Lauren Add [R65.10] SIRS (systemic inflammatory response syndrome) (HCC)     2/6/2024 12:31 PM PalladinoAutumn richardsonette 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     2/6/2024  1:59 PM PalladinoAutumn richardsonette 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     2/6/2024  1:59 PM PalladinoAutumn richardsonette Add [L03.90] Cellulitis     2/6/2024  3:04 PM Bakhtin, Chilo Add [A41.9,  R65.21] Septic shock (HCC)     2/6/2024  3:04 PM Bakhtin, Chilo Modify [A41.9,  R65.21] Septic shock (HCC)     2/6/2024  3:04 PM Bakhtin, Chilo Modify [A41.9,  R65.21] Septic shock (HCC)     2/6/2024  3:06 PM Bakhtin, Chilo Add [L03.818] Cellulitis of other specified site     2/6/2024  3:21 PM Bakhtin, Chilo Modify [R55] Syncope     2/6/2024  3:21 PM Bakhtin, Chilo Modify [R65.10] SIRS (systemic inflammatory response syndrome) (HCC)     2/6/2024  3:21 PM Bakhtin, Chilo Modify [L03.90] Cellulitis     2/6/2024  3:21 PM Bakhtin, Chilo Modify [A41.9,  R65.21] Septic shock (HCC)     2/6/2024  3:21 PM Chilo Kaufman Modify [L03.818] Cellulitis of other specified site     2/7/2024  9:27 AM Flakito Clifton Add [R54] 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     2/7/2024 10:43 AM Palladino, Annette Add [N17.9] VAN (acute kidney injury) (HCC)     2/7/2024 10:44 AM Palladino, Annette Add [I95.9] Hypotension           ED Disposition       ED Disposition   Admit    Condition   Stable    Date/Time   Tue Feb 6, 2024 1140    Comment   Case was discussed with               Follow-up Information    None         Current Discharge Medication List        CONTINUE these medications which have NOT CHANGED    Details   allopurinol (ZYLOPRIM) 100 mg tablet Take 1 tablet (100 mg total) by mouth 2 (two) times a day  Qty: 60 tablet, Refills: 2    Associated Diagnoses: Chronic gout without tophus, unspecified cause, unspecified site      amLODIPine (NORVASC) 5 mg tablet Take 1 tablet (5 mg total) by mouth daily  Qty: 30 tablet, Refills: 3    Associated Diagnoses: Essential hypertension      cholecalciferol (VITAMIN D3) 1,000 units tablet Take 1 tablet (1,000 Units total) by mouth daily  Qty: 100 tablet, Refills: 3    Associated Diagnoses: Vitamin D deficiency      ferrous sulfate 325 (65 Fe) mg tablet Take 1 tablet (325 mg total) by mouth daily with breakfast  Qty: 30 tablet, Refills: 2    Associated Diagnoses: Iron deficiency      furosemide (LASIX) 40 mg tablet Take 1 tablet (40 mg total) by mouth daily  Qty: 30 tablet, Refills: 3    Associated Diagnoses: Chronic diastolic congestive heart failure (HCC)      levothyroxine (Euthyrox) 112 mcg tablet Take 1 tablet (112 mcg total) by mouth daily in the early morning  Qty: 90 tablet, Refills: 1    Associated Diagnoses: Acquired hypothyroidism      metoprolol succinate (TOPROL-XL) 25 mg 24 hr tablet Take 1 tablet (25 mg total) by mouth daily  Qty: 30 tablet, Refills: 3    Associated Diagnoses: Chronic diastolic congestive heart failure (HCC)      neomycin-bacitracin-polymyxin (NEOSPORIN) 5-400-5,000 ointment Apply topically 3 (three) times a day for 5 days  Qty: 28.3 g, Refills: 0    Associated Diagnoses: Laceration of right  thumb           No discharge procedures on file.    PDMP Review       None             ED Provider  Attending physically available and evaluated Haris Lau. I managed the patient along with the ED Attending.    Electronically Signed by           Annette Maria Palladino, DO  02/07/24 1043

## 2024-02-06 NOTE — ASSESSMENT & PLAN NOTE
Patient presenting with acute kidney injury superimposed on chronic kidney disease; evidenced by increase in serum creatinine > 1.5 times baseline within the last 7 days with baseline creatinine ~2.  Suspect etiology is prerenal with hypoperfusion due to hypovolemia and low effective circulating volume due to septic shock.    Plan:  - Patient level 4 comfort care, withdrawing further work up and management of VAN

## 2024-02-06 NOTE — ASSESSMENT & PLAN NOTE
Wt Readings from Last 3 Encounters:   01/30/24 96.6 kg (213 lb)   10/03/23 95.3 kg (210 lb)   07/11/23 96.8 kg (213 lb 6.4 oz)     -Patient has history of HFpEF, on Lasix 40 mg daily at home.  Additionally on Toprol-XL 25 mg daily.    Plan:  -Patient on comfort care, withdrawing further hemodynamic monitoring and management

## 2024-02-06 NOTE — PROGRESS NOTES
Haris Lau is a 89 y.o. male who is currently ordered Vancomycin IV with management by the Pharmacy Consult service.  Relevant clinical data and objective / subjective history reviewed.  Vancomycin Assessment:  Indication and Goal AUC/Trough: Soft tissue (goal -600, trough >10)  Clinical Status: stable  Micro:   2/6 bld cx x2: in process   Renal Function:  SCr: 4.54 mg/dL  CrCl: 10.2 mL/min  Renal replacement: Not on dialysis  Days of Therapy: 1  Current Dose: 1250 mg x1  ID consulted    Vancomycin Plan:  New Dosing: Pulse dosing 1000 mg prn random level <15 mcg/ml   Estimated AUC: n/a  Estimated Trough: n/a  Next Level: Random level 2/7 with AM labs   Renal Function Monitoring: Daily BMP and UOP  Pharmacy will continue to follow closely for s/sx of nephrotoxicity, infusion reactions and appropriateness of therapy.  BMP and CBC will be ordered per protocol. We will continue to follow the patient’s culture results and clinical progress daily.    Becka Wayne, Pharmacist

## 2024-02-06 NOTE — CONSULTS
Consultation - General Surgery   Haris Lau 89 y.o. male MRN: [id number]  Unit/Bed#: [facility] Encounter: [id number]    Assessment/Plan     Assessment:  Cellulitis and pressure necrosis of scrotum  Metabolic acidosis  Plan:  Broad spectrum abx  IV fluid resuscitation  Check and correct electrolytes per protocol  Check and correct glucose per protocol  Palliative care consult  SLIM evaluation for admission  Spoke at length with NIRMAL Tara Gomes. If patient required surgery it would be a wide and disfiguring perineal debridement with multiple follow up procedures, will long term dependant care. At this time the family wishes to pursue medical treatments but does not want invasive surgical therapy.    History of Present Illness   History, ROS and PFSH unobtainable from any source due to pt is obtunded  HPI:  Haris Lau is a 89 y.o. male who presents with being found at home after syncopal episodes. At this point is denying pain but is not alert and oriented to place or time.   Family says he was not in good health over the last 3 months. Lives at home alone with wife who has end-stage dementia        Review of Systems   Reason unable to perform ROS: Pt not oriented and cannot answer these questions.       Historical Information   Past Medical History:   Diagnosis Date    VAN (acute kidney injury) (formerly [facility])     Atrial flutter (HCC)     CHF (congestive heart failure) (formerly [facility])     CKD (chronic kidney disease) stage 3, GFR 30-59 ml/min (HCC)     CKD (chronic kidney disease) stage 4, GFR 15-29 ml/min (HCC) 5/3/2021    Hypertension     Obstructed, uropathy     Sepsis (formerly [facility])     Venous stasis     Venous stasis ulcer (formerly [facility]) 6/14/2017     Past Surgical History:   Procedure Laterality Date    PROSTATE SURGERY      TONSILLECTOMY      TRANSURETHRAL RESECTION OF PROSTATE       Social History   Social History     Substance and Sexual Activity   Alcohol Use No     Social History     Substance and Sexual Activity   Drug Use No      E-Cigarette/Vaping    E-Cigarette Use Never User      E-Cigarette/Vaping Substances    Nicotine No     THC No     CBD No     Flavoring No     Other No     Unknown No      Social History     Tobacco Use   Smoking Status Never   Smokeless Tobacco Never     Family History: non-contributory    Meds/Allergies   all current active meds have been reviewed  No Known Allergies    Objective   First Vitals:   Blood Pressure: 98/55 (02/06/24 1105)  Pulse: 102 (02/06/24 1105)  Temperature: 97.5 °F (36.4 °C) (02/06/24 1105)  Respirations: 18 (02/06/24 1105)  SpO2: 95 % (02/06/24 1105)    Current Vitals:   Blood Pressure: 105/54 (02/06/24 1315)  Pulse: 104 (02/06/24 1315)  Temperature: 97.5 °F (36.4 °C) (02/06/24 1105)  Respirations: 20 (02/06/24 1315)  SpO2: 96 % (02/06/24 1315)      Intake/Output Summary (Last 24 hours) at 2/6/2024 1424  Last data filed at 2/6/2024 1329  Gross per 24 hour   Intake 2150 ml   Output --   Net 2150 ml       Invasive Devices       Peripheral Intravenous Line  Duration             Peripheral IV 02/06/24 Dorsal (posterior);Left Hand <1 day    Peripheral IV 02/06/24 Dorsal (posterior);Right Wrist <1 day                    Physical Exam  Constitutional:       General: He is in acute distress.      Appearance: He is toxic-appearing.   HENT:      Head: Atraumatic.      Mouth/Throat:      Mouth: Mucous membranes are dry.      Pharynx: No posterior oropharyngeal erythema.   Eyes:      General: No scleral icterus.     Conjunctiva/sclera: Conjunctivae normal.   Cardiovascular:      Rate and Rhythm: Tachycardia present.      Pulses: Normal pulses.      Heart sounds: No murmur heard.  Pulmonary:      Breath sounds: Normal breath sounds. No stridor. No wheezing.   Abdominal:      General: Abdomen is flat. There is no distension.      Tenderness: There is no abdominal tenderness.   Genitourinary:     Comments: Entire scrotum with erythema and edema. Posterior scrotum with ischemic changes more consistent with  "tissue pressure than infection. No crepitus or piyush pus  Musculoskeletal:      Cervical back: No rigidity or tenderness.      Right lower leg: Edema present.      Left lower leg: Edema present.   Skin:     Comments: Fungal rash in lower abdominal wall creases and groin creases   Neurological:      Mental Status: He is disoriented.         Lab Results: I have personally reviewed pertinent lab results.  , CBC:   Lab Results   Component Value Date    WBC 6.07 02/06/2024    HGB 10.5 (L) 02/06/2024    HCT 32.6 (L) 02/06/2024     (H) 02/06/2024     (L) 02/06/2024    RBC 3.15 (L) 02/06/2024    MCH 33.3 02/06/2024    MCHC 32.2 02/06/2024    RDW 17.4 (H) 02/06/2024    MPV 11.1 02/06/2024    NRBC 1 02/06/2024   , CMP:   Lab Results   Component Value Date    SODIUM 141 02/06/2024    K 5.1 02/06/2024     02/06/2024    CO2 16 (L) 02/06/2024    BUN 94 (H) 02/06/2024    CREATININE 4.54 (H) 02/06/2024    CALCIUM 8.7 02/06/2024     (H) 02/06/2024    ALT 92 (H) 02/06/2024    ALKPHOS 37 02/06/2024    EGFR 10 02/06/2024   , Coagulation:   Lab Results   Component Value Date    INR 1.40 (H) 02/06/2024   , Urinalysis: No results found for: \"COLORU\", \"CLARITYU\", \"SPECGRAV\", \"PHUR\", \"LEUKOCYTESUR\", \"NITRITE\", \"PROTEINUA\", \"GLUCOSEU\", \"KETONESU\", \"BILIRUBINUR\", \"BLOODU\"  Imaging: I have personally reviewed pertinent reports.    EKG, Pathology, and Other Studies: I have personally reviewed pertinent films in PACS      CT ABD/Pelvis - significant scrotal edema and soft tissue stranding    Counseling / Coordination of Care  I have spent a total time of > 50 minutes on 02/06/24 in caring for this patient including Diagnostic results, Prognosis, Patient and family education, Impressions, Counseling / Coordination of care, Documenting in the medical record, Reviewing / ordering tests, medicine, procedures  , Obtaining or reviewing history  , and Communicating with other healthcare professionals . This includes review of " all radiology results, discussion with family about risks and benefits of surgical debridement and coordination of care with ED and Internal medical teams

## 2024-02-06 NOTE — PROGRESS NOTES
Pastoral Care Progress Note    2024  Patient: Haris Lau : 6/3/1931  Admission Date & Time: 2024 1100  MRN: [id number] CSN: [id number]         responded to a consult request.  The patient was sound asleep but  was able to speak with his niece, the POA.  The niece was a bit overwhelmed as the patients wife was also brought to the ER and she was going back and forth between their rooms.   remains available.   24 1700   Clinical Encounter Type   Visited With Family   Referral From Nurse                  Chaplaincy Interventions Utilized:   Empowerment: Encouraged focus on present and Encouraged self-care    Exploration: Explored emotional needs & resources and Explored spiritual needs & resources    Collaboration: Encouraged adherence to treatment plan    Relationship Building: Cultivated a relationship of care and support and Listened empathically    Chaplaincy Outcomes Achieved:  Expressed gratitude

## 2024-02-06 NOTE — ED NOTES
Systolic BP in the 60's done by Dr West. POA brought to bedside.     Flroinda Encarnacion, TRACY  02/06/24 0901

## 2024-02-06 NOTE — ASSESSMENT & PLAN NOTE
Patient with history of hypertension on amlodipine 5 mg daily, Toprol-XL 25 mg daily at home.    Plan:  -Will hold home antihypertensive and beta-blocker given current septic shock with hypotension  -Holding further management as patient is on comfort care

## 2024-02-06 NOTE — H&P
INTERNAL MEDICINE RESIDENCY ADMISSION H&P     Name: Haris Lau   Age & Sex: 89 y.o. male   MRN: [id number]  Unit/Bed#: [facility]   Encounter: [id number]  Primary Care Provider: Sin Moreno MD    Code Status: Level 3 - DNAR and DNI  Admission Status: INPATIENT   Disposition: Patient requires Med/Surg    Admit to team: SOD Team B     ASSESSMENT/PLAN     Principal Problem:    Septic shock (HCC)  Active Problems:    Essential hypertension    Acquired hypothyroidism    Chronic diastolic congestive heart failure (HCC)    Acute kidney injury (HCC)    Elevated troponin      Elevated troponin  Assessment & Plan  Patient noted to have troponin in the ED.  Suspect this is likely due to demand ischemia in the setting of septic shock, with hypoperfusion.  Troponin trend: 189-165-179    On my read, twelve-lead appears to be A-fib with rate of approximately 100 to 110 bpm, nonspecific ST-T wave changes.      Acute kidney injury (HCC)  Assessment & Plan  Patient presenting with acute kidney injury superimposed on chronic kidney disease; evidenced by increase in serum creatinine > 1.5 times baseline within the last 7 days with baseline creatinine ~2.  Suspect etiology is prerenal with hypoperfusion due to hypovolemia and low effective circulating volume due to septic shock.    Plan:  - avoid nephrotoxic medications if possible  - optimize BP to prevent further progression of kidney disease  - monitor BMP  - monitor electrolytes  - monitor intake and urine output  - IV fluid resuscitation with crystalloids    Chronic diastolic congestive heart failure (HCC)  Assessment & Plan  Wt Readings from Last 3 Encounters:   01/30/24 96.6 kg (213 lb)   10/03/23 95.3 kg (210 lb)   07/11/23 96.8 kg (213 lb 6.4 oz)     Patient has history of HFpEF, on Lasix 40 mg daily at home.  Additionally on Toprol-XL 25 mg daily.  Will hold diuretics as patient is in septic shock and intravascularly depleted.  Rates currently in the 100s, appropriate.   Will hold off on beta-blocker at this time.    Acquired hypothyroidism  Assessment & Plan  Patient on levothyroxine at home, unclear if he has been adherent to this.    - Will not continue at this time as patient is altered, critically ill    Essential hypertension  Assessment & Plan  Patient with history of hypertension on amlodipine 5 mg daily, Toprol-XL 25 mg daily at home.    Will hold home antihypertensive and beta-blocker given current septic shock with hypotension.  Rates currently 100s, appropriate.    * Septic shock (HCC)  Assessment & Plan  - Patient presenting with HR > 90, MAP ~70's, lactate 4.7 to 3.5 (after 2L fluids).  -Evidence of multiorgan dysfunction with elevated troponin, elevated creatinine from baseline.  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   - Decision made to not pursue aggressive interventions after extensive discussion with family/POA between surgical team, critical care team and primary medicine service.  - Will continue to treat with IV fluids, antibiotics.  - In the event patient decompensates further, no plans to treat with pressors or escalating care to the ICU, neither should patient be intubated or resuscitation attempted.  - Family is in agreement to pursue comfort measures should he not improve with antibiotics/fluids alone.    Plan:  - treating with 3rd liter of IVF, c/w 100 cc/hr maintenance thereafter  - Vancomycin/Zosyn for antibiotics; infectious disease following, appreciate input  - SD2 for closer monitoring of hemodynamics  - will also consult palliative care given very guarded prognosis and plan to pursue comfort measures should patient not improve; appreciate input  - recheck CMP, CBC in AM        VTE Pharmacologic Prophylaxis: Heparin  VTE Mechanical Prophylaxis: sequential compression device and foot pump applied    CHIEF COMPLAINT     Chief  Complaint   Patient presents with    Syncope     Syncopal episode while having BM.      HISTORY OF PRESENT ILLNESS     Patient is a 89-year-old male with history of HTN, CHF, CKD presenting with multiple syncopal episodes at home as well as significant redness, swelling, pain to his scrotum and perineum.  History primarily obtained from patient's niece (NIRMAL Gomes) as he himself was confused and unable to participate in interview.  He lives with his wife and functions as her primary caretaker, however more recently has been ill and unable to take care of himself.    In the ED, patient noted to have significant scrotal cellulitis, additionally found to be hypotensive, tachycardic, altered with lactate greater than 4, concerning for septic shock.  He was evaluated by surgery and underwent CT imaging due to concern for possible Christophe's gangrene.  Imaging did not show any discrete gas-forming infection, however extensive discussion was had between the surgical team and family that any attempt at source control would be incredibly invasive, disfiguring and have a very drawn out recovery.      Given his age, comorbidities and presentation, discussion was had regarding goals of care.  Family was made aware that his prognosis is poor and they agreed on making patient Level 3 DNR/DNI, do want to continue with antibiotics and IV fluids, however would like to avoid pressors and escalation of care to ICU.    Patient received 2 L of IV fluids, as well as cefepime and linezolid in the ED. He was also seen by the critical care team who spoke with family as well and agree with the plan to avoid escalating care.  Consulted infectious disease and palliative care for further assistance.  Family is in agreement that further clinical deterioration would be reasonable grounds to pursue comfort care, possibly hospice.    Patient will be admitted to [facility] to [place] [facility] under Dr. Tapia's service    REVIEW OF SYSTEMS     Review of  Systems   Unable to perform ROS: Mental status change     OBJECTIVE     Vitals:    24 1346 24 1416 24 1517 24 1616   BP: 98/51 95/50 109/52 107/50   Pulse: (!) 108 104 (!) 106 104   Resp:  20 20 20   Temp:       SpO2:  96% 95% 95%      Temperature:   Temp (24hrs), Av.5 °F (36.4 °C), Min:97.5 °F (36.4 °C), Max:97.5 °F (36.4 °C)    Temperature: 97.5 °F (36.4 °C)  Intake & Output:  I/O          0701   0700  0701   07 07 07    I.V.   1000    IV Piggyback   1150    Total Intake   2150    Net   +2150                 Weights:        There is no height or weight on file to calculate BMI.  Weight (last 2 days)       None          Physical Exam  Vitals reviewed.   Constitutional:       General: He is in acute distress.      Appearance: He is ill-appearing.   HENT:      Head: Atraumatic.      Right Ear: External ear normal.      Left Ear: External ear normal.      Nose: Nose normal.      Mouth/Throat:      Mouth: Mucous membranes are dry.      Pharynx: Oropharynx is clear.   Eyes:      Pupils: Pupils are equal, round, and reactive to light.   Cardiovascular:      Rate and Rhythm: Regular rhythm. Tachycardia present.      Pulses: Normal pulses.      Heart sounds: Normal heart sounds.   Pulmonary:      Breath sounds: Normal breath sounds.      Comments: Tachypneic.  Abdominal:      General: There is no distension.      Palpations: Abdomen is soft.      Tenderness: There is abdominal tenderness.   Musculoskeletal:      Right lower leg: No edema.      Left lower leg: No edema.   Skin:     General: Skin is warm.      Comments: Significant edema, erythema and tenderness of scrotum and perineum.   Neurological:      Mental Status: He is disoriented.       PAST MEDICAL HISTORY     Past Medical History:   Diagnosis Date    VAN (acute kidney injury) (HCC)     Atrial flutter (HCC)     CHF (congestive heart failure) (HCC)     CKD (chronic kidney disease) stage 3, GFR 30-59  ml/min ([facility])     CKD (chronic kidney disease) stage 4, GFR 15-29 ml/min ([facility]) 5/3/2021    Hypertension     Obstructed, uropathy     Sepsis ([facility])     Venous stasis     Venous stasis ulcer ([facility]) 6/14/2017     PAST SURGICAL HISTORY     Past Surgical History:   Procedure Laterality Date    PROSTATE SURGERY      TONSILLECTOMY      TRANSURETHRAL RESECTION OF PROSTATE       SOCIAL & FAMILY HISTORY     Social History     Substance and Sexual Activity   Alcohol Use No       Social History     Substance and Sexual Activity   Drug Use No     Social History     Tobacco Use   Smoking Status Never   Smokeless Tobacco Never     History reviewed. No pertinent family history.  LABORATORY DATA     Labs: I have personally reviewed pertinent reports.    Results from last 7 days   Lab Units 02/06/24  1134   WBC Thousand/uL 6.07   HEMOGLOBIN g/dL 10.5*   HEMATOCRIT % 32.6*   PLATELETS Thousands/uL 139*   MONO PCT % 12   EOS PCT % 0      Results from last 7 days   Lab Units 02/06/24  1134   POTASSIUM mmol/L 5.1   CHLORIDE mmol/L 105   CO2 mmol/L 16*   BUN mg/dL 94*   CREATININE mg/dL 4.54*   CALCIUM mg/dL 8.7   ALK PHOS U/L 37   ALT U/L 92*   AST U/L 293*              Results from last 7 days   Lab Units 02/06/24  1150   INR  1.40*   PTT seconds 28     Results from last 7 days   Lab Units 02/06/24  1450   LACTIC ACID mmol/L 3.5*         Micro:  Lab Results   Component Value Date    BLOODCX Received in Microbiology Lab. Culture in Progress. 02/06/2024    BLOODCX Received in Microbiology Lab. Culture in Progress. 02/06/2024    BLOODCX No Growth After 5 Days. 06/13/2017    BLOODCX No Growth After 5 Days. 06/13/2017    URINECX 50,000-59,000 cfu/ml 06/11/2018     IMAGING & DIAGNOSTIC TESTS     Imaging: I have personally reviewed pertinent reports.    CT chest abdomen pelvis wo contrast    Result Date: 2/6/2024  Impression: Scrotal wall edema. The scrotum is incompletely imaged. No soft tissue gas or drainable fluid collection. No acute  intra-abdominal pathology. No radiographic findings of osteomyelitis. Incidental findings, as per the body of the report. 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 Hypoattenuation of the adjacent left anterior olu which may be secondary to artifact or related to a cystic component of the lesion. Recommend follow-up contrast-enhanced MRI of the brain and IACs for further evaluation. Chronic microangiopathic changes. The study was marked in EPIC for immediate notification. Resident: KIRIT ABBOTT I, the attending radiologist, have reviewed the images and agree with the final report above. Workstation performed: [id number]     CT spine cervical without contrast    Result Date: 2/6/2024  Impression: No cervical spine fracture or traumatic malalignment. Resident: KIRIT ABBOTT I, the attending radiologist, have reviewed the images and agree with the final report above. Workstation performed: [id number]     EKG, Pathology, and Other Studies: I have personally reviewed pertinent reports.     ALLERGIES   No Known Allergies  MEDICATIONS PRIOR TO ARRIVAL     Prior to Admission medications    Medication Sig Start Date End Date Taking? Authorizing Provider   allopurinol (ZYLOPRIM) 100 mg tablet Take 1 tablet (100 mg total) by mouth 2 (two) times a day 12/18/23   Sin Moreno MD   amLODIPine (NORVASC) 5 mg tablet Take 1 tablet (5 mg total) by mouth daily 12/18/23   Sin Moreno MD   cholecalciferol (VITAMIN D3) 1,000 units tablet Take 1 tablet (1,000 Units total) by mouth daily 8/18/23   Sin Moreno MD   ferrous sulfate 325 (65 Fe) mg tablet Take 1 tablet (325 mg total) by mouth daily with breakfast 12/20/23   Sin Moreno MD   furosemide (LASIX) 40 mg tablet Take 1 tablet (40 mg total) by mouth daily 1/30/24   Sin Moreno MD    levothyroxine (Euthyrox) 112 mcg tablet Take 1 tablet (112 mcg total) by mouth daily in the early morning 1/30/24   Sin Moreno MD   metoprolol succinate (TOPROL-XL) 25 mg 24 hr tablet Take 1 tablet (25 mg total) by mouth daily 1/30/24   Sin Moreno MD   neomycin-bacitracin-polymyxin (NEOSPORIN) 5-400-5,000 ointment Apply topically 3 (three) times a day for 5 days  Patient not taking: Reported on 1/13/2021 7/24/20 7/29/20  Francisco Sheldon PA-C     MEDICATIONS ADMINISTERED IN LAST 24 HOURS     Medication Administration - last 24 hours from 02/05/2024 1752 to 02/06/2024 1752         Date/Time Order Dose Route Action Action by     02/06/2024 1234 EST cefepime (MAXIPIME) 2 g/50 mL dextrose IVPB 0 mg Intravenous Stopped Florinda Encarnacion RN     02/06/2024 1203 EST cefepime (MAXIPIME) 2 g/50 mL dextrose IVPB 2,000 mg Intravenous New Bag Florinda Encarnacion RN     02/06/2024 1232 EST vancomycin (VANCOCIN) 1500 mg in sodium chloride 0.9% 250 mL IVPB -- Intravenous Canceled Entry Becka Wayne, Pharmacist     02/06/2024 1329 EST metroNIDAZOLE (FLAGYL) IVPB (premix) 500 mg 100 mL 0 mg Intravenous Stopped Florinda Encarnacion RN     02/06/2024 1246 EST metroNIDAZOLE (FLAGYL) IVPB (premix) 500 mg 100 mL 500 mg Intravenous New Bag Florinda Encarnacion RN     02/06/2024 1329 EST multi-electrolyte (ISOLYTE-S PH 7.4) bolus 1,000 mL 0 mL Intravenous Stopped Florinda Encarnacion RN     02/06/2024 1246 EST multi-electrolyte (ISOLYTE-S PH 7.4) bolus 1,000 mL 1,000 mL Intravenous New Bag Florinda Encarnacion RN     02/06/2024 1234 EST sodium chloride 0.9 % bolus 1,000 mL 0 mL Intravenous Stopped Florinda Encarnacion RN     02/06/2024 1234 EST sodium chloride 0.9 % bolus 1,000 mL 1,000 mL Intravenous New Bag Florinda Encarnacion RN     02/06/2024 1450 EST linezolid (ZYVOX) IVPB 300 mg 0 mg Intravenous Stopped Florinda Encarnacion RN     02/06/2024 1350 EST linezolid (ZYVOX) IVPB 300 mg 300 mg Intravenous New Bag Florinda Encarnacion RN      "02/06/2024 1618 EST heparin (porcine) subcutaneous injection 5,000 Units 5,000 Units Subcutaneous Given Josh Ding RN     02/06/2024 1717 EST multi-electrolyte (PLASMALYTE-A/ISOLYTE-S PH 7.4) IV solution 100 mL/hr Intravenous [place] Encarnacion RN     02/06/2024 1619 EST multi-electrolyte (PLASMALYTE-A/ISOLYTE-S PH 7.4) IV solution 100 mL/hr Intravenous New Bag Florinda Encarnacion RN     02/06/2024 1618 EST multi-electrolyte (PLASMALYTE-A/ISOLYTE-S PH 7.4) IV solution 100 mL/hr Intravenous New Bag Josh Ding RN     02/06/2024 1736 EST vancomycin (VANCOCIN) 1,250 mg in sodium chloride 0.9 % 250 mL IVPB 1,250 mg Intravenous [place] Encarnacion RN     02/06/2024 1717 EST sodium chloride 0.9 % bolus 1,000 mL 1,000 mL Intravenous [place] Encarnacion RN          CURRENT MEDICATIONS     Current Facility-Administered Medications   Medication Dose Route Frequency Provider Last Rate    [START ON 2/7/2024] cefepime  1,000 mg Intravenous Q24H Zoey Hernandez MD      heparin (porcine)  5,000 Units Subcutaneous Q8H [facility] Chilo Kaufman MD      [START ON 2/7/2024] levothyroxine  112 mcg Oral Early Morning Chilo Kaufman MD      multi-electrolyte  100 mL/hr Intravenous Continuous Chilo Kaufman  mL/hr (02/06/24 1717)    vancomycin  15 mg/kg (Adjusted) Intravenous Once Zoey Hernandez MD 1,250 mg (02/06/24 1736)    [START ON 2/7/2024] vancomycin  12.5 mg/kg (Adjusted) Intravenous PRN Joseluis Tapia MD       multi-electrolyte, 100 mL/hr, Last Rate: 100 mL/hr (02/06/24 1717)      [START ON 2/7/2024] vancomycin, 12.5 mg/kg (Adjusted), PRN        Admission Time  I spent 30 minutes admitting the patient.  This involved direct patient contact where I performed a full history and physical, reviewing previous records, and reviewing laboratory and other diagnostic studies.    Portions of the record may have been created with voice recognition software.  Occasional wrong word or \"sound a like\" " substitutions may have occurred due to the inherent limitations of voice recognition software.  Read the chart carefully and recognize, using context, where substitutions have occurred.    ==  Chilo Kaufman MD  [facility]  Internal Medicine Residency PGY-1

## 2024-02-06 NOTE — QUICK NOTE
Evaluated patient at bedside. MAP 68-70 while resting comfortable on room air. Patient denies all symptoms, has poor insight beyond basic orientation, and asks to be left alone. Scrotum is erythematous and swollen.    Conducted bedside discussion with myself, Dr. Benton, and patient's family including Mireya, patient's POA. Discussed code status and goals of care. After discussion, POA is agreeable to level 3 DNR. After discussion in regards to goals of care including vasopressors, POA feels that they would prefer not to seek escalation of care and to instead opt for palliative measures. Current plan as discussed with family is for admission to medical floor as level 3 DNR, with plans for palliative care if patient's condition worsens to the point of requiring intensive critical care. Patient's POA expresses understanding and notes that patient has been very resistant to medical care for some time. All questions answered at this time.

## 2024-02-06 NOTE — ASSESSMENT & PLAN NOTE
Patient noted to have troponin in the ED.  Suspect this is likely due to demand ischemia in the setting of septic shock, with hypoperfusion.  Troponin trend: 189-165-179    On my read, twelve-lead appears to be A-fib with rate of approximately 100 to 110 bpm, nonspecific ST-T wave changes.

## 2024-02-06 NOTE — ASSESSMENT & PLAN NOTE
Patient on levothyroxine at home, unclear if he has been adherent to this.    - Will not continue at this time as patient is altered, critically ill
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
## 2024-02-06 NOTE — SEPSIS NOTE
"  Sepsis Note   Haris Lau 89 y.o. male MRN: [id number]  Unit/Bed#: QCD Encounter: [id number]       Initial Sepsis Screening       Row Name 02/06/24 1602                Is the patient's history suggestive of a new or worsening infection? Yes (Proceed)  -ZB        Suspected source of infection soft tissue  -ZB        Indicate SIRS criteria Tachycardia > 90 bpm;Tachypnea > 20 resp per min;Leukocytosis (WBC > 60390 IJL) OR Leukopenia (WBC <4000 IJL) OR Bandemia (WBC >10% bands)  -ZB        Are two or more of the above signs & symptoms of infection both present and new to the patient? Yes (Proceed)  -ZB        Assess for evidence of organ dysfunction: Are any of the below criteria present within 6 hours of suspected infection and SIRS criteria that are NOT considered to be chronic conditions? Lactate >/equal 4.0  -ZB        Date of presentation of septic shock 02/06/24  -ZB        Time of presentation of septic shock 1200  -ZB        Fluid Resuscitation: A lesser volume than 30 ml/kg IV fluid will be given  -ZB        The 30 mL/kg fluid bolus was not given to the patient despite having hypotension, a lactate of >= 4 mmol/L, or documentation of septic shock secondary to: Concern for fluid/volume overload;BP responded to a lesser fluid volume  -ZB        Instead of the 30 ml/kg fluid bolus, the following volume of crystalloid fluid will be ordered: Other (document in comments)  2L given by ED, 1L NSS and 1L Isolyte  -ZB        Of the following fluid type: Other (document in comments)  1L NSS 1L Isolyte given by ED  -ZB        Is the patient is persistently hypotensive in the hour after fluid bolus administration? If yes, patient meets criteria for vasopressor use. --  Lactate decreased and MAP maintained ~70 following fluid administration  -ZB        Sepsis Note: Click \"NEXT\" below (NOT \"close\") to generate sepsis note based on above information. YES (proceed by clicking \"NEXT\")  -ZB                  User Key  (r) = " Recorded By, (t) = Taken By, (c) = Cosigned By      Initials Name Provider Type    ZB Chilo Kaufman MD Resident                        There is no height or weight on file to calculate BMI.  Wt Readings from Last 1 Encounters:   01/30/24 96.6 kg (213 lb)        Ideal body weight: 63.8 kg (140 lb 10.5 oz)  Adjusted ideal body weight: 76.9 kg (169 lb 9.5 oz)    Chilo Kaufman MD  [facility]  Internal Medicine Residency PGY-1

## 2024-02-06 NOTE — ASSESSMENT & PLAN NOTE
- Patient presenting with HR > 90, MAP ~70's, lactate 4.7 to 3.5 (after 2L fluids).  -Evidence of multiorgan dysfunction with elevated troponin, elevated creatinine from baseline.  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   - Decision made to not pursue aggressive interventions after extensive discussion with family/POA between surgical team, critical care team and primary medicine service. Decision was made to pursue comfort care after persistent hypotension responsive only to aggressive fluid supplementation which would compromise the patient's respiratory status given his history of CHF.   - Family is in agreement to pursue comfort care    Plan:  - IVF discontinued s/p multiple liters and drip  - Vancomycin/Zosyn given for abx. Now withdrawn as patient is on comfort care.  - Med surg level for comfort care  - Palliative care consult for goals of care discussions with family  - Home vs inpatient hospice

## 2024-02-06 NOTE — ED NOTES
"Bladder scanner not working on pt due to edema. US done by Dr Cotter bladder visualized and is \"tiny.\" Pt has not voided since arriving in the ED.     Florinda Encarnacion RN  02/06/24 9783    "

## 2024-02-06 NOTE — ED NOTES
Sent tiger text to Chilo West SOD senior resident. Informed him pt has BP of 80/50. Confirmed with him pts maintenance fluids were running. Dr West ordered a liter of saline bolus. Advised me he would come and see the patient if he didn't respond to the bolus.     Florinda Encarnacion RN  02/06/24 5451

## 2024-02-06 NOTE — CONSULTS
Consultation - Infectious Disease   Haris Lau 89 y.o. male MRN: [id number]  Unit/Bed#: [facility] Encounter: [id number]      IMPRESSION & RECOMMENDATIONS:   Impression/Recommendations:  SIRS versus severe sepsis, POA.    HR, RR, lactic acidosis.  Consider severe SSTI scrotum, although no fever or WBC.  Consider bacteremia.  Consider noninfectious due to VAN, intravascular hypovolemia, effective volume overload in setting of CHF, diuresis.  Appears chronically ill but with stable hemodynamics for now.  Rec:  Continue antibiotics as below  Follow up blood cultures from admission  Follow temperatures closely  Recheck WBC in AM to monitor infection  Supportive care as per the primary service    Scrotal edema versus cellulitis.    Consider findings largely due to volume overload, incontinence.  Consider NSTI given severity of presentation.  CT negative for air.  Patient's family declined surgical intervention.  Rec:  Continue vancomycin, cefepime for now  Serial exams    Tinea cruris.    In setting of MO, incontinence  Rec:  Topical antifungals.    Chronic CHF.  On diruetics, beta-blocker at home.    VAN on CKD.  Baseline Cr 2.1.  Rec:  Follow creatinine closely and dose-adjust antibiotics as indicated    I discussed with Dr. Kaufman the above plan to continue IV antibiotics.  He agrees with the plan.    Antibiotics:  Linezolid x1  Cefepime x1    Thank you for this consultation.  We will follow along with you.    HISTORY OF PRESENT ILLNESS:  Reason for Consult: Sepsis    HPI: Haris Lau is a 89 y.o. male who is lethargic and a poor historian so the history is obtained through the medical record.  He has multiple medical problems as outlined above.  He was brought to the ED today after syncopal episode on the toilet.  Upon presentation he was noted to be afebrile with tachycardia and hypotension.  His labs revealed a normal WBC.  His lactic acid was elevated and he had a creatinine of 4.5.  His exam was notable for  scrotal swelling and tenderness.  A CT A/P showed scrotal wall edema without soft tissue gas or abscess.  He was started on broad-spectrum antibiotics.  He was evaluated by surgery who has recommended conservative measures especially in setting of family desire for DNR 3.    In performing this consult, I have reviewed prior admission and outpatient visit records in detail.    REVIEW OF SYSTEMS:  Limited due to lethargy.    PAST MEDICAL HISTORY:  Past Medical History:   Diagnosis Date    VAN (acute kidney injury) ([facility])     Atrial flutter (HCC)     CHF (congestive heart failure) ([facility])     CKD (chronic kidney disease) stage 3, GFR 30-59 ml/min (HCC)     CKD (chronic kidney disease) stage 4, GFR 15-29 ml/min ([facility]) 5/3/2021    Hypertension     Obstructed, uropathy     Sepsis ([facility])     Venous stasis     Venous stasis ulcer ([facility]) 2017     Past Surgical History:   Procedure Laterality Date    PROSTATE SURGERY      TONSILLECTOMY      TRANSURETHRAL RESECTION OF PROSTATE         FAMILY HISTORY:  Non-contributory    SOCIAL HISTORY:  Social History     Substance and Sexual Activity   Alcohol Use No     Social History     Substance and Sexual Activity   Drug Use No     Social History     Tobacco Use   Smoking Status Never   Smokeless Tobacco Never       ALLERGIES:  No Known Allergies    MEDICATIONS:  All current active medications have been reviewed, including antibiotics as outlined above.    PHYSICAL EXAM:  Vitals:  Temp:  [97.5 °F (36.4 °C)] 97.5 °F (36.4 °C)  HR:  [102-108] 104  Resp:  [18-20] 20  BP: ()/(50-59) 95/50  SpO2:  [95 %-97 %] 96 %  Temp (24hrs), Av.5 °F (36.4 °C), Min:97.5 °F (36.4 °C), Max:97.5 °F (36.4 °C)  Current: Temperature: 97.5 °F (36.4 °C)     Physical Exam:  General: Chronically ill-appearing elderly male, in no acute distress  Eyes:  Conjunctive clear with no hemorrhages or effusions  Oropharynx:  No ulcers, no lesions  Neck:  Supple, no lymphadenopathy  Lungs:  Normal respiratory  excursion, no accessory muscle use  Cardiac: Tachycardic with a regular rhythm, extremities well perfused  Abdomen:  Soft, non-tender, non-distended  Extremities:  No peripheral cyanosis, clubbing; chronic appearing nonpitting edema  Skin: 3Q read fungal like changes in groin and folds of abdominal wall  Neurological:  Moves all four extremities spontaneously, sensation grossly intact  Diffuse scrotal swelling with incontinence of urine noted, tenderness to palpation    LABS, IMAGING, & OTHER STUDIES:  Lab Results:  I have personally reviewed pertinent labs.  Results from last 7 days   Lab Units 02/06/24  1134   SODIUM mmol/L 141   POTASSIUM mmol/L 5.1   CHLORIDE mmol/L 105   CO2 mmol/L 16*   BUN mg/dL 94*   CREATININE mg/dL 4.54*   EGFR ml/min/1.73sq m 10   CALCIUM mg/dL 8.7   AST U/L 293*   ALT U/L 92*   ALK PHOS U/L 37     Results from last 7 days   Lab Units 02/06/24  1134   WBC Thousand/uL 6.07   HEMOGLOBIN g/dL 10.5*   PLATELETS Thousands/uL 139*           Imaging Studies:   I have personally reviewed the following radiographic images in PACS:  CT A/P images reviewed personally scrotal edema

## 2024-02-06 NOTE — ED ATTENDING ATTESTATION
2/6/2024  I, Clara Thompson DO, saw and evaluated the patient. I have discussed the patient with the resident/non-physician practitioner and agree with the resident's/non-physician practitioner's findings, Plan of Care, and MDM as documented in the resident's/non-physician practitioner's note, except where noted. All available labs and Radiology studies were reviewed.  I was present for key portions of any procedure(s) performed by the resident/non-physician practitioner and I was immediately available to provide assistance.       At this point I agree with the current assessment done in the Emergency Department.  I have conducted an independent evaluation of this patient a history and physical is as follows:    89-year-old male presents status post possible syncopal episode while on the toilet.  Patient unable to give any reliable history.  Currently denies pain.  On exam-appears ill, very dry mucous membranes, heart tachycardic, no respiratory distress, abdomen soft, perineal area erythematous, edematous, some discharge coming from the penis.  Scrotum edematous, erythematous, echhymotic. Ecchymosis and erythema in perineal area and upper posterior thigh.  Plan-concern for Christophe's, at risk for infection, also unclear if patient fell so possible traumatic injury, concern for cardiac injury as well.  Will do CT head, CT abdomen pelvis, send off infectious labs, consult surgery now for evaluation.  Start antibiotics.  IV fluids.    ED Course     CT called and patient has bruising on chest wall so we will do CT chest as well    Critical Care Time  CriticalCare Time    Date/Time: 2/6/2024 1:00 PM    Performed by: Clara Thompson DO  Authorized by: Clara Thompson DO    Critical care provider statement:     Critical care time (minutes):  35    Critical care time was exclusive of:  Separately billable procedures and treating other patients and teaching time    Critical care was necessary to treat or  prevent imminent or life-threatening deterioration of the following conditions:  Sepsis    Critical care was time spent personally by me on the following activities:  Examination of patient, obtaining history from patient or surrogate, re-evaluation of patient's condition, ordering and review of radiographic studies and ordering and review of laboratory studies    I assumed direction of critical care for this patient from another provider in my specialty: no        Reviewed CT with resident and question of gas in the perineal area and scrotum.  Surgery aware.

## 2024-02-07 PROBLEM — Z71.89 GOALS OF CARE, COUNSELING/DISCUSSION: Status: ACTIVE | Noted: 2024-01-01

## 2024-02-07 PROBLEM — Z51.5 PALLIATIVE CARE PATIENT: Status: ACTIVE | Noted: 2024-01-01

## 2024-02-07 NOTE — ED NOTES
Family going to speak with pts wife who is also a pt in the ED.     Florinda Encarnacion RN  02/06/24 3990

## 2024-02-07 NOTE — PROGRESS NOTES
Progress Note - Infectious Disease   Haris Lau 89 y.o. male MRN: [id number]  Unit/Bed#: NW8 865-01 Encounter: [id number]      Impression/Recommendations:  Severe sepsis, POA.    HR, RR, lactic acidosis.  Due to bacteremia, cellulitis as below.  Consider noninfectious contribution of VAN, intravascular hypovolemia, in setting of CHF, diuresis.  Appears acutely and chronically.  Rec:  Discontinue antibiotics given transition to hospice  Supportive care as per the primary service     Streptococcal bacteremia.  New.  Likely due to cellulitis as below.  Rec:  Discontinue antibiotics given transition to hospice    Scrotal cellulitis.    In setting of volume overload, incontinence.  CT negative for air.  Patient's family declined surgical intervention.  Rec:  Discontinue antibiotics given transition to hospice  [place] for comfort     Tinea cruris.    In setting of MO, incontinence  Rec:  Topical antifungals.     Chronic CHF.  On diruetics, beta-blocker at home.     VAN on CKD.  Baseline Cr 2.1.  Family electing hospice     Acute encephalopathy.  Likely multifactorial due to all of above.    Antibiotics:  Vancomycin #2  Cefepime #2    Subjective/24 Hour Events:  Patient seen on AM rounds.  Lethargic, unable to provide ROS.  Noted after seeing patient that family has elected to pursue comfort care.    Objective:  Vitals:  HR:  [100-114] 114  Resp:  [20-26] 26  BP: ()/(32-62) 89/53  SpO2:  [93 %-99 %] 93 %  No data recorded.  Current: Temperature: 97.5 °F (36.4 °C)    Physical Exam:   General:  No acute distress  Psychiatric:  Lethargic  Pulmonary:  Normal respiratory excursion without accessory muscle use  Abdomen:  Soft, nontender  Extremities:  Chronic venous stasis changes  Skin:  Fungal rash groin and abdominal folds  :  Diffuse scrotal swelling with DTI inferior portion without cellulitis    Lab Results:  I have personally reviewed pertinent labs.  Results from last 7 days   Lab Units 02/06/24  1134   SODIUM  mmol/L 141   POTASSIUM mmol/L 5.1   CHLORIDE mmol/L 105   CO2 mmol/L 16*   BUN mg/dL 94*   CREATININE mg/dL 4.54*   EGFR ml/min/1.73sq m 10   CALCIUM mg/dL 8.7   AST U/L 293*   ALT U/L 92*   ALK PHOS U/L 37     Results from last 7 days   Lab Units 02/06/24  1134   WBC Thousand/uL 6.07   HEMOGLOBIN g/dL 10.5*   PLATELETS Thousands/uL 139*     Results from last 7 days   Lab Units 02/06/24  1150   GRAM STAIN RESULT  Gram positive cocci in chains*  Gram positive cocci in chains*       Imaging Studies:   I have personally reviewed pertinent imaging study reports and images in PACS.    EKG, Pathology, and Other Studies:   I have personally reviewed pertinent reports.

## 2024-02-07 NOTE — QUICK NOTE
Spoke with niece Mireya over the phone who expressed understanding regarding the condition of her uncle. She states that it seems like it makes the most sense to keep Haris inpatient for hospice care to make him as comfortable as possible. All questions answered.

## 2024-02-07 NOTE — CONSULTS
Vancomycin Pharmacy Consult      Vancomycin has been discontinued; Pharmacy will sign off now. Thank you for involving us in this patient's care. Please do not hesitate to reach back out to Pharmacy.      Uzair Roman, PharmD  Internal Medicine Clinical Pharmacist Specialist  [phone]  [facility]

## 2024-02-07 NOTE — ASSESSMENT & PLAN NOTE
Supportive presence provided\  Anxiety containment provided  Anticipatory guidance provided  Counseling and education on hospice and palliative care provided    Follow-up: Will continue to follow in the inpatient setting and provide comprehensive comfort cares the patient

## 2024-02-07 NOTE — ASSESSMENT & PLAN NOTE
In setting of scrotal infection  Gram + cocci in chains on blood smear  Guarded prognosis, hours to days  Discontinued IV morphine  Started Dilaudid 0.5/1.0mg IV q1h prn pain/dyspnea  Started Ativan 0.5/1.0mg IV q1h prn anxiety/dyspnea

## 2024-02-07 NOTE — CASE MANAGEMENT
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/2021    Impaired fasting blood sugar 03/15/2021    Venous stasis dermatitis of both lower extremities 03/15/2021    Class 1 obesity due to excess calories with serious comorbidity and body mass index (BMI) of 31.0 to 31.9 in adult 03/15/2021    CHF (congestive heart failure) ([facility])     PVC (premature ventricular contraction) 2019    Chronic diastolic congestive heart failure (HCC) 2017    Leukocytosis 2017    Acute kidney injury (HCC) 2017    Stage 3b chronic kidney disease (HCC) 2017    Septic shock (HCC) 2017    Lower extremity edema 2017    Essential hypertension 2017    Acquired hypothyroidism 2017      LOS (days): 1  Geometric Mean LOS (GMLOS) (days): 5.1  Days to GMLOS:4     OBJECTIVE:  Risk of Unplanned Readmission Score: 12.67         Current admission status: Inpatient   Preferred Pharmacy:   [facility]  [place], [place] - [address]  [address]  [place] [address]  Phone: [phone] Fax: [phone]    Primary Care Provider: Sin Moreno MD    Primary Insurance: MEDICARE  Secondary Insurance: [facility] BLUE [facility]    DISCHARGE DETAILS:            Additional Comments: CM updated by Palliative today. pt is on comfort measures and they will reeval tomorrow if pt is appropriate to be eval by hospice and  transferred to IPU.

## 2024-02-07 NOTE — CONSULTS
Consultation - Palliative and Supportive Care   Haris Lau 89 y.o. male [id number]    Assessment/Plan:  * Septic shock (HCC)  Assessment & Plan  In setting of scrotal infection  Gram + cocci in chains on blood smear  Guarded prognosis, hours to days  Discontinued IV morphine  Started Dilaudid 0.5/1.0mg IV q1h prn pain/dyspnea  Started Ativan 0.5/1.0mg IV q1h prn anxiety/dyspnea    Goals of care, counseling/discussion  Assessment & Plan  Comfort based cares only  Spoke with jett Gomes on the phone, who ultimately wants him to be as comfortable as possible  Given high possibility of rapid deterioration in the setting of septic shock, will hold inpatient with comfort cares until tomorrow morning and reevaluate for possibility of hospice evaluation and transfer to IPU     Code Status: Comfort - Level 4   Decisional apparatus:  Patient is not competent on my exam today.  If competence is lost, patient's substitute decision maker would default to Jett Gomes by [place] Act 169.   Advance Directive / Living Will / POLST:  None on file    Palliative care patient  Assessment & Plan  Supportive presence provided\  Anxiety containment provided  Anticipatory guidance provided  Counseling and education on hospice and palliative care provided    Follow-up: Will continue to follow in the inpatient setting and provide comprehensive comfort cares the patient          I have reviewed the patient's controlled substance dispensing history in the Prescription Drug Monitoring Program in compliance with the [facility] regulations before prescribing any controlled substances.         We appreciate the invitation to be involved in this patient's care.  We will continue to follow.  Please do not hesitate to reach our on call provider through our clinic answering service at [phone] should you have acute symptom control concerns.    Daniel Rosales,   Palliative and Supportive Care  Clinic/Answering Service: [phone]  You can find me on  [employer]!       IDENTIFICATION:      Inpatient consult to Palliative Care     Date/Time  2/6/2024 11:00 AM     Performed by  Daniel Rosales DO   Authorized by  Flakito Clifton DO           Physician Requesting Consult: Joseluis Tapia MD  Reason for Consult / Principal Problem: Symptom management  Hx and PE limited by: Acute illness    NARRATIVE AND INTERVAL HISTORY:       Haris Lau is a 89 y.o. male who presents on 2/6/2024 for multiple episodes of syncope at home as well as redness, swelling, and pain of his scrotum and perineal area.  His niece Mireya, was the one who recommended he be hospitalized, and provided most of the history as he was encephalopathic on arrival.  Further information reveals that he is the primary caretaker for his wife Corinne, who has Alzheimer's dementia and is also hospitalized due to fall and rhabdomyolysis.    Upon admission, he met criteria concerning for severe sepsis/septic shock with multiorgan failure.  Labs revealed BUN 94, creatinine 4.54, , ALT 92, lactic acid 4.7.  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  Given his age, comorbidities, presentation, and poor overall prognosis even with treatment, family decided on transitioning to comfort care.    For this encounter, evaluated and examined patient.  Although he was unable to interact, spoke with his RN Dick about his case.  Also spoke with Mireya on the phone.  She ultimately wants him to be comfortable.  We reviewed his end-of-life preferences, and he did not have any strong desires of being out of a hospital setting for EOL.  Given that he could clinically deteriorate very quickly, there was concern about possible transport to a place like a hospice unit.    Review of Systems   Unable to perform ROS: Acuity of condition       Past Medical History:   Diagnosis Date    VAN (acute kidney injury) (HCC)     Atrial flutter (HCC)     CHF (congestive  heart failure) (HCC)     CKD (chronic kidney disease) stage 3, GFR 30-59 ml/min (HCC)     CKD (chronic kidney disease) stage 4, GFR 15-29 ml/min (HCC) 5/3/2021    Hypertension     Obstructed, uropathy     Sepsis (HCC)     Venous stasis     Venous stasis ulcer ([facility]) 6/14/2017     Past Surgical History:   Procedure Laterality Date    PROSTATE SURGERY      TONSILLECTOMY      TRANSURETHRAL RESECTION OF PROSTATE       Social History     Socioeconomic History    Marital status: /Civil Union     Spouse name: Not on file    Number of children: 0    Years of education: Not on file    Highest education level: Not on file   Occupational History    Not on file   Tobacco Use    Smoking status: Never    Smokeless tobacco: Never   Vaping Use    Vaping status: Never Used   Substance and Sexual Activity    Alcohol use: No    Drug use: No    Sexual activity: Not Currently   Other Topics Concern    Not on file   Social History Narrative    Travel outside [place]: No      Social Determinants of Health     Financial Resource Strain: Low Risk  (10/3/2023)    Overall Financial Resource Strain (CARDIA)     Difficulty of Paying Living Expenses: Not hard at all   Food Insecurity: No Food Insecurity (2/6/2024)    Hunger Vital Sign     Worried About Running Out of Food in the Last Year: Never true     Ran Out of Food in the Last Year: Never true   Transportation Needs: No Transportation Needs (2/6/2024)    PRAPARE - Transportation     Lack of Transportation (Medical): No     Lack of Transportation (Non-Medical): No   Physical Activity: Not on file   Stress: Not on file   Social Connections: Not on file   Intimate Partner Violence: Not on file   Housing Stability: Low Risk  (2/6/2024)    Housing Stability Vital Sign     Unable to Pay for Housing in the Last Year: No     Number of Places Lived in the Last Year: 1     Unstable Housing in the Last Year: No     History reviewed. No pertinent family history.    MEDICATIONS / ALLERGIES:    all  current active meds have been reviewed, current meds:   Current Facility-Administered Medications   Medication Dose Route Frequency    morphine injection 2 mg  2 mg Intravenous Q1H PRN   , and PTA meds:   Prior to Admission Medications   Prescriptions Last Dose Informant Patient Reported? Taking?   allopurinol (ZYLOPRIM) 100 mg tablet Past Week Self No Yes   Sig: Take 1 tablet (100 mg total) by mouth 2 (two) times a day   amLODIPine (NORVASC) 5 mg tablet Past Week Self No Yes   Sig: Take 1 tablet (5 mg total) by mouth daily   cholecalciferol (VITAMIN D3) 1,000 units tablet Past Week Self No Yes   Sig: Take 1 tablet (1,000 Units total) by mouth daily   ferrous sulfate 325 (65 Fe) mg tablet Past Week Self No Yes   Sig: Take 1 tablet (325 mg total) by mouth daily with breakfast   furosemide (LASIX) 40 mg tablet Past Week  No Yes   Sig: Take 1 tablet (40 mg total) by mouth daily   levothyroxine (Euthyrox) 112 mcg tablet Past Week  No Yes   Sig: Take 1 tablet (112 mcg total) by mouth daily in the early morning   metoprolol succinate (TOPROL-XL) 25 mg 24 hr tablet Past Week  No Yes   Sig: Take 1 tablet (25 mg total) by mouth daily   neomycin-bacitracin-polymyxin (NEOSPORIN) 5-400-5,000 ointment   No No   Sig: Apply topically 3 (three) times a day for 5 days   Patient not taking: Reported on 1/13/2021      Facility-Administered Medications: None       No Known Allergies    OBJECTIVE:    Physical Exam  Physical Exam  Constitutional:       General: He is not in acute distress.     Appearance: He is ill-appearing and toxic-appearing.   HENT:      Head: Normocephalic and atraumatic.      Right Ear: External ear normal.      Left Ear: External ear normal.      Nose: Nose normal.      Mouth/Throat:      Mouth: Mucous membranes are dry.      Pharynx: Oropharynx is clear.   Eyes:      Conjunctiva/sclera: Conjunctivae normal.   Cardiovascular:      Rate and Rhythm: Regular rhythm. Tachycardia present.      Pulses: Normal pulses.    Pulmonary:      Effort: Tachypnea present. No respiratory distress.   Abdominal:      General: There is no distension.      Palpations: Abdomen is soft.      Tenderness: There is no abdominal tenderness.   Genitourinary:     Pubic Area: Rash present.      Comments: Scrotum showing a lot of erythema and confluence; very tender to touch  Musculoskeletal:      Right lower leg: Edema present.      Left lower leg: Edema present.   Skin:     General: Skin is warm.      Coloration: Skin is pale. Skin is not jaundiced.   Neurological:      Comments: Opens eyes to voice, but otherwise not responding or able to converse         Lab Results: I have personally reviewed pertinent labs., CBC:   Lab Results   Component Value Date    WBC 6.07 02/06/2024    HGB 10.5 (L) 02/06/2024    HCT 32.6 (L) 02/06/2024     (H) 02/06/2024     (L) 02/06/2024    RBC 3.15 (L) 02/06/2024    MCH 33.3 02/06/2024    MCHC 32.2 02/06/2024    RDW 17.4 (H) 02/06/2024    MPV 11.1 02/06/2024    NRBC 1 02/06/2024   , CMP:   Lab Results   Component Value Date    SODIUM 141 02/06/2024    K 5.1 02/06/2024     02/06/2024    CO2 16 (L) 02/06/2024    BUN 94 (H) 02/06/2024    CREATININE 4.54 (H) 02/06/2024    CALCIUM 8.7 02/06/2024     (H) 02/06/2024    ALT 92 (H) 02/06/2024    ALKPHOS 37 02/06/2024    EGFR 10 02/06/2024   , BMP:  Lab Results   Component Value Date    SODIUM 141 02/06/2024    K 5.1 02/06/2024     02/06/2024    CO2 16 (L) 02/06/2024    BUN 94 (H) 02/06/2024    CREATININE 4.54 (H) 02/06/2024    GLUC 68 02/06/2024    CALCIUM 8.7 02/06/2024    AGAP 20 02/06/2024    EGFR 10 02/06/2024   , PT/PTT:  Lab Results   Component Value Date    PTT 28 02/06/2024     Imaging Studies:   CT BRAIN - WITHOUT CONTRAST     INDICATION: Syncopal episode, fall.     COMPARISON: CT head 03/05/2018     TECHNIQUE:  CT examination of the brain was performed.  Multiplanar 2D reformatted images were created from the source data.     Radiation  dose length product (DLP) for this visit:  984.34 mGy-cm .  This examination, like all CT scans performed in the [facility], was performed utilizing techniques to minimize radiation dose exposure, including the use of iterative   reconstruction and automated exposure control.     IMAGE QUALITY:  Diagnostic.     FINDINGS:     PARENCHYMA:        Mixed density left cerebellopontine angle lesion measuring approximately 1.8 x 2.0 x 1.8 cm (2/17 and 400/58) with associated widening of the left internal auditory canal. Hypoattenuation of the adjacent left anterior olu which may be secondary to   artifact or related to a cystic component associated with the lesion. In retrospect, the lesion is visualized on the prior examination of 3/5/2018, however, not clearly delineated due to volume averaging.     Decreased attenuation is noted in periventricular and subcortical white matter demonstrating an appearance that is statistically most likely to represent mild microangiopathic change. Atherosclerotic vascular calcifications of the bilateral intracranial   ICAs.  Small right inferior cerebellar chronic infarct.     No mass effect or midline shift. No acute parenchymal hemorrhage.     VENTRICLES AND EXTRA-AXIAL SPACES:  Ventricles and extra-axial CSF spaces are prominent commensurate with the degree of volume loss.  No hydrocephalus.  No acute extra-axial hemorrhage.     VISUALIZED ORBITS: Normal visualized orbits.     PARANASAL SINUSES: Right maxillary retention cyst versus polyp.     CALVARIUM AND EXTRACRANIAL SOFT TISSUES:  Normal.     IMPRESSION:     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 Hypoattenuation of the adjacent left anterior olu which may be secondary to artifact or related to a cystic component of the lesion.   Recommend follow-up contrast-enhanced MRI of the brain and IACs for further evaluation.     Chronic  microangiopathic changes.     The study was marked in EPIC for immediate notification.  ------------------  CT CERVICAL SPINE - WITHOUT CONTRAST     INDICATION: Syncopal episode, fall.     COMPARISON: CT cervical spine 03/05/2018     TECHNIQUE:  CT examination of the cervical spine was performed without intravenous contrast.  Contiguous axial images were obtained. Multiplanar 2D reformatted images were created from the source data.     Radiation dose length product (DLP) for this visit:  579.29 mGy-cm .  This examination, like all CT scans performed in the [facility], was performed utilizing techniques to minimize radiation dose exposure, including the use of iterative   reconstruction and automated exposure control.     IMAGE QUALITY:  Diagnostic.     FINDINGS:     ALIGNMENT: Straightening of the normal cervical spine lordosis. Stable minimal C4 on C5 anterolisthesis.     VERTEBRAE:  No fracture. Postsurgical changes of C2-C4 fusion.     DEGENERATIVE CHANGES:  Severe multilevel cervical degenerative changes are noted. No critical central canal stenosis.     PREVERTEBRAL AND PARASPINAL SOFT TISSUES: Atherosclerotic vascular calcifications of the aortic arch and the carotid bifurcations bilaterally.     THORACIC INLET: Partially visualized ectatic thoracic aorta measuring up to 4.1 cm diameter.     IMPRESSION:     No cervical spine fracture or traumatic malalignment.  -------  CT CHEST, ABDOMEN AND PELVIS WITHOUT IV CONTRAST     INDICATION: groin infection concern.     COMPARISON: 6/13/2017     TECHNIQUE: CT examination of the chest, abdomen and pelvis was performed without intravenous contrast. Multiplanar 2D reformatted images were created from the source data.     This examination, like all CT scans performed in the [facility], was performed utilizing techniques to minimize radiation dose exposure, including the use of iterative reconstruction and automated exposure control.  Radiation dose length   product (DLP) for this visit: 1301.07 mGy-cm     Enteric Contrast: Not administered.     FINDINGS:     CHEST     LUNGS: Respiratory motion artifact. Hypoventilatory changes bibasally. Patent tracheobronchial tree.     PLEURA: Unremarkable.     HEART/GREAT VESSELS: Mild to moderate cardiomegaly. Coronary artery calcifications. No thoracic aortic aneurysm.     MEDIASTINUM AND DEE: Unremarkable.     CHEST WALL AND LOWER NECK: Unremarkable.     ABDOMEN     LIVER/BILIARY TREE: Unremarkable.     GALLBLADDER: Cholelithiasis without findings of acute cholecystitis.     SPLEEN: Unremarkable.     PANCREAS: Unremarkable.     ADRENAL GLANDS: Unremarkable.     KIDNEYS/URETERS: Severe atrophy of the right kidney. Otherwise unremarkable. No hydroureteronephrosis or radiopaque stones.     STOMACH AND BOWEL: Unremarkable.     APPENDIX: No findings to suggest appendicitis.     ABDOMINOPELVIC CAVITY: No ascites. No pneumoperitoneum. No lymphadenopathy.     VESSELS: Unremarkable for patient's age.     PELVIS     REPRODUCTIVE ORGANS: Unremarkable for patient's age.     URINARY BLADDER: Unremarkable.     ABDOMINAL WALL/INGUINAL REGIONS: Scrotal wall edema. No gas. No fluid collection.     BONES: No acute fracture or suspicious osseous lesion. No radiographic osteomyelitis.     IMPRESSION:  Scrotal wall edema. The scrotum is incompletely imaged.  No soft tissue gas or drainable fluid collection.  No acute intra-abdominal pathology.  No radiographic findings of osteomyelitis.  Incidental findings, as per the body of the report.  EKG, Pathology, and Other Studies: Microbiology blood cultures showing gram-positive cocci in chains from 2/6/2024    ECG from 2/6/2024 revealed sinus tachycardia with RBBB and left anterior fascicular block; QTc 536    Counseling / Coordination of Care    Total floor / unit time spent today 60 minutes. Greater than 50% of total time was spent with the patient and / or family counseling and  / or coordination of care. A description of the counseling / coordination of care: Chart review, patient interview/discussion, psychosocial support, comprehensive symptom evaluation, physical examination, medication evaluation/alteration, advance care/goals-of-care planning, and multidisciplinary collaboration.

## 2024-02-07 NOTE — PROGRESS NOTES
INTERNAL MEDICINE RESIDENCY PROGRESS NOTE     Name: Haris Lau   Age & Sex: 89 y.o. male   MRN: [id number]  Unit/Bed#: NW8 865-01   Encounter: [id number]  Team: SOD Team B     PATIENT INFORMATION     Name: Haris Lau   Age & Sex: 89 y.o. male   MRN: [id number]  Hospital Stay Days: 1    ASSESSMENT/PLAN     Problem list:  1) Septic shock complicated by multiorgan dysfunction  2) Chronic diastolic congestive heart failure  3) VAN  4) Essential HTN    * Septic shock (HCC)  Assessment & Plan  - Patient presenting with HR > 90, MAP ~70's, lactate 4.7 to 3.5 (after 2L fluids).  -Evidence of multiorgan dysfunction with elevated troponin, elevated creatinine from baseline.  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   - Decision made to not pursue aggressive interventions after extensive discussion with family/POA between surgical team, critical care team and primary medicine service. Decision was made to pursue comfort care after persistent hypotension responsive only to aggressive fluid supplementation which would compromise the patient's respiratory status given his history of CHF.   - Family is in agreement to pursue comfort care    Plan:  - IVF discontinued s/p multiple liters and drip  - Vancomycin/Zosyn given for abx. Now withdrawn as patient is on comfort care.  - Med surg level for comfort care  - Palliative care consult for goals of care discussions with family  - Home vs inpatient hospice     Chronic diastolic congestive heart failure (HCC)  Assessment & Plan  Wt Readings from Last 3 Encounters:   01/30/24 96.6 kg (213 lb)   10/03/23 95.3 kg (210 lb)   07/11/23 96.8 kg (213 lb 6.4 oz)     -Patient has history of HFpEF, on Lasix 40 mg daily at home.  Additionally on Toprol-XL 25 mg daily.    Plan:  -Patient on comfort care, withdrawing further hemodynamic monitoring and management    Acute  kidney injury (HCC)  Assessment & Plan  Patient presenting with acute kidney injury superimposed on chronic kidney disease; evidenced by increase in serum creatinine > 1.5 times baseline within the last 7 days with baseline creatinine ~2.  Suspect etiology is prerenal with hypoperfusion due to hypovolemia and low effective circulating volume due to septic shock.    Plan:  - Patient level 4 comfort care, withdrawing further work up and management of VAN    Essential hypertension  Assessment & Plan  Patient with history of hypertension on amlodipine 5 mg daily, Toprol-XL 25 mg daily at home.    Plan:  -Will hold home antihypertensive and beta-blocker given current septic shock with hypotension  -Holding further management as patient is on comfort care      Elevated troponin  Assessment & Plan  Patient noted to have troponin in the ED.  Suspect this is likely due to demand ischemia in the setting of septic shock, with hypoperfusion.  Troponin trend: 189-165-179    On my read, twelve-lead appears to be A-fib with rate of approximately 100 to 110 bpm, nonspecific ST-T wave changes.      Acquired hypothyroidism  Assessment & Plan  Patient on levothyroxine at home, unclear if he has been adherent to this.    - Will not continue at this time as patient is altered, critically ill        Disposition: Home vs inpatient hospice. Palliative medicine is on board for continued goals of care discussions with family.      SUBJECTIVE     Patient seen and examined. Overnight, patient was noted to have hypotension to 74/32 responsive only to aggressive fluid supplementation. Given the patient's history of CHF and critical illness, the decision was made with the patient's POA to go to comfort care. This morning, the patient is somnolent, but arises to noxious stimulus and states that he has no complaints.     OBJECTIVE     Vitals:    02/06/24 2035 02/06/24 2106 02/06/24 2315 02/07/24 0756   BP: 110/62 (!) 89/53     BP Location: Left arm       Pulse: 100 102 100 (!) 114   Resp: 22 20 20 (!) 26   Temp:       SpO2: 97% 96%  93%      Temperature:   No data recorded.    Temperature: 97.5 °F (36.4 °C)  Intake & Output:  I/O         02/05 0701  02/06 0700 02/06 0701  02/07 0700 02/07 0701  02/08 0700    I.V.  1000     IV Piggyback  1150     Total Intake  2150     Net  +2150                  Weights:        There is no height or weight on file to calculate BMI.  Weight (last 2 days)       None          Physical Exam  Constitutional:       Appearance: He is ill-appearing.   HENT:      Mouth/Throat:      Mouth: Mucous membranes are moist.      Pharynx: Oropharynx is clear.   Eyes:      Extraocular Movements: Extraocular movements intact.   Cardiovascular:      Rate and Rhythm: Regular rhythm. Tachycardia present.      Pulses: Normal pulses.      Heart sounds: Normal heart sounds.   Pulmonary:      Breath sounds: Normal breath sounds.      Comments:   -Increased work of breathing, accessory muscle use  Abdominal:      General: Abdomen is flat. Bowel sounds are normal. There is no distension.      Palpations: Abdomen is soft.      Tenderness: There is no abdominal tenderness.   Genitourinary:     Comments:   -Scrotal swelling, erythema, mucoid discharge around head of penis  Musculoskeletal:         General: Tenderness present.      Cervical back: Normal range of motion.      Right lower leg: Edema present.      Left lower leg: Edema present.      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      Capillary Refill: Capillary refill takes 2 to 3 seconds.      Comments:   -Erythema, swelling in groin around genitals  -No crepitus    Neurological:      Comments:   -Somnolent  -Oriented to self, minimally verbal       LABORATORY DATA     Labs: I have personally reviewed pertinent reports.  Results from last 7 days   Lab Units 02/06/24  1134   WBC Thousand/uL 6.07   HEMOGLOBIN g/dL 10.5*    HEMATOCRIT % 32.6*   PLATELETS Thousands/uL 139*   MONO PCT % 12   EOS PCT % 0      Results from last 7 days   Lab Units 02/06/24  1134   POTASSIUM mmol/L 5.1   CHLORIDE mmol/L 105   CO2 mmol/L 16*   BUN mg/dL 94*   CREATININE mg/dL 4.54*   CALCIUM mg/dL 8.7   ALK PHOS U/L 37   ALT U/L 92*   AST U/L 293*              Results from last 7 days   Lab Units 02/06/24  1150   INR  1.40*   PTT seconds 28     Results from last 7 days   Lab Units 02/06/24  1450   LACTIC ACID mmol/L 3.5*           IMAGING & DIAGNOSTIC TESTING     Radiology Results: I have personally reviewed pertinent reports.  CT chest abdomen pelvis wo contrast    Result Date: 2/6/2024  Impression: Scrotal wall edema. The scrotum is incompletely imaged. No soft tissue gas or drainable fluid collection. No acute intra-abdominal pathology. No radiographic findings of osteomyelitis. Incidental findings, as per the body of the report. 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 Hypoattenuation of the adjacent left anterior olu which may be secondary to artifact or related to a cystic component of the lesion. Recommend follow-up contrast-enhanced MRI of the brain and IACs for further evaluation. Chronic microangiopathic changes. The study was marked in EPIC for immediate notification. Resident: KIRIT ABBOTT I, the attending radiologist, have reviewed the images and agree with the final report above. Workstation performed: [id number]     CT spine cervical without contrast    Result Date: 2/6/2024  Impression: No cervical spine fracture or traumatic malalignment. Resident: KIRIT ABBOTT I, the attending radiologist, have reviewed the images and agree with the final report above. Workstation performed: [id number]     Other Diagnostic Testing: I have personally reviewed pertinent  "reports.    ACTIVE MEDICATIONS     Current Facility-Administered Medications   Medication Dose Route Frequency    calamine-zinc oxide lotion   Topical 4x Daily    HYDROmorphone (DILAUDID) injection 0.5 mg  0.5 mg Intravenous Q1H PRN    HYDROmorphone (DILAUDID) injection 1 mg  1 mg Intravenous Q1H PRN    LORazepam (ATIVAN) injection 0.5 mg  0.5 mg Intravenous Q1H PRN    LORazepam (ATIVAN) injection 1 mg  1 mg Intravenous Q1H PRN    nystatin (MYCOSTATIN) powder   Topical BID       VTE Pharmacologic Prophylaxis: Reason for no pharmacologic prophylaxis :comfort care  VTE Mechanical Prophylaxis: reason for no mechanical VTE prophylaxis comfort care    Portions of the record may have been created with voice recognition software.  Occasional wrong word or \"sound a like\" substitutions may have occurred due to the inherent limitations of voice recognition software.  Read the chart carefully and recognize, using context, where substitutions have occurred.  ==  Robert Berry  [facility]  MS4       "

## 2024-02-07 NOTE — ASSESSMENT & PLAN NOTE
Comfort based cares only  Spoke with jett Gomes on the phone, who ultimately wants him to be as comfortable as possible  Given high possibility of rapid deterioration in the setting of septic shock, will hold inpatient with comfort cares until tomorrow morning and reevaluate for possibility of hospice evaluation and transfer to IPU     Code Status: Comfort - Level 4   Decisional apparatus:  Patient is not competent on my exam today.  If competence is lost, patient's substitute decision maker would default to Jett Gomes by [place].   Advance Directive / Living Will / POLST:  None on file

## 2024-02-07 NOTE — QUICK NOTE
Responded to nursing call around 1830 this evening.  Pressures remain below 90/60 despite being given 4 L total bolus of NS.  Was on 100 cc/h at this time.  When I saw patient he was perfusing, skin was warm and pulses were 1-2+ bilaterally on upper extremity yet 1+ bilaterally in lower extremity.  He was occasionally responsive to sternal rub/stimulation.  An S4 was heard on auscultation of heart and lungs had early to mid expiratory crackles.    Attempted to put patient in Trendelenburg position in order to see if pressures would rise while on maintenance fluids.  However pressures only stayed stable in the 80s systolic while in Trendelenburg position.  Pressures dropped to 75 systolic when level to ground.    At this time I discussed with family that I believe the only thing keeping him stable hemodynamically where the fluid boluses.  However if I were to continue giving him fluid boluses this would volume overload him to the point of discomfort/potential pulmonary asphyxiation.    Per previous notes by surgery, critical care, and [place] admitting team and the family was interested in comfort care earlier and had declined critical care support with pressors or mechanical ventilation, etc.  Given my assessment they had decided they would choose comfort care at this time.    He has been taken off maintenance fluids and will not be given any further IVF boluses.  All other IV medications were stopped at this time.  Should patient's pressures continue to drop, or other vital signs sees no further actions will be taken.    Patient's POA was present in patient's room during this decision-making process.    Will continue to monitor patient while off fluids.    Morphine is on as needed for dyspnea, however I did explain to family that this would likely take his blood pressures.  Therefore it is only to be used if patient is in significant pain or with agonal breathing.  Please reach out to SOD physician prior to giving any  pain medication.    - - - - - - - - - - -- - - - - - - - - - -- - - - - - - - - - -    Chilo West MD  Internal Medicine Resident, PGY-1  [facility]  Service: SOD-A   Available via [employer]  [email or web address]

## 2024-02-07 NOTE — PLAN OF CARE
Problem: Prexisting or High Potential for Compromised Skin Integrity  Goal: Skin integrity is maintained or improved  Description: INTERVENTIONS:  - Identify patients at risk for skin breakdown  - Assess and monitor skin integrity  - Assess and monitor nutrition and hydration status  - Monitor labs   - Assess for incontinence   - Turn and reposition patient  - Assist with mobility/ambulation  - Relieve pressure over bony prominences  - Avoid friction and shearing  - Provide appropriate hygiene as needed including keeping skin clean and dry  - Evaluate need for skin moisturizer/barrier cream  - Collaborate with interdisciplinary team   - Patient/family teaching  - Consider wound care consult   2/7/2024 0800 by Dick Milner RN  Outcome: Progressing  2/7/2024 0800 by Dick Milner RN  Outcome: Progressing

## 2024-02-08 NOTE — DISCHARGE SUMMARY
Discharge Summary - Haris Lau 89 y.o. male MRN: [id number]    Unit/Bed#: NW8 872-02 Encounter: [id number] PCP: Sin Moreno MD    Admission Date:   Admission Orders (From admission, onward)       Ordered        24 1512  INPATIENT ADMISSION  Once            24 1521  Inpatient Admission  Once                            Admitting Diagnosis: Cellulitis [L03.90]  Syncope [R55]  SIRS (systemic inflammatory response syndrome) (HCC) [R65.10]  Septic shock (HCC) [A41.9, R65.21]  Cellulitis of other specified site [L03.818]    HPI: Patient is a 89 y/o M w pmhx of CHF on lasix/toprol, CKD 3b hypothyroidism on levothyroxine who presented to the [facility]"" ED with his wife after a possible syncopal episode and scrotal redness/swelling.  In the ED, patient noted to be dry with discharge from his penis and an edematous/erythematous scrotum with concern for Christophe's. Workup was node for truama and sepsis.     Procedures Performed:   Orders Placed This Encounter   Procedures    Critical Care    ED ECG Documentation Only     Summary of Hospital Course: Patient was resuscitated with 3 L IVF, on 100 ml/hr isolyte, and initially started on linezolid, cefepime, metronidazole. Given age, care goals, level 3 DNR/DNI, no surgical debridement, or intubation. Blood cx pos for gram pos in chains. ID consulted. Patient admitted for abx, fluids. Patient developed hypotension refractory to fluids. After discussion with family, decision made to stop IVF and transition patient to Level 4 - Comfort Care.     Significant Findings, Care, Treatment and Services Provided: as above    Complications: as above    Disposition:      Final Diagnosis:  with cause of death septic shock    Medical Problems       Resolved Problems  Date Reviewed: 2024   None       Condition at Time of Death:  as per examination findings below.     Date, Time and Cause of Death    Date of Death: 24  Time of Death: 11:17 PM  Preliminary  Cause of Death: Septic shock (HCC)  Entered by: Morelia Oconnell DO[MP1.1]       Attribution       MP1.1 Morelia Oconnell DO 02/07/24 23:29            Death Note:    INPATIENT DEATH NOTE  Haris Lau 89 y.o. male MRN: [id number]  Unit/Bed#: NW8 872-02 Encounter: [id number]             PHYSICAL EXAM:  Unresponsive to noxious stimuli, Spontaneous respirations absent, Breath sounds absent, Carotid pulse absent, Heart sounds absent, Pupillary light reflex absent, and Corneal blink reflex absent.     Medical Examiner notification criteria:  NONE APPLICABLE   Medical Examiner's office notified?:  No, does not meet ME notification criteria   Medical Examiner accepted case?:  No  Name of Medical Examiner: N/A         Autopsy Options:  Post-mortem examination declined by next of kin    Primary Service Attending Physician notified?:  yes - Attending:  Joseluis Tapia MD/ Dr. Moreno    Physician/Resident responsible for completing Discharge Summary:  Morelia Oconnell DO

## 2024-02-08 NOTE — DEATH NOTE
INPATIENT DEATH NOTE  Haris Lau 89 y.o. male MRN: [id number]  Unit/Bed#: NW8 872-02 Encounter: [id number]             PHYSICAL EXAM:  Unresponsive to noxious stimuli, Spontaneous respirations absent, Breath sounds absent, Carotid pulse absent, Heart sounds absent, Pupillary light reflex absent, and Corneal blink reflex absent.     Medical Examiner notification criteria:  NONE APPLICABLE   Medical Examiner's office notified?:  No, does not meet ME notification criteria   Medical Examiner accepted case?:  No  Name of Medical Examiner: N/A         Autopsy Options:  Post-mortem examination declined by next of kin    Primary Service Attending Physician notified?:  yes - Attending:  Joseluis Tapia MD/ Dr. Moreno    Physician/Resident responsible for completing Discharge Summary:  Morelia Oconnell, DO

## 2024-02-09 LAB
ATRIAL RATE: 106 BPM
BACTERIA BLD CULT: ABNORMAL
BACTERIA BLD CULT: ABNORMAL
GRAM STN SPEC: ABNORMAL
GRAM STN SPEC: ABNORMAL
P AXIS: 0 DEGREES
PR INTERVAL: 136 MS
QRS AXIS: -69 DEGREES
QRSD INTERVAL: 160 MS
QT INTERVAL: 404 MS
QTC INTERVAL: 536 MS
STREPTOCOCCUS DNA BLD POS NAA+NON-PROBE: DETECTED
T WAVE AXIS: 44 DEGREES
VENTRICULAR RATE: 106 BPM